# Patient Record
Sex: FEMALE | Race: WHITE | NOT HISPANIC OR LATINO | Employment: STUDENT | URBAN - METROPOLITAN AREA
[De-identification: names, ages, dates, MRNs, and addresses within clinical notes are randomized per-mention and may not be internally consistent; named-entity substitution may affect disease eponyms.]

---

## 2021-03-09 ENCOUNTER — OFFICE VISIT (OUTPATIENT)
Dept: OBSTETRICS AND GYNECOLOGY | Facility: CLINIC | Age: 21
End: 2021-03-09
Payer: MEDICAID

## 2021-03-09 VITALS
SYSTOLIC BLOOD PRESSURE: 130 MMHG | HEIGHT: 65 IN | WEIGHT: 256.38 LBS | BODY MASS INDEX: 42.71 KG/M2 | DIASTOLIC BLOOD PRESSURE: 76 MMHG

## 2021-03-09 DIAGNOSIS — N92.6 MENSES, IRREGULAR: ICD-10-CM

## 2021-03-09 DIAGNOSIS — Z01.419 WELL WOMAN EXAM WITH ROUTINE GYNECOLOGICAL EXAM: Primary | ICD-10-CM

## 2021-03-09 DIAGNOSIS — Z30.44 ENCOUNTER FOR SURVEILLANCE OF VAGINAL RING HORMONAL CONTRACEPTIVE DEVICE: ICD-10-CM

## 2021-03-09 PROCEDURE — 88175 CYTOPATH C/V AUTO FLUID REDO: CPT | Performed by: OBSTETRICS & GYNECOLOGY

## 2021-03-09 PROCEDURE — 99999 PR PBB SHADOW E&M-NEW PATIENT-LVL III: CPT | Mod: PBBFAC,,, | Performed by: OBSTETRICS & GYNECOLOGY

## 2021-03-09 PROCEDURE — 99385 PREV VISIT NEW AGE 18-39: CPT | Mod: S$PBB,,, | Performed by: OBSTETRICS & GYNECOLOGY

## 2021-03-09 PROCEDURE — 99999 PR PBB SHADOW E&M-NEW PATIENT-LVL III: ICD-10-PCS | Mod: PBBFAC,,, | Performed by: OBSTETRICS & GYNECOLOGY

## 2021-03-09 PROCEDURE — 99385 PR PREVENTIVE VISIT,NEW,18-39: ICD-10-PCS | Mod: S$PBB,,, | Performed by: OBSTETRICS & GYNECOLOGY

## 2021-03-09 PROCEDURE — 99203 OFFICE O/P NEW LOW 30 MIN: CPT | Mod: PBBFAC,PO | Performed by: OBSTETRICS & GYNECOLOGY

## 2021-03-09 RX ORDER — NORELGESTROMIN AND ETHINYL ESTRADIOL 150; 35 UG/D; UG/D
1 PATCH TRANSDERMAL
COMMUNITY
Start: 2021-02-22 | End: 2021-03-09

## 2021-03-09 RX ORDER — PROPRANOLOL HYDROCHLORIDE 80 MG/1
80 CAPSULE, EXTENDED RELEASE ORAL DAILY
COMMUNITY
Start: 2021-02-18 | End: 2024-01-10 | Stop reason: CLARIF

## 2021-03-09 RX ORDER — ETONOGESTREL AND ETHINYL ESTRADIOL VAGINAL RING .015; .12 MG/D; MG/D
1 RING VAGINAL
Qty: 3 EACH | Refills: 4 | Status: SHIPPED | OUTPATIENT
Start: 2021-03-09 | End: 2023-01-18

## 2021-03-09 RX ORDER — SUMATRIPTAN SUCCINATE 100 MG/1
100 TABLET ORAL DAILY PRN
COMMUNITY
Start: 2021-02-18 | End: 2023-01-18

## 2021-03-11 LAB
C TRACH RRNA SPEC QL NAA+PROBE: NEGATIVE
N GONORRHOEA RRNA SPEC QL NAA+PROBE: NEGATIVE

## 2021-03-17 LAB
CLINICAL INFO: NORMAL
CYTO CVX: NORMAL
CYTOLOGIST CVX/VAG CYTO: NORMAL
CYTOLOGY CMNT CVX/VAG CYTO-IMP: NORMAL
CYTOLOGY PAP THIN PREP EXPLANATION: NORMAL
DATE OF PREVIOUS PAP: NORMAL
DATE PREVIOUS BX: NO
LMP START DATE: NORMAL
SPECIMEN SOURCE CVX/VAG CYTO: NORMAL
STAT OF ADQ CVX/VAG CYTO-IMP: NORMAL

## 2021-04-16 ENCOUNTER — PATIENT MESSAGE (OUTPATIENT)
Dept: RESEARCH | Facility: HOSPITAL | Age: 21
End: 2021-04-16

## 2021-06-02 ENCOUNTER — TELEPHONE (OUTPATIENT)
Dept: OBSTETRICS AND GYNECOLOGY | Facility: CLINIC | Age: 21
End: 2021-06-02

## 2021-06-10 ENCOUNTER — OFFICE VISIT (OUTPATIENT)
Dept: OBSTETRICS AND GYNECOLOGY | Facility: CLINIC | Age: 21
End: 2021-06-10
Payer: MEDICAID

## 2021-06-10 VITALS
WEIGHT: 263.38 LBS | HEIGHT: 65 IN | DIASTOLIC BLOOD PRESSURE: 82 MMHG | SYSTOLIC BLOOD PRESSURE: 128 MMHG | BODY MASS INDEX: 43.88 KG/M2

## 2021-06-10 DIAGNOSIS — Z30.44 ENCOUNTER FOR SURVEILLANCE OF VAGINAL RING HORMONAL CONTRACEPTIVE DEVICE: ICD-10-CM

## 2021-06-10 DIAGNOSIS — N92.6 MENSES, IRREGULAR: Primary | ICD-10-CM

## 2021-06-10 DIAGNOSIS — R63.5 WEIGHT GAIN: ICD-10-CM

## 2021-06-10 PROCEDURE — 99999 PR PBB SHADOW E&M-EST. PATIENT-LVL III: ICD-10-PCS | Mod: PBBFAC,,, | Performed by: OBSTETRICS & GYNECOLOGY

## 2021-06-10 PROCEDURE — 99213 PR OFFICE/OUTPT VISIT, EST, LEVL III, 20-29 MIN: ICD-10-PCS | Mod: S$PBB,,, | Performed by: OBSTETRICS & GYNECOLOGY

## 2021-06-10 PROCEDURE — 99213 OFFICE O/P EST LOW 20 MIN: CPT | Mod: PBBFAC,PO | Performed by: OBSTETRICS & GYNECOLOGY

## 2021-06-10 PROCEDURE — 99999 PR PBB SHADOW E&M-EST. PATIENT-LVL III: CPT | Mod: PBBFAC,,, | Performed by: OBSTETRICS & GYNECOLOGY

## 2021-06-10 PROCEDURE — 99213 OFFICE O/P EST LOW 20 MIN: CPT | Mod: S$PBB,,, | Performed by: OBSTETRICS & GYNECOLOGY

## 2021-10-22 ENCOUNTER — OFFICE VISIT (OUTPATIENT)
Dept: URGENT CARE | Facility: CLINIC | Age: 21
End: 2021-10-22
Payer: MEDICAID

## 2021-10-22 VITALS
BODY MASS INDEX: 43.32 KG/M2 | HEART RATE: 85 BPM | RESPIRATION RATE: 18 BRPM | SYSTOLIC BLOOD PRESSURE: 122 MMHG | HEIGHT: 65 IN | TEMPERATURE: 97 F | OXYGEN SATURATION: 98 % | WEIGHT: 260 LBS | DIASTOLIC BLOOD PRESSURE: 79 MMHG

## 2021-10-22 DIAGNOSIS — M76.30 ILIOTIBIAL BAND SYNDROME, UNSPECIFIED LATERALITY: Primary | ICD-10-CM

## 2021-10-22 PROCEDURE — 99204 PR OFFICE/OUTPT VISIT, NEW, LEVL IV, 45-59 MIN: ICD-10-PCS | Mod: S$GLB,,, | Performed by: PHYSICIAN ASSISTANT

## 2021-10-22 PROCEDURE — 99204 OFFICE O/P NEW MOD 45 MIN: CPT | Mod: S$GLB,,, | Performed by: PHYSICIAN ASSISTANT

## 2021-10-22 RX ORDER — AMITRIPTYLINE HYDROCHLORIDE 10 MG/1
TABLET, FILM COATED ORAL
COMMUNITY
Start: 2021-05-21 | End: 2021-12-06

## 2021-10-22 RX ORDER — NAPROXEN 500 MG/1
500 TABLET ORAL 2 TIMES DAILY
Qty: 60 TABLET | Refills: 0 | Status: SHIPPED | OUTPATIENT
Start: 2021-10-22 | End: 2021-11-21

## 2021-10-22 RX ORDER — BUTALBITAL, ACETAMINOPHEN AND CAFFEINE 300; 40; 50 MG/1; MG/1; MG/1
1-2 CAPSULE ORAL EVERY 4 HOURS PRN
COMMUNITY
Start: 2021-04-27 | End: 2021-12-06

## 2021-11-26 ENCOUNTER — HOSPITAL ENCOUNTER (EMERGENCY)
Facility: HOSPITAL | Age: 21
Discharge: HOME | End: 2021-11-26
Attending: EMERGENCY MEDICINE
Payer: MEDICAID

## 2021-11-26 VITALS
OXYGEN SATURATION: 99 % | RESPIRATION RATE: 18 BRPM | BODY MASS INDEX: 43.27 KG/M2 | SYSTOLIC BLOOD PRESSURE: 122 MMHG | WEIGHT: 260 LBS | TEMPERATURE: 99 F | DIASTOLIC BLOOD PRESSURE: 72 MMHG | HEART RATE: 88 BPM

## 2021-11-26 DIAGNOSIS — M25.562 LEFT KNEE PAIN: ICD-10-CM

## 2021-11-26 LAB
B-HCG UR QL: NEGATIVE
CTP QC/QA: YES

## 2021-11-26 PROCEDURE — 96372 THER/PROPH/DIAG INJ SC/IM: CPT

## 2021-11-26 PROCEDURE — 81025 URINE PREGNANCY TEST: CPT | Performed by: PHYSICIAN ASSISTANT

## 2021-11-26 PROCEDURE — 99284 EMERGENCY DEPT VISIT MOD MDM: CPT | Mod: 25

## 2021-11-26 PROCEDURE — 63600175 PHARM REV CODE 636 W HCPCS: Performed by: PHYSICIAN ASSISTANT

## 2021-11-26 RX ORDER — KETOROLAC TROMETHAMINE 10 MG/1
10 TABLET, FILM COATED ORAL EVERY 6 HOURS
Qty: 20 TABLET | Refills: 0 | Status: SHIPPED | OUTPATIENT
Start: 2021-11-26 | End: 2021-12-01

## 2021-11-26 RX ORDER — KETOROLAC TROMETHAMINE 30 MG/ML
15 INJECTION, SOLUTION INTRAMUSCULAR; INTRAVENOUS
Status: COMPLETED | OUTPATIENT
Start: 2021-11-26 | End: 2021-11-26

## 2021-11-26 RX ADMIN — KETOROLAC TROMETHAMINE 15 MG: 30 INJECTION, SOLUTION INTRAMUSCULAR; INTRAVENOUS at 10:11

## 2021-12-06 ENCOUNTER — OFFICE VISIT (OUTPATIENT)
Dept: URGENT CARE | Facility: CLINIC | Age: 21
End: 2021-12-06
Payer: MEDICAID

## 2021-12-06 VITALS
WEIGHT: 260 LBS | HEART RATE: 80 BPM | SYSTOLIC BLOOD PRESSURE: 126 MMHG | RESPIRATION RATE: 16 BRPM | OXYGEN SATURATION: 99 % | DIASTOLIC BLOOD PRESSURE: 88 MMHG | HEIGHT: 65 IN | BODY MASS INDEX: 43.32 KG/M2 | TEMPERATURE: 99 F

## 2021-12-06 DIAGNOSIS — H60.331 ACUTE SWIMMER'S EAR OF RIGHT SIDE: Primary | ICD-10-CM

## 2021-12-06 PROBLEM — G43.909 MIGRAINE HEADACHE: Status: ACTIVE | Noted: 2021-12-06

## 2021-12-06 PROBLEM — F90.9 ATTENTION DEFICIT HYPERACTIVITY DISORDER (ADHD): Status: ACTIVE | Noted: 2021-12-06

## 2021-12-06 PROBLEM — F41.1 GENERALIZED ANXIETY DISORDER: Status: ACTIVE | Noted: 2021-12-06

## 2021-12-06 PROBLEM — R00.0 TACHYCARDIA: Status: ACTIVE | Noted: 2021-12-06

## 2021-12-06 PROCEDURE — 99213 OFFICE O/P EST LOW 20 MIN: CPT | Mod: S$GLB,,, | Performed by: PHYSICIAN ASSISTANT

## 2021-12-06 PROCEDURE — 99213 PR OFFICE/OUTPT VISIT, EST, LEVL III, 20-29 MIN: ICD-10-PCS | Mod: S$GLB,,, | Performed by: PHYSICIAN ASSISTANT

## 2021-12-06 RX ORDER — PREDNISONE 20 MG/1
20 TABLET ORAL DAILY
COMMUNITY
Start: 2021-09-24 | End: 2023-01-18

## 2021-12-06 RX ORDER — CETIRIZINE HYDROCHLORIDE 10 MG/1
10 TABLET ORAL DAILY
COMMUNITY
Start: 2021-09-15 | End: 2023-01-18

## 2021-12-06 RX ORDER — CEPHALEXIN 500 MG/1
CAPSULE ORAL
COMMUNITY
Start: 2021-12-02 | End: 2023-01-18

## 2021-12-06 RX ORDER — ACETAMINOPHEN AND CODEINE PHOSPHATE 120; 12 MG/5ML; MG/5ML
1 SOLUTION ORAL DAILY
COMMUNITY
Start: 2021-11-04 | End: 2023-01-18

## 2021-12-06 RX ORDER — FLUCONAZOLE 150 MG/1
TABLET ORAL
COMMUNITY
Start: 2021-09-24 | End: 2022-11-21

## 2021-12-06 RX ORDER — LIDOCAINE HYDROCHLORIDE 20 MG/ML
SOLUTION ORAL; TOPICAL
COMMUNITY
Start: 2021-09-02 | End: 2023-01-18

## 2021-12-06 RX ORDER — FLUTICASONE PROPIONATE 50 MCG
SPRAY, SUSPENSION (ML) NASAL
COMMUNITY
Start: 2021-09-15 | End: 2023-01-18

## 2021-12-06 RX ORDER — CIPROFLOXACIN AND DEXAMETHASONE 3; 1 MG/ML; MG/ML
4 SUSPENSION/ DROPS AURICULAR (OTIC) 2 TIMES DAILY
Qty: 7.5 ML | Refills: 0 | Status: SHIPPED | OUTPATIENT
Start: 2021-12-06 | End: 2021-12-13

## 2022-04-29 ENCOUNTER — OFFICE VISIT (OUTPATIENT)
Dept: OBSTETRICS AND GYNECOLOGY | Facility: CLINIC | Age: 22
End: 2022-04-29
Payer: MEDICAID

## 2022-04-29 ENCOUNTER — LAB VISIT (OUTPATIENT)
Dept: LAB | Facility: HOSPITAL | Age: 22
End: 2022-04-29
Attending: OBSTETRICS & GYNECOLOGY
Payer: MEDICAID

## 2022-04-29 VITALS
BODY MASS INDEX: 44.11 KG/M2 | SYSTOLIC BLOOD PRESSURE: 129 MMHG | WEIGHT: 264.75 LBS | HEIGHT: 65 IN | DIASTOLIC BLOOD PRESSURE: 85 MMHG

## 2022-04-29 DIAGNOSIS — N91.1 SECONDARY AMENORRHEA: ICD-10-CM

## 2022-04-29 DIAGNOSIS — Z12.4 CERVICAL CANCER SCREENING: ICD-10-CM

## 2022-04-29 DIAGNOSIS — N91.1 SECONDARY AMENORRHEA: Primary | ICD-10-CM

## 2022-04-29 LAB
HCG INTACT+B SERPL-ACNC: <1.2 MIU/ML
TSH SERPL DL<=0.005 MIU/L-ACNC: 1.01 UIU/ML (ref 0.4–4)

## 2022-04-29 PROCEDURE — 99213 PR OFFICE/OUTPT VISIT, EST, LEVL III, 20-29 MIN: ICD-10-PCS | Mod: S$PBB,,, | Performed by: OBSTETRICS & GYNECOLOGY

## 2022-04-29 PROCEDURE — 84443 ASSAY THYROID STIM HORMONE: CPT | Performed by: OBSTETRICS & GYNECOLOGY

## 2022-04-29 PROCEDURE — 88175 CYTOPATH C/V AUTO FLUID REDO: CPT | Performed by: OBSTETRICS & GYNECOLOGY

## 2022-04-29 PROCEDURE — 3074F SYST BP LT 130 MM HG: CPT | Mod: CPTII,,, | Performed by: OBSTETRICS & GYNECOLOGY

## 2022-04-29 PROCEDURE — 3008F BODY MASS INDEX DOCD: CPT | Mod: CPTII,,, | Performed by: OBSTETRICS & GYNECOLOGY

## 2022-04-29 PROCEDURE — 1159F MED LIST DOCD IN RCRD: CPT | Mod: CPTII,,, | Performed by: OBSTETRICS & GYNECOLOGY

## 2022-04-29 PROCEDURE — 3074F PR MOST RECENT SYSTOLIC BLOOD PRESSURE < 130 MM HG: ICD-10-PCS | Mod: CPTII,,, | Performed by: OBSTETRICS & GYNECOLOGY

## 2022-04-29 PROCEDURE — 99999 PR PBB SHADOW E&M-EST. PATIENT-LVL III: CPT | Mod: PBBFAC,,, | Performed by: OBSTETRICS & GYNECOLOGY

## 2022-04-29 PROCEDURE — 84702 CHORIONIC GONADOTROPIN TEST: CPT | Performed by: OBSTETRICS & GYNECOLOGY

## 2022-04-29 PROCEDURE — 3079F DIAST BP 80-89 MM HG: CPT | Mod: CPTII,,, | Performed by: OBSTETRICS & GYNECOLOGY

## 2022-04-29 PROCEDURE — 3008F PR BODY MASS INDEX (BMI) DOCUMENTED: ICD-10-PCS | Mod: CPTII,,, | Performed by: OBSTETRICS & GYNECOLOGY

## 2022-04-29 PROCEDURE — 3079F PR MOST RECENT DIASTOLIC BLOOD PRESSURE 80-89 MM HG: ICD-10-PCS | Mod: CPTII,,, | Performed by: OBSTETRICS & GYNECOLOGY

## 2022-04-29 PROCEDURE — 99213 OFFICE O/P EST LOW 20 MIN: CPT | Mod: PBBFAC,PO | Performed by: OBSTETRICS & GYNECOLOGY

## 2022-04-29 PROCEDURE — 99999 PR PBB SHADOW E&M-EST. PATIENT-LVL III: ICD-10-PCS | Mod: PBBFAC,,, | Performed by: OBSTETRICS & GYNECOLOGY

## 2022-04-29 PROCEDURE — 36415 COLL VENOUS BLD VENIPUNCTURE: CPT | Performed by: OBSTETRICS & GYNECOLOGY

## 2022-04-29 PROCEDURE — 83001 ASSAY OF GONADOTROPIN (FSH): CPT | Performed by: OBSTETRICS & GYNECOLOGY

## 2022-04-29 PROCEDURE — 84146 ASSAY OF PROLACTIN: CPT | Performed by: OBSTETRICS & GYNECOLOGY

## 2022-04-29 PROCEDURE — 1159F PR MEDICATION LIST DOCUMENTED IN MEDICAL RECORD: ICD-10-PCS | Mod: CPTII,,, | Performed by: OBSTETRICS & GYNECOLOGY

## 2022-04-29 PROCEDURE — 99213 OFFICE O/P EST LOW 20 MIN: CPT | Mod: S$PBB,,, | Performed by: OBSTETRICS & GYNECOLOGY

## 2022-04-29 NOTE — PROGRESS NOTES
"20 yo female who presents to discuss her irregular menstrual cycles.  She reports menarche at 10 yrs old.  Cycles have always been irregular.  Was placed on OCPs at 13 yrs old.  Could not remember to take OCPs.    Was put on ortho evra patch. This cause skin irritation.  Used nuvaring - and had irregular bleeding.    Reports cycle in oct 2021. Last cycle then followed around 2/27/2022.  No cycle since that time.  Is sexually active.    Wants to discuss other ways to regulate her cycle.  Reports h/o lots of fascial hair.    ROS:  GENERAL: Denies weight gain or weight loss. Feeling well overall.   SKIN: Denies rash or lesions.   HEAD: Denies head injury or headache.   CHEST: Denies chest pain or shortness of breath.   CARDIOVASCULAR: Denies palpitations or left sided chest pain.   ABDOMEN: No abdominal pain, constipation, diarrhea, nausea, vomiting or rectal bleeding.   URINARY: No frequency, dysuria, hematuria, or burning on urination.  REPRODUCTIVE: See HPI.   BREASTS: denies pain, lumps, or nipple discharge.   HEMATOLOGIC: No easy bruisability or excessive bleeding.   MUSCULOSKELETAL: Denies joint pain or swelling.   NEUROLOGIC: Denies syncope or weakness.   PSYCHIATRIC: Denies depression, anxiety or mood swings.       PE:   Vitals: /85   Ht 5' 5" (1.651 m)   Wt 120.1 kg (264 lb 12.4 oz)   LMP 02/27/2022   BMI 44.06 kg/m²   APPEARANCE: Well nourished, well developed, in no acute distress.  ABDOMEN: Soft. No tenderness or masses. No hepatosplenomegaly. No hernias. Obese.  PELVIC: Normal external female genitalia without lesions. Normal hair distribution. Adequate perineal body, normal urethral meatus. Vagina moist and well rugated without lesions or discharge. Cervix pink and without lesions. No significant cystocele or rectocele. Bimanual exam showed uterus normal size, shape, position, mobile and nontender. Adnexa without masses or tenderness. Urethra and bladder normal.      AP:   Secondary " amenorrhea  Prolactin, tsh, serum bhcg, fsh ordered  Pelvic US to check for PCOS  Wants to discuss options for (possible cyclic provera) over the phone AFTER U/S is available AND if labs are normal    Pap collected    fernanda jacobsen MD

## 2022-04-30 LAB
FSH SERPL-ACNC: 4.95 MIU/ML
PROLACTIN SERPL IA-MCNC: 13.3 NG/ML (ref 5.2–26.5)

## 2022-05-04 ENCOUNTER — HOSPITAL ENCOUNTER (OUTPATIENT)
Dept: RADIOLOGY | Facility: HOSPITAL | Age: 22
Discharge: HOME | End: 2022-05-04
Attending: OBSTETRICS & GYNECOLOGY
Payer: MEDICAID

## 2022-05-04 DIAGNOSIS — N91.1 SECONDARY AMENORRHEA: ICD-10-CM

## 2022-05-04 PROCEDURE — 76856 US PELVIS COMP WITH TRANSVAG NON-OB (XPD): ICD-10-PCS | Mod: 26,,, | Performed by: RADIOLOGY

## 2022-05-04 PROCEDURE — 76830 TRANSVAGINAL US NON-OB: CPT | Mod: TC

## 2022-05-04 PROCEDURE — 76856 US EXAM PELVIC COMPLETE: CPT | Mod: 26,,, | Performed by: RADIOLOGY

## 2022-05-04 PROCEDURE — 76830 TRANSVAGINAL US NON-OB: CPT | Mod: 26,,, | Performed by: RADIOLOGY

## 2022-05-04 PROCEDURE — 76830 US PELVIS COMP WITH TRANSVAG NON-OB (XPD): ICD-10-PCS | Mod: 26,,, | Performed by: RADIOLOGY

## 2022-05-09 ENCOUNTER — TELEPHONE (OUTPATIENT)
Dept: OBSTETRICS AND GYNECOLOGY | Facility: CLINIC | Age: 22
End: 2022-05-09
Payer: MEDICAID

## 2022-05-09 ENCOUNTER — PATIENT MESSAGE (OUTPATIENT)
Dept: OBSTETRICS AND GYNECOLOGY | Facility: CLINIC | Age: 22
End: 2022-05-09
Payer: MEDICAID

## 2022-05-09 NOTE — TELEPHONE ENCOUNTER
I spoke to the patient. Discussed pelvic US results which shows right ovarian cyst with possible dermoid.    The patient has h/o irregular cycles. Was concerned about PCOS. However, her lining is thin at 4.4mm (no cycle since 2/2022). Informed her that provera will not help give her a cycle.    All blood work is also normal: TSH, prolactin, FSH.    Patient will contemplate this information and contact me when she is ready to discuss info again.    Dr jacobsen

## 2022-05-10 NOTE — PROGRESS NOTES
pap is normal    Your pelvic exam will still need to occur once a year!    See you then!    Dr jacobsen

## 2022-08-16 ENCOUNTER — HOSPITAL ENCOUNTER (EMERGENCY)
Facility: HOSPITAL | Age: 22
Discharge: HOME | End: 2022-08-16
Attending: EMERGENCY MEDICINE
Payer: MEDICAID

## 2022-08-16 VITALS
SYSTOLIC BLOOD PRESSURE: 135 MMHG | HEART RATE: 92 BPM | BODY MASS INDEX: 43.99 KG/M2 | RESPIRATION RATE: 18 BRPM | TEMPERATURE: 98 F | DIASTOLIC BLOOD PRESSURE: 92 MMHG | OXYGEN SATURATION: 97 % | HEIGHT: 65 IN | WEIGHT: 264 LBS

## 2022-08-16 DIAGNOSIS — M54.32 LEFT SIDED SCIATICA: Primary | ICD-10-CM

## 2022-08-16 PROCEDURE — 96372 THER/PROPH/DIAG INJ SC/IM: CPT | Performed by: EMERGENCY MEDICINE

## 2022-08-16 PROCEDURE — 99284 EMERGENCY DEPT VISIT MOD MDM: CPT

## 2022-08-16 PROCEDURE — 63600175 PHARM REV CODE 636 W HCPCS: Performed by: EMERGENCY MEDICINE

## 2022-08-16 RX ORDER — KETOROLAC TROMETHAMINE 30 MG/ML
15 INJECTION, SOLUTION INTRAMUSCULAR; INTRAVENOUS
Status: COMPLETED | OUTPATIENT
Start: 2022-08-16 | End: 2022-08-16

## 2022-08-16 RX ORDER — CYCLOBENZAPRINE HCL 10 MG
10 TABLET ORAL 3 TIMES DAILY PRN
Qty: 15 TABLET | Refills: 0 | Status: SHIPPED | OUTPATIENT
Start: 2022-08-16 | End: 2022-08-21

## 2022-08-16 RX ADMIN — KETOROLAC TROMETHAMINE 15 MG: 30 INJECTION, SOLUTION INTRAMUSCULAR at 09:08

## 2022-08-16 NOTE — Clinical Note
"Dexter Castillois" Alonso was seen and treated in our emergency department on 8/16/2022.  She may return to work on 08/17/2022.       If you have any questions or concerns, please don't hesitate to call.      KAROLINE Newman    "

## 2022-08-16 NOTE — ED NOTES
Patient reports she is beginning to feel some slight relief to her left sided pain after receiving IM Toradol.

## 2022-08-16 NOTE — ED PROVIDER NOTES
Encounter Date: 8/16/2022       History     Chief Complaint   Patient presents with    Sciatica     Pt presents to ED today reports hx of sciatica to left leg unrelieved by ibuprofen. Pt reports she fell out of bed today when getting up due to pain. Denies sustaining other injuries. Pt ambulatory to triage      21-year-old female presenting with worsening left-sided sciatic pain today.  Patient says she has been having her current episode of sciatica on her left side for the past month but today had pain when getting out of bed and had a fall landing on her left side.  Patient denies injuring any new areas but has continued pain in her left low back that radiates down her left leg.  She has no incontinence.  No saddle paresthesias.  No leg weakness.        Review of patient's allergies indicates:  No Known Allergies  No past medical history on file.  No past surgical history on file.  Family History   Problem Relation Age of Onset    No Known Problems Mother     No Known Problems Father      Social History     Tobacco Use    Smoking status: Never Smoker    Smokeless tobacco: Never Used   Substance Use Topics    Alcohol use: Yes     Comment: rare    Drug use: Never     Review of Systems   Constitutional: Negative for fever.   Respiratory: Negative for shortness of breath.    Cardiovascular: Negative for chest pain.   Gastrointestinal: Negative for vomiting.   Musculoskeletal: Positive for back pain. Negative for neck pain.   Skin: Negative for rash.   Neurological: Negative for headaches.       Physical Exam     Initial Vitals [08/16/22 0754]   BP Pulse Resp Temp SpO2   (!) 140/98 99 18 98 °F (36.7 °C) 98 %      MAP       --         Physical Exam    Nursing note and vitals reviewed.  HENT:   Head: Atraumatic.   Eyes: EOM are normal.   Neck:   Normal range of motion.  Cardiovascular: Exam reveals no gallop and no friction rub.    No murmur heard.  Pulmonary/Chest: Breath sounds normal. No respiratory distress.  She has no wheezes. She has no rhonchi. She has no rales.   Abdominal: Abdomen is soft. Bowel sounds are normal. She exhibits no distension.   Musculoskeletal:         General: Normal range of motion.      Cervical back: Normal range of motion.      Comments: Mild tenderness to palpation to the left low back     Neurological: She is alert and oriented to person, place, and time. She has normal strength. No cranial nerve deficit or sensory deficit.   Psychiatric: She has a normal mood and affect.         ED Course   Procedures  Labs Reviewed - No data to display       Imaging Results          X-Ray Hip 2 or 3 views Left (with Pelvis when performed) (Final result)  Result time 08/16/22 10:30:40    Final result by Dirk Daniels MD (08/16/22 10:30:40)                 Impression:      1. No acute displaced fracture or dislocation of the left hip.      Electronically signed by: Dirk Daniels MD  Date:    08/16/2022  Time:    10:30             Narrative:    EXAMINATION:  XR HIP WITH PELVIS WHEN PERFORMED, 2 OR 3 VIEWS LEFT    CLINICAL HISTORY:  fall;    TECHNIQUE:  AP view of the pelvis and frog leg lateral view of the left hip were performed.    COMPARISON:  None    FINDINGS:  Three views left hip.    The bilateral sacroiliac joints are intact.  The pubic symphysis is intact.  The bilateral femoroacetabular joints are intact.                                 Medications   ketorolac injection 15 mg (15 mg Intramuscular Given 8/16/22 0923)     Medical Decision Making:   Initial Assessment:   21-year-old female with history of chronic back pain presents with acutely worsening pain today.  Had a fall out of bed today.  Is ambulatory.  Vital signs unremarkable.  Mild tenderness to the left low back.  No midline lumbar tenderness.  Peripheral pulses intact.  No concerning signs or symptoms of cauda equina syndrome.  X-ray shows no acute fractures.  Plan for symptomatic treatment and ortho follow-up.                       Clinical Impression:   Final diagnoses:  [M54.32] Left sided sciatica (Primary)          ED Disposition Condition    Discharge Stable        ED Prescriptions     Medication Sig Dispense Start Date End Date Auth. Provider    cyclobenzaprine (FLEXERIL) 10 MG tablet Take 1 tablet (10 mg total) by mouth 3 (three) times daily as needed for Muscle spasms. 15 tablet 8/16/2022 8/21/2022 Gee Llamas MD        Follow-up Information     Follow up With Specialties Details Why Contact Info    Primary care  Schedule an appointment as soon as possible for a visit              Gee Llamas MD  08/16/22 6623

## 2022-08-16 NOTE — ED NOTES
Discharge instructions and prescriptions reviewed with patient. Patient verbalizes understanding. VSS.

## 2022-08-16 NOTE — ED NOTES
Patient presents to ED from home with c/o left sided sciatica and knee pain. Patient states she has a hx of a torn left meniscus. Patient states she fell out of bed this morning but denies

## 2022-11-02 ENCOUNTER — TELEPHONE (OUTPATIENT)
Dept: NEUROSURGERY | Facility: CLINIC | Age: 22
End: 2022-11-02
Payer: MEDICAID

## 2022-11-02 NOTE — TELEPHONE ENCOUNTER
Spoke w pt regarding NP appt she had scheduled for 11/15. Pt informed me she has recent outside imaging of her herniated disc and would like to be seen by the neurosurgeon. Informed pt can get her scheduled but will have to reschedule her appt to an adult clinic day since she is 21. Rescheduled appt for 12/12 at 3;30p. Pt confirmed will bring outside imaging to appt

## 2022-11-03 ENCOUNTER — TELEPHONE (OUTPATIENT)
Dept: NEUROSURGERY | Facility: CLINIC | Age: 22
End: 2022-11-03
Payer: MEDICAID

## 2022-11-03 NOTE — TELEPHONE ENCOUNTER
Spoke w pt mother regarding pt being rescheduled due to incorrect scheduling. Pt mother requested a sooner appt to which I responded 12/12 is the soonest appt available. Informed mom that they can try to call [facility] and see if they have any sooner appts. Mother responded that have seen a [facility] doctor in the past and did not have a good experience. Mom informed me will bring outside records from outside pain clinic as well as CD with imaging to appt on 12/12.

## 2022-11-20 ENCOUNTER — PATIENT MESSAGE (OUTPATIENT)
Dept: OBSTETRICS AND GYNECOLOGY | Facility: CLINIC | Age: 22
End: 2022-11-20
Payer: MEDICAID

## 2022-11-21 ENCOUNTER — PATIENT MESSAGE (OUTPATIENT)
Dept: OBSTETRICS AND GYNECOLOGY | Facility: CLINIC | Age: 22
End: 2022-11-21
Payer: MEDICAID

## 2022-11-21 RX ORDER — FLUCONAZOLE 150 MG/1
TABLET ORAL
Qty: 2 TABLET | Refills: 0 | Status: SHIPPED | OUTPATIENT
Start: 2022-11-21 | End: 2023-01-23

## 2022-11-25 ENCOUNTER — OFFICE VISIT (OUTPATIENT)
Dept: OBSTETRICS AND GYNECOLOGY | Facility: CLINIC | Age: 22
End: 2022-11-25
Payer: MEDICAID

## 2022-11-25 VITALS
BODY MASS INDEX: 41.32 KG/M2 | SYSTOLIC BLOOD PRESSURE: 132 MMHG | HEIGHT: 65 IN | WEIGHT: 248 LBS | DIASTOLIC BLOOD PRESSURE: 82 MMHG

## 2022-11-25 DIAGNOSIS — N92.6 IRREGULAR MENSTRUAL CYCLE: Primary | ICD-10-CM

## 2022-11-25 PROCEDURE — 3008F BODY MASS INDEX DOCD: CPT | Mod: CPTII,,, | Performed by: OBSTETRICS & GYNECOLOGY

## 2022-11-25 PROCEDURE — 3079F PR MOST RECENT DIASTOLIC BLOOD PRESSURE 80-89 MM HG: ICD-10-PCS | Mod: CPTII,,, | Performed by: OBSTETRICS & GYNECOLOGY

## 2022-11-25 PROCEDURE — 3075F PR MOST RECENT SYSTOLIC BLOOD PRESS GE 130-139MM HG: ICD-10-PCS | Mod: CPTII,,, | Performed by: OBSTETRICS & GYNECOLOGY

## 2022-11-25 PROCEDURE — 3075F SYST BP GE 130 - 139MM HG: CPT | Mod: CPTII,,, | Performed by: OBSTETRICS & GYNECOLOGY

## 2022-11-25 PROCEDURE — 99999 PR PBB SHADOW E&M-EST. PATIENT-LVL III: CPT | Mod: PBBFAC,,, | Performed by: OBSTETRICS & GYNECOLOGY

## 2022-11-25 PROCEDURE — 1159F MED LIST DOCD IN RCRD: CPT | Mod: CPTII,,, | Performed by: OBSTETRICS & GYNECOLOGY

## 2022-11-25 PROCEDURE — 99212 PR OFFICE/OUTPT VISIT, EST, LEVL II, 10-19 MIN: ICD-10-PCS | Mod: S$PBB,,, | Performed by: OBSTETRICS & GYNECOLOGY

## 2022-11-25 PROCEDURE — 99213 OFFICE O/P EST LOW 20 MIN: CPT | Mod: PBBFAC,PO | Performed by: OBSTETRICS & GYNECOLOGY

## 2022-11-25 PROCEDURE — 3008F PR BODY MASS INDEX (BMI) DOCUMENTED: ICD-10-PCS | Mod: CPTII,,, | Performed by: OBSTETRICS & GYNECOLOGY

## 2022-11-25 PROCEDURE — 99999 PR PBB SHADOW E&M-EST. PATIENT-LVL III: ICD-10-PCS | Mod: PBBFAC,,, | Performed by: OBSTETRICS & GYNECOLOGY

## 2022-11-25 PROCEDURE — 99212 OFFICE O/P EST SF 10 MIN: CPT | Mod: S$PBB,,, | Performed by: OBSTETRICS & GYNECOLOGY

## 2022-11-25 PROCEDURE — 1159F PR MEDICATION LIST DOCUMENTED IN MEDICAL RECORD: ICD-10-PCS | Mod: CPTII,,, | Performed by: OBSTETRICS & GYNECOLOGY

## 2022-11-25 PROCEDURE — 3079F DIAST BP 80-89 MM HG: CPT | Mod: CPTII,,, | Performed by: OBSTETRICS & GYNECOLOGY

## 2022-11-25 RX ORDER — GABAPENTIN 300 MG/1
300 CAPSULE ORAL 3 TIMES DAILY
COMMUNITY
Start: 2022-10-10 | End: 2023-01-23 | Stop reason: DRUGHIGH

## 2022-11-25 RX ORDER — MEDROXYPROGESTERONE ACETATE 10 MG/1
10 TABLET ORAL DAILY
Qty: 30 TABLET | Refills: 12 | Status: SHIPPED | OUTPATIENT
Start: 2022-11-25 | End: 2023-05-29 | Stop reason: SDUPTHER

## 2022-11-26 NOTE — PROGRESS NOTES
23 yo female who presents to discuss her irregular menstrual cycles.  She has tried various types of contraception without improvement of cycle.  Ok with trying cyclic provera.  Rx sent.    S MD delmar

## 2022-12-12 ENCOUNTER — HOSPITAL ENCOUNTER (OUTPATIENT)
Dept: RADIOLOGY | Facility: HOSPITAL | Age: 22
Discharge: HOME | End: 2022-12-12
Attending: STUDENT IN AN ORGANIZED HEALTH CARE EDUCATION/TRAINING PROGRAM
Payer: MEDICAID

## 2022-12-12 ENCOUNTER — OFFICE VISIT (OUTPATIENT)
Dept: NEUROSURGERY | Facility: CLINIC | Age: 22
End: 2022-12-12
Payer: MEDICAID

## 2022-12-12 VITALS — DIASTOLIC BLOOD PRESSURE: 86 MMHG | HEART RATE: 112 BPM | TEMPERATURE: 99 F | SYSTOLIC BLOOD PRESSURE: 132 MMHG

## 2022-12-12 DIAGNOSIS — M54.16 LUMBAR RADICULOPATHY, CHRONIC: ICD-10-CM

## 2022-12-12 DIAGNOSIS — M54.16 LUMBAR RADICULOPATHY, CHRONIC: Primary | ICD-10-CM

## 2022-12-12 PROCEDURE — 72114 X-RAY EXAM L-S SPINE BENDING: CPT | Mod: 26,,, | Performed by: RADIOLOGY

## 2022-12-12 PROCEDURE — 99213 OFFICE O/P EST LOW 20 MIN: CPT | Mod: PBBFAC | Performed by: STUDENT IN AN ORGANIZED HEALTH CARE EDUCATION/TRAINING PROGRAM

## 2022-12-12 PROCEDURE — 1159F PR MEDICATION LIST DOCUMENTED IN MEDICAL RECORD: ICD-10-PCS | Mod: CPTII,,, | Performed by: STUDENT IN AN ORGANIZED HEALTH CARE EDUCATION/TRAINING PROGRAM

## 2022-12-12 PROCEDURE — 1159F MED LIST DOCD IN RCRD: CPT | Mod: CPTII,,, | Performed by: STUDENT IN AN ORGANIZED HEALTH CARE EDUCATION/TRAINING PROGRAM

## 2022-12-12 PROCEDURE — 99203 OFFICE O/P NEW LOW 30 MIN: CPT | Mod: S$PBB,,, | Performed by: STUDENT IN AN ORGANIZED HEALTH CARE EDUCATION/TRAINING PROGRAM

## 2022-12-12 PROCEDURE — 99999 PR PBB SHADOW E&M-EST. PATIENT-LVL III: ICD-10-PCS | Mod: PBBFAC,,, | Performed by: STUDENT IN AN ORGANIZED HEALTH CARE EDUCATION/TRAINING PROGRAM

## 2022-12-12 PROCEDURE — 3079F PR MOST RECENT DIASTOLIC BLOOD PRESSURE 80-89 MM HG: ICD-10-PCS | Mod: CPTII,,, | Performed by: STUDENT IN AN ORGANIZED HEALTH CARE EDUCATION/TRAINING PROGRAM

## 2022-12-12 PROCEDURE — 72114 X-RAY EXAM L-S SPINE BENDING: CPT | Mod: TC

## 2022-12-12 PROCEDURE — 72114 XR LUMBAR SPINE 5 VIEW WITH FLEX AND EXT: ICD-10-PCS | Mod: 26,,, | Performed by: RADIOLOGY

## 2022-12-12 PROCEDURE — 3075F PR MOST RECENT SYSTOLIC BLOOD PRESS GE 130-139MM HG: ICD-10-PCS | Mod: CPTII,,, | Performed by: STUDENT IN AN ORGANIZED HEALTH CARE EDUCATION/TRAINING PROGRAM

## 2022-12-12 PROCEDURE — 1160F RVW MEDS BY RX/DR IN RCRD: CPT | Mod: CPTII,,, | Performed by: STUDENT IN AN ORGANIZED HEALTH CARE EDUCATION/TRAINING PROGRAM

## 2022-12-12 PROCEDURE — 1160F PR REVIEW ALL MEDS BY PRESCRIBER/CLIN PHARMACIST DOCUMENTED: ICD-10-PCS | Mod: CPTII,,, | Performed by: STUDENT IN AN ORGANIZED HEALTH CARE EDUCATION/TRAINING PROGRAM

## 2022-12-12 PROCEDURE — 3075F SYST BP GE 130 - 139MM HG: CPT | Mod: CPTII,,, | Performed by: STUDENT IN AN ORGANIZED HEALTH CARE EDUCATION/TRAINING PROGRAM

## 2022-12-12 PROCEDURE — 3079F DIAST BP 80-89 MM HG: CPT | Mod: CPTII,,, | Performed by: STUDENT IN AN ORGANIZED HEALTH CARE EDUCATION/TRAINING PROGRAM

## 2022-12-12 PROCEDURE — 99203 PR OFFICE/OUTPT VISIT, NEW, LEVL III, 30-44 MIN: ICD-10-PCS | Mod: S$PBB,,, | Performed by: STUDENT IN AN ORGANIZED HEALTH CARE EDUCATION/TRAINING PROGRAM

## 2022-12-12 PROCEDURE — 99999 PR PBB SHADOW E&M-EST. PATIENT-LVL III: CPT | Mod: PBBFAC,,, | Performed by: STUDENT IN AN ORGANIZED HEALTH CARE EDUCATION/TRAINING PROGRAM

## 2022-12-12 RX ORDER — NAPROXEN 250 MG/1
250 TABLET ORAL 2 TIMES DAILY
COMMUNITY
Start: 2022-11-28 | End: 2023-04-04

## 2022-12-12 NOTE — PROGRESS NOTES
Neurosurgery  History & Physical    SUBJECTIVE:     Chief Complaint: leg and back pain    History of Present Illness:  Dexter Gupta is a 23 yo year old female who presents for evaluation of lower back and left leg pain.  Symptoms started August 25, 2021 and recurred this past Fall. The pain is described as aching in her left hip and shooting and burning in the left leg, feels like leg is on fire.  Pain is in the lower back and radiates to the left leg to top of the left foot.  Pain is rated as 10/10 at worst and currently is 9/10.  Left lateral calf and foot numbness/tingling, exacerbated by walking.  Reports gait instability and fell in September.  Difficulty urinating with incomplete bladder emptying and new constipation for the last 1-2 months. Pain is exacerbated by walking and sitting for prolonged sitting without relieving factors. She walks leaning to the left due to her pain and is unable to tolerate standing straight    The patient has tried: SADI- 10/24/22 with severe pain episode 2 days later and was unable to walk for 2 weeks, PT-tried but unable to tolerate, NSAIDs-naproxen.  Currently taking gabapentin- tried Lyrica but switched back to gabapentin due to side effects (depression).  Has also lost approximately 20 lbs without significant benefit or change.    Notable comorbidities include: obesity  Any blood thinners? no  Tobacco use? no    History obtained from patient and spine services intake form which will be uploaded in Epic.    Review of patient's allergies indicates:  No Known Allergies    Current Outpatient Medications   Medication Sig Dispense Refill    gabapentin (NEURONTIN) 300 MG capsule Take 300 mg by mouth 3 (three) times daily.      medroxyPROGESTERone (PROVERA) 10 MG tablet Take 1 tablet (10 mg total) by mouth once daily. 30 tablet 12    naproxen (NAPROSYN) 250 MG tablet Take 250 mg by mouth 2 (two) times daily.      propranoloL (INDERAL LA) 80 MG 24 hr capsule Take 80 mg by mouth once  daily.      cephALEXin (KEFLEX) 500 MG capsule       cetirizine (ZYRTEC) 10 MG tablet Take 10 mg by mouth once daily.      etonogestreL-ethinyl estradioL (NUVARING) 0.12-0.015 mg/24 hr vaginal ring Place 1 each vaginally every 21 days. for 21 days 3 each 4    fluconazole (DIFLUCAN) 150 MG Tab Take one pill and if symptoms persist after 72 hours, take the second pill. (Patient not taking: Reported on 2022) 2 tablet 0    fluticasone propionate (FLONASE) 50 mcg/actuation nasal spray SMARTSI-2 Spray(s) Both Nares Daily PRN      LIDOCAINE VISCOUS 2 % solution SMARTSI Milliliter(s) By Mouth 5 Times Daily      norethindrone (MICRONOR) 0.35 mg tablet Take 1 tablet by mouth once daily.      predniSONE (DELTASONE) 20 MG tablet Take 20 mg by mouth once daily.      sumatriptan (IMITREX) 100 MG tablet Take 100 mg by mouth daily as needed.       No current facility-administered medications for this visit.       History reviewed. No pertinent past medical history.  History reviewed. No pertinent surgical history.  Family History       Problem Relation (Age of Onset)    No Known Problems Mother, Father          Social History     Socioeconomic History    Marital status: Single   Tobacco Use    Smoking status: Never    Smokeless tobacco: Never   Substance and Sexual Activity    Alcohol use: Yes     Comment: rare    Drug use: Never    Sexual activity: Yes     Partners: Male     Birth control/protection: Patch, Condom       Review of Systems    OBJECTIVE:     Vital Signs  Temp: 98.6 °F (37 °C)  Pulse: (!) 112  BP: 132/86  Pain Score:   9  There is no height or weight on file to calculate BMI.      Neurosurgery Physical Exam  General: well developed, well nourished, no distress.   Pulmonary: no signs of respiratory distress, comfortable appearing on room air  Skin: Skin is warm, dry and intact.  Extremities: no edema, intact distal pulses    Neuro:  Mental Status: Alert and oriented. Oriented x 4  Language/language: No  aphasia. No dysarthria.   Cranial nerves: PERRL, EOMI, face symmetric   Sensory: intact to light touch throughout  Motor Strength:  Strength  Deltoids Triceps Biceps Wrist Extension Wrist Flexion Hand    Upper: R 5/5 5/5 5/5 5/5 5/5 5/5    L 5/5 5/5 5/5 5/5 5/5 5/5     Iliopsoas Quadriceps Knee  Flexion Tibialis  anterior Gastro- cnemius EHL   Lower: R 5/5 5/5 5/5 5/5 5/5 5/5    L 5/5 5/5 5/5 4/5 5/5 4/5   Gait stable    Diagnostic Results:  Outside MRI lumbar spine report was reviewed      Imaging Results - MRI Lumbar Spine without Contrast (10/03/2022 5:33 PM CDT)  Procedure Note   Gee Duarte MD - 10/03/2022    Formatting of this note might be different from the original.  MRI OF THE LUMBAR SPINE WITHOUT CONTRAST; performed on 10/3/2022 5:03 PM CDT    CLINICAL HISTORY: Straining injury 1 year ago, lbp down left side, radiculopathy x 1 year, recent injury july 2022    COMPARISON: None    TECHNIQUE:  Routine MRI of the lumbar spine was obtained without the aid of IV contrast. Total image count is 256.     FINDINGS:    Normal anatomic alignment is noted.  The bone marrow signal is normal.  The vertebral body heights are maintained.  The intervertebral discs maintain their height and signal at all levels.    The conus has a normal appearance and terminates at the level of L1.    T12/L1 - No significant canal or neuroforaminal compromise.  L1/2 - No significant canal or neuroforaminal compromise.  L2/3 - No significant canal or neuroforaminal compromise.  L3/4 - No significant canal or neuroforaminal compromise.  L4/5 - There is a large posterior disc protrusion compressing the thecal sac measuring 17.9 x 6.2 mm. A inferior extrusion component is also noted measuring approximately 8.9 mm below the level of the L5 superior endplate. The neural foramen are patent bilaterally.  L5/S1 - No significant canal or neuroforaminal compromise.    The paraspinous soft tissues appear normal.    IMPRESSION:      Large disc protrusion/extrusion at the level of L4-5 with severe central canal narrowing    Electronically Signed By: Gee Duarte MD 10/3/2022 11:26 PM CDT       ASSESSMENT/PLAN:     23 yo with lower back pain and radicular left leg pain since October 2022 with L4-5 disc herniation noted on outside imaging.  Symptoms have not improved with conservative management.  Will need to have imaging uploaded and will obtain XR flex/ext and CT lumbar spine.  - f.u 6 weeks        Note dictated with voice recognition software, please excuse any grammatical errors.

## 2022-12-23 ENCOUNTER — PATIENT MESSAGE (OUTPATIENT)
Dept: NEUROSURGERY | Facility: CLINIC | Age: 22
End: 2022-12-23
Payer: MEDICAID

## 2022-12-29 ENCOUNTER — TELEPHONE (OUTPATIENT)
Dept: NEUROSURGERY | Facility: CLINIC | Age: 22
End: 2022-12-29
Payer: MEDICAID

## 2022-12-29 NOTE — TELEPHONE ENCOUNTER
Spoke w pt mom and rescheduled appt to be sooner for 1/23. Mom confirmed time and date work with their schedule

## 2023-01-23 ENCOUNTER — OFFICE VISIT (OUTPATIENT)
Dept: NEUROSURGERY | Facility: CLINIC | Age: 23
End: 2023-01-23
Payer: MEDICAID

## 2023-01-23 ENCOUNTER — HOSPITAL ENCOUNTER (OUTPATIENT)
Dept: RADIOLOGY | Facility: HOSPITAL | Age: 23
Discharge: HOME | End: 2023-01-23
Attending: STUDENT IN AN ORGANIZED HEALTH CARE EDUCATION/TRAINING PROGRAM
Payer: MEDICAID

## 2023-01-23 DIAGNOSIS — M48.061 SPINAL STENOSIS OF LUMBAR REGION WITHOUT NEUROGENIC CLAUDICATION: ICD-10-CM

## 2023-01-23 DIAGNOSIS — M54.16 LUMBAR RADICULOPATHY, CHRONIC: ICD-10-CM

## 2023-01-23 DIAGNOSIS — M51.26 LUMBAR DISC HERNIATION: Primary | ICD-10-CM

## 2023-01-23 PROCEDURE — 72131 CT LUMBAR SPINE W/O DYE: CPT | Mod: 26,,, | Performed by: RADIOLOGY

## 2023-01-23 PROCEDURE — 1159F MED LIST DOCD IN RCRD: CPT | Mod: CPTII,,, | Performed by: STUDENT IN AN ORGANIZED HEALTH CARE EDUCATION/TRAINING PROGRAM

## 2023-01-23 PROCEDURE — 99999 PR PBB SHADOW E&M-EST. PATIENT-LVL II: CPT | Mod: PBBFAC,,, | Performed by: STUDENT IN AN ORGANIZED HEALTH CARE EDUCATION/TRAINING PROGRAM

## 2023-01-23 PROCEDURE — 99213 OFFICE O/P EST LOW 20 MIN: CPT | Mod: S$PBB,,, | Performed by: STUDENT IN AN ORGANIZED HEALTH CARE EDUCATION/TRAINING PROGRAM

## 2023-01-23 PROCEDURE — 72131 CT LUMBAR SPINE W/O DYE: CPT | Mod: TC

## 2023-01-23 PROCEDURE — 1159F PR MEDICATION LIST DOCUMENTED IN MEDICAL RECORD: ICD-10-PCS | Mod: CPTII,,, | Performed by: STUDENT IN AN ORGANIZED HEALTH CARE EDUCATION/TRAINING PROGRAM

## 2023-01-23 PROCEDURE — 99212 OFFICE O/P EST SF 10 MIN: CPT | Mod: PBBFAC,25 | Performed by: STUDENT IN AN ORGANIZED HEALTH CARE EDUCATION/TRAINING PROGRAM

## 2023-01-23 PROCEDURE — 99213 PR OFFICE/OUTPT VISIT, EST, LEVL III, 20-29 MIN: ICD-10-PCS | Mod: S$PBB,,, | Performed by: STUDENT IN AN ORGANIZED HEALTH CARE EDUCATION/TRAINING PROGRAM

## 2023-01-23 PROCEDURE — 72131 CT LUMBAR SPINE WITHOUT CONTRAST: ICD-10-PCS | Mod: 26,,, | Performed by: RADIOLOGY

## 2023-01-23 PROCEDURE — 1160F RVW MEDS BY RX/DR IN RCRD: CPT | Mod: CPTII,,, | Performed by: STUDENT IN AN ORGANIZED HEALTH CARE EDUCATION/TRAINING PROGRAM

## 2023-01-23 PROCEDURE — 99999 PR PBB SHADOW E&M-EST. PATIENT-LVL II: ICD-10-PCS | Mod: PBBFAC,,, | Performed by: STUDENT IN AN ORGANIZED HEALTH CARE EDUCATION/TRAINING PROGRAM

## 2023-01-23 PROCEDURE — 1160F PR REVIEW ALL MEDS BY PRESCRIBER/CLIN PHARMACIST DOCUMENTED: ICD-10-PCS | Mod: CPTII,,, | Performed by: STUDENT IN AN ORGANIZED HEALTH CARE EDUCATION/TRAINING PROGRAM

## 2023-01-23 RX ORDER — GABAPENTIN 300 MG/1
600 CAPSULE ORAL 3 TIMES DAILY
Qty: 180 CAPSULE | Refills: 11 | Status: ON HOLD | OUTPATIENT
Start: 2023-01-23 | End: 2023-02-10 | Stop reason: SDUPTHER

## 2023-01-23 RX ORDER — TIZANIDINE HYDROCHLORIDE 2 MG/1
CAPSULE, GELATIN COATED ORAL
Status: ON HOLD | COMMUNITY
Start: 2023-01-06 | End: 2023-02-10 | Stop reason: HOSPADM

## 2023-01-23 NOTE — PROGRESS NOTES
Neurosurgery  Established Patient    SUBJECTIVE:     History of Present Illness:  Dexter Gupta is a 22 year old female with severe lower back & distal left leg pain since August and large central disc herniation at L4-5 with associated canal stenosis who returns today for follow up evaluation.  Since their last visit, she tried SADI again on 12/19 without improvement.  Symptoms are worse.  Pain is currently 9/10 and gets to 10/10 at the end of every day.  Denies new weakness but unable to tolerate walking more than a few steps.  New left leg sensory changes over the distal anterior leg and dorsal foot described as cold water being poured over it with occasional paresthesias.  Her distal left leg pain does not radiate from the back and she denies any pain or sensory changes in the proximal LLE.  Continues to take gabapentin 300mg tid & tizanidine. No urinary incontinence.    Review of patient's allergies indicates:  No Known Allergies    Current Outpatient Medications   Medication Sig Dispense Refill    gabapentin (NEURONTIN) 300 MG capsule Take 300 mg by mouth 3 (three) times daily.      medroxyPROGESTERone (PROVERA) 10 MG tablet Take 1 tablet (10 mg total) by mouth once daily. 30 tablet 12    naproxen (NAPROSYN) 250 MG tablet Take 250 mg by mouth 2 (two) times daily.      propranoloL (INDERAL LA) 80 MG 24 hr capsule Take 80 mg by mouth once daily.      tiZANidine 2 mg Cap       fluconazole (DIFLUCAN) 150 MG Tab Take one pill and if symptoms persist after 72 hours, take the second pill. (Patient not taking: Reported on 11/25/2022) 2 tablet 0     No current facility-administered medications for this visit.       History reviewed. No pertinent past medical history.  History reviewed. No pertinent surgical history.  Family History       Problem Relation (Age of Onset)    No Known Problems Mother, Father          Social History     Socioeconomic History    Marital status: Single   Tobacco Use    Smoking status: Never     Smokeless tobacco: Never   Substance and Sexual Activity    Alcohol use: Yes     Comment: rare    Drug use: Never    Sexual activity: Yes     Partners: Male     Birth control/protection: Patch, Condom       Review of Systems    OBJECTIVE:     Vital Signs  Pain Score:   9  There is no height or weight on file to calculate BMI.    Neurosurgery Physical Exam  General: well developed, well nourished, no distress.     Neuro:  Mental Status: Alert and oriented. Oriented x 4  Language/language: No aphasia. No dysarthria.   Cranial nerves: PERRL, EOMI, face symmetric   Motor Strength:  Strength  Deltoids Triceps Biceps Wrist Extension Wrist Flexion Hand    Upper: R 5/5 5/5 5/5 5/5 5/5 5/5    L 5/5 5/5 5/5 5/5 5/5 5/5     Iliopsoas Quadriceps Knee  Flexion Tibialis  anterior Gastro- cnemius EHL   Lower: R 5/5 5/5 5/5 5/5 5/5 5/5    L 5/5 5/5 5/5 4/5 5/5 4/5   Reflexes: clonus negative bilateral.  Gait stable. Able to walk on heels & toes  Spine: leans to the right when upright with decreased lumbar ROM 2/2 pain    Diagnostic Results:  CT L spine and lumbar xrays were reviewed    ASSESSMENT/PLAN:     23 yo female with lower back pain and distal left leg pain and central disc herniation at L4-5. Symptoms are persistent after trial of conservative management to include weight loss, PT, medication (NSAID, muscle relaxant & gabapentin), and SADI.  Since her initial MRI, she has had progressive symptoms now with worsening pain and sensory changes involving the left leg.  Her left leg pain & paresthesias are focal and non-radiating and she does not have significant foraminal stenosis on her prior MRI.  She will need an updated MRI and EMG/NCV prior to considering surgical intervention. We also discussed increasing her gabapentin dose to 600mg and will send an updated Rx.    I also reviewed the radiology report with Dexter and recommended f/u with her OB/Gyn.          Note dictated with voice recognition software, please  excuse any grammatical errors.

## 2023-01-25 ENCOUNTER — PROCEDURE VISIT (OUTPATIENT)
Dept: NEUROLOGY | Facility: CLINIC | Age: 23
End: 2023-01-25
Payer: MEDICAID

## 2023-01-25 ENCOUNTER — TELEPHONE (OUTPATIENT)
Dept: OBSTETRICS AND GYNECOLOGY | Facility: CLINIC | Age: 23
End: 2023-01-25
Payer: MEDICAID

## 2023-01-25 DIAGNOSIS — M54.16 LUMBAR RADICULOPATHY, CHRONIC: ICD-10-CM

## 2023-01-25 PROCEDURE — 95886 MUSC TEST DONE W/N TEST COMP: CPT | Mod: PBBFAC | Performed by: PHYSICAL MEDICINE & REHABILITATION

## 2023-01-25 PROCEDURE — 95910 PR NERVE CONDUCTION STUDY; 7-8 STUDIES: ICD-10-PCS | Mod: 26,S$PBB,, | Performed by: PHYSICAL MEDICINE & REHABILITATION

## 2023-01-25 PROCEDURE — 95886 MUSC TEST DONE W/N TEST COMP: CPT | Mod: 26,S$PBB,, | Performed by: PHYSICAL MEDICINE & REHABILITATION

## 2023-01-25 PROCEDURE — 95910 NRV CNDJ TEST 7-8 STUDIES: CPT | Mod: PBBFAC | Performed by: PHYSICAL MEDICINE & REHABILITATION

## 2023-01-25 PROCEDURE — 95886 PR EMG COMPLETE, W/ NERVE CONDUCTION STUDIES, 5+ MUSCLES: ICD-10-PCS | Mod: 26,S$PBB,, | Performed by: PHYSICAL MEDICINE & REHABILITATION

## 2023-01-25 PROCEDURE — 95910 NRV CNDJ TEST 7-8 STUDIES: CPT | Mod: 26,S$PBB,, | Performed by: PHYSICAL MEDICINE & REHABILITATION

## 2023-01-25 NOTE — PROCEDURES
Test Date:  2023    Patient: Dexter Gupta : 2000 Physician: Yeison Lindo D.O.   ID#: [id number] SEX: Female Ref. Phys: Lorie Juarez MD     HPI: Dexter Gupta is a 22 y.o.female who presents for NCS/EMG to evaluate left leg n/t along with low back pain.  MRI a few months ago showed L4/5 disc herniation, repeat pending.      NCV & EMG Findings:  Evaluation of the left Fibular (EDB) motor nerve showed reduced amplitude (75% smaller than the opposite side).  There is no focal slowing of this nerve, just diffuse amplitude reduction.    All remaining nerves (as indicated in the following tables) were within normal limits.  Needle evaluation of the left Tibialis Anterior and the left Lumbo Paraspinals (Lower) muscles showed increased insertional activity and slightly increased spontaneous activity.  The left Extensor Hallucis Longus muscle showed increased insertional activity, moderately increased spontaneous activity, and diminished recruitment.  All remaining muscles (as indicated in the following table) showed no evidence of electrical instability.    Impression:  There is evidence to suggest an acute left L5 radiculopathy with active denervation in the tibialis anterior, EHL, and lower lumbosacral paraspinals.       ___________________________  Yeison Lindo D.O.        NCS+  Motor Nerve Results      Latency Amplitude F-Lat Segment Distance CV Comment   Site (ms) Norm (mV) Norm (ms)  (cm) (m/s) Norm    Left Fibular (EDB)   Ankle 3.9  < 6.5 *1.63  > 2.6         Bel Fib Head 9.7 - 1.07 -  Bel Fib Head-Ankle 34 59  > 43    Pop Fossa 11.6 - 2.6 -  Pop Fossa-Bel Fib Head 10 53  > 42    Right Fibular (EDB)   Ankle 3.6  < 6.5 6.2  > 2.6         Bel Fib Head 9.3 - 5.4 -  Bel Fib Head-Ankle 33 58  > 43    Left Tibial (AHB)   Ankle 4.6  < 6.1 16.3  > 5.8         Right Tibial (AHB)   Ankle 4.7  < 6.1 23.8  > 5.8           Sensory Nerve Results      Latency (Peak) Amplitude (P-P) Segment Distance  CV Comment   Site (ms) Norm (µV) Norm  (cm) (m/s) Norm    Left Sural   Calf-Lat Mall 2.4  < 4.5 35  > 4 Calf-Lat Mall 14 58  > 35    Right Sural   Calf-Lat Mall 1.93  < 4.5 34  > 4 Calf-Lat Mall 14 73  > 35    Left Superficial Fibular   14 cm-Ankle 2.8  < 4.2 25  > 5 14 cm-Ankle 14 50  > 32    Right Superficial Fibular   14 cm-Ankle 2.6  < 4.2 36  > 5 14 cm-Ankle 14 54  > 32      EMG+     Side Muscle Nerve Root Ins Act Fibs Psw Amp Dur Poly Recrt Int Pat Comment   Left Vastus Med Femoral L2-L4 Nml Nml Nml Nml Nml 0 Nml Nml    Left Tib Anterior Fibular,  Deep Fibula... L4-L5 *Incr *1+ *1+ Nml Nml 0 Nml Nml    Left Fib Longus Fibular,  Superficial... L5-S1 Nml Nml Nml Nml Nml 0 Nml Nml    Left Biceps Fem SH Sciatic L5-S1 Nml Nml Nml Nml Nml 0 Nml Nml    Left Lumbo Parasp (Lower) Rami L5-S1 *Incr *1+ *1+ Nml        Left Ext Bergeron Long Fibular,  Deep Fibula... L5-S1 *Incr *2+ *2+ Nml Nml 0 *Reduced Nml    Left Gluteus Med Sup Gluteal L5-S1 Nml Nml Nml Nml Nml 0 Nml Nml    Left Gastroc Tibial S1-S2 Nml Nml Nml Nml Nml 0 Nml Nml            Waveforms:    Motor              Sensory

## 2023-01-25 NOTE — TELEPHONE ENCOUNTER
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  Recommend further evaluation with pelvic ultrasound."  Dr Juarez would like them to follow up with Dr Castellanos soonest available appointment. Thanks!    Sloane"

## 2023-01-27 ENCOUNTER — PATIENT MESSAGE (OUTPATIENT)
Dept: ADMINISTRATIVE | Facility: OTHER | Age: 23
End: 2023-01-27
Payer: MEDICAID

## 2023-01-27 ENCOUNTER — TELEPHONE (OUTPATIENT)
Dept: NEUROSURGERY | Facility: CLINIC | Age: 23
End: 2023-01-27
Payer: MEDICAID

## 2023-01-27 DIAGNOSIS — M54.16 LUMBAR RADICULOPATHY, CHRONIC: ICD-10-CM

## 2023-01-27 DIAGNOSIS — M51.26 LUMBAR DISC HERNIATION: Primary | ICD-10-CM

## 2023-01-27 NOTE — TELEPHONE ENCOUNTER
Spoke w pt mom and informed Dr Juarez scheduled surgery for 2/10/2023. Mom verbalized understanding

## 2023-01-30 ENCOUNTER — TELEPHONE (OUTPATIENT)
Dept: NEUROSURGERY | Facility: CLINIC | Age: 23
End: 2023-01-30
Payer: MEDICAID

## 2023-01-30 ENCOUNTER — PATIENT MESSAGE (OUTPATIENT)
Dept: SURGERY | Facility: HOSPITAL | Age: 23
End: 2023-01-30
Payer: MEDICAID

## 2023-01-30 ENCOUNTER — TELEPHONE (OUTPATIENT)
Dept: PREADMISSION TESTING | Facility: HOSPITAL | Age: 23
End: 2023-01-30

## 2023-01-30 NOTE — TELEPHONE ENCOUNTER
----- Message from Sloane Cruz MA sent at 1/27/2023  2:23 PM CST -----  Good Afternoon!    Dr Juarez's requesting anesthesia and medical eval for this pt prior to 2/10/2023 surgery. Any assistance scheduling will be appreciated!    Sloane

## 2023-02-02 ENCOUNTER — PATIENT MESSAGE (OUTPATIENT)
Dept: SURGERY | Facility: HOSPITAL | Age: 23
End: 2023-02-02
Payer: MEDICAID

## 2023-02-02 ENCOUNTER — OFFICE VISIT (OUTPATIENT)
Dept: INTERNAL MEDICINE | Facility: CLINIC | Age: 23
End: 2023-02-02
Payer: MEDICAID

## 2023-02-02 ENCOUNTER — HOSPITAL ENCOUNTER (OUTPATIENT)
Dept: CARDIOLOGY | Facility: CLINIC | Age: 23
Discharge: HOME | End: 2023-02-02
Payer: MEDICAID

## 2023-02-02 VITALS
DIASTOLIC BLOOD PRESSURE: 67 MMHG | HEIGHT: 63 IN | TEMPERATURE: 98 F | OXYGEN SATURATION: 96 % | WEIGHT: 252 LBS | SYSTOLIC BLOOD PRESSURE: 119 MMHG | BODY MASS INDEX: 44.65 KG/M2 | HEART RATE: 86 BPM

## 2023-02-02 DIAGNOSIS — R00.0 TACHYCARDIA: ICD-10-CM

## 2023-02-02 DIAGNOSIS — F41.1 GENERALIZED ANXIETY DISORDER: ICD-10-CM

## 2023-02-02 DIAGNOSIS — M47.27 OTHER SPONDYLOSIS WITH RADICULOPATHY, LUMBOSACRAL REGION: ICD-10-CM

## 2023-02-02 DIAGNOSIS — M54.42 CHRONIC LEFT-SIDED LOW BACK PAIN WITH LEFT-SIDED SCIATICA: ICD-10-CM

## 2023-02-02 DIAGNOSIS — G89.29 CHRONIC LEFT-SIDED LOW BACK PAIN WITH LEFT-SIDED SCIATICA: ICD-10-CM

## 2023-02-02 DIAGNOSIS — Z01.818 PRE-OP EVALUATION: ICD-10-CM

## 2023-02-02 DIAGNOSIS — Z91.89 HISTORY OF GENERAL ANESTHESIA COMPLICATION: ICD-10-CM

## 2023-02-02 DIAGNOSIS — M79.609 PAIN IN EXTREMITY, UNSPECIFIED EXTREMITY: Primary | ICD-10-CM

## 2023-02-02 PROCEDURE — 3078F PR MOST RECENT DIASTOLIC BLOOD PRESSURE < 80 MM HG: ICD-10-PCS | Mod: CPTII,,, | Performed by: NURSE PRACTITIONER

## 2023-02-02 PROCEDURE — 93010 ELECTROCARDIOGRAM REPORT: CPT | Mod: S$PBB,,, | Performed by: INTERNAL MEDICINE

## 2023-02-02 PROCEDURE — 3074F SYST BP LT 130 MM HG: CPT | Mod: CPTII,,, | Performed by: NURSE PRACTITIONER

## 2023-02-02 PROCEDURE — 99214 OFFICE O/P EST MOD 30 MIN: CPT | Mod: PBBFAC | Performed by: NURSE PRACTITIONER

## 2023-02-02 PROCEDURE — 93005 ELECTROCARDIOGRAM TRACING: CPT | Mod: PBBFAC | Performed by: INTERNAL MEDICINE

## 2023-02-02 PROCEDURE — 93010 EKG 12-LEAD: ICD-10-PCS | Mod: S$PBB,,, | Performed by: INTERNAL MEDICINE

## 2023-02-02 PROCEDURE — 99999 PR PBB SHADOW E&M-EST. PATIENT-LVL IV: CPT | Mod: PBBFAC,,, | Performed by: NURSE PRACTITIONER

## 2023-02-02 PROCEDURE — 3074F PR MOST RECENT SYSTOLIC BLOOD PRESSURE < 130 MM HG: ICD-10-PCS | Mod: CPTII,,, | Performed by: NURSE PRACTITIONER

## 2023-02-02 PROCEDURE — 3008F BODY MASS INDEX DOCD: CPT | Mod: CPTII,,, | Performed by: NURSE PRACTITIONER

## 2023-02-02 PROCEDURE — 1159F PR MEDICATION LIST DOCUMENTED IN MEDICAL RECORD: ICD-10-PCS | Mod: CPTII,,, | Performed by: NURSE PRACTITIONER

## 2023-02-02 PROCEDURE — 3078F DIAST BP <80 MM HG: CPT | Mod: CPTII,,, | Performed by: NURSE PRACTITIONER

## 2023-02-02 PROCEDURE — 99999 PR PBB SHADOW E&M-EST. PATIENT-LVL IV: ICD-10-PCS | Mod: PBBFAC,,, | Performed by: NURSE PRACTITIONER

## 2023-02-02 PROCEDURE — 3008F PR BODY MASS INDEX (BMI) DOCUMENTED: ICD-10-PCS | Mod: CPTII,,, | Performed by: NURSE PRACTITIONER

## 2023-02-02 PROCEDURE — 1159F MED LIST DOCD IN RCRD: CPT | Mod: CPTII,,, | Performed by: NURSE PRACTITIONER

## 2023-02-02 PROCEDURE — 99204 OFFICE O/P NEW MOD 45 MIN: CPT | Mod: S$PBB,,, | Performed by: NURSE PRACTITIONER

## 2023-02-02 PROCEDURE — 99204 PR OFFICE/OUTPT VISIT, NEW, LEVL IV, 45-59 MIN: ICD-10-PCS | Mod: S$PBB,,, | Performed by: NURSE PRACTITIONER

## 2023-02-02 RX ORDER — ACETAMINOPHEN 500 MG
1000 TABLET ORAL EVERY 8 HOURS PRN
COMMUNITY
End: 2023-04-04

## 2023-02-02 RX ORDER — IBUPROFEN 200 MG
200 TABLET ORAL EVERY 6 HOURS PRN
COMMUNITY
End: 2023-04-04
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
## 2023-02-02 NOTE — OUTPATIENT SUBJECTIVE & OBJECTIVE
Outpatient Subjective & Objective      Chief Complaint: Preoperative evaulation, perioperative medical management, and complication reduction plan.     Functional Capacity:  Able to climb a flight of stairs without CP SOB or Syncope.  Able to meet 4 METs  was previously walking for exercise had to stop due to pain      Anesthesia issues: TMJ ; Has had combative and disoriented behavior when emerging from anesthesia- she says she needs a quiet approach and may need mother     Difficulty mouth opening: none    Steroid use in the last 12 months: 2 epidural injections and steroid dose pack for pain     Dental Issues: TMJ    Family anesthesia difficulty: Mother had cardiac arrest during hysterectomy     Last monthly period  January 2023    Family Hx of Thrombosis none known    Past Medical History:   Diagnosis Date    Anxiety     Asthma     Other spondylosis with radiculopathy, lumbosacral region 10/17/2022     Past Surgical History:   Procedure Laterality Date    TONSILLECTOMY      TYMPANOSTOMY TUBE PLACEMENT Bilateral     3 separates operations; 1998, 1999, 2001       Review of Systems   Constitutional:  Negative for chills, fatigue and fever.   HENT:  Negative for trouble swallowing and voice change.    Eyes:  Negative for photophobia and visual disturbance.        No acute visual changes   Respiratory:  Negative for apnea, cough, chest tightness, shortness of breath and wheezing.         STOP bang  Score 1  Low risk LOLLY     Cardiovascular:  Negative for chest pain, palpitations and leg swelling.   Gastrointestinal:  Negative for abdominal distention, abdominal pain, blood in stool, constipation, diarrhea, nausea and vomiting.        NO FLD, hepatitis, cirrhosis  No BRB or black tarry stool     Endocrine: Negative for cold intolerance, heat intolerance, polydipsia, polyphagia and polyuria.   Genitourinary:  Negative for difficulty urinating, dysuria, flank pain, frequency, hematuria and urgency.        Incomplete  "emptying of bladder   Musculoskeletal:  Positive for back pain and gait problem. Negative for arthralgias, joint swelling, myalgias, neck pain and neck stiffness.   Neurological:  Positive for numbness and headaches. Negative for dizziness, tremors, seizures, syncope, weakness and light-headedness.        Left leg numbness   Psychiatric/Behavioral:  Negative for suicidal ideas. The patient is not nervous/anxious.         VITALS  Visit Vitals  /67 (BP Location: Left arm, Patient Position: Sitting)   Pulse 86   Temp 98.1 °F (36.7 °C) (Oral)   Ht 5' 3" (1.6 m)   Wt 114.3 kg (252 lb)   SpO2 96%   BMI 44.64 kg/m²          Physical Exam  Constitutional:       General: She is not in acute distress.     Appearance: Normal appearance. She is well-developed. She is not diaphoretic.   HENT:      Head: Normocephalic.      Right Ear: Hearing normal.      Left Ear: Hearing normal.      Nose: Nose normal.      Mouth/Throat:      Lips: Pink.      Mouth: Mucous membranes are moist.      Pharynx: No oropharyngeal exudate.   Eyes:      General: Lids are normal.         Right eye: No discharge.         Left eye: No discharge.      Conjunctiva/sclera: Conjunctivae normal.      Pupils: Pupils are equal, round, and reactive to light.   Neck:      Thyroid: No thyromegaly.      Vascular: No carotid bruit or JVD.      Trachea: Trachea and phonation normal. No tracheal deviation.   Cardiovascular:      Rate and Rhythm: Normal rate and regular rhythm.      Pulses: Normal pulses.           Carotid pulses are 2+ on the right side and 2+ on the left side.       Radial pulses are 2+ on the right side and 2+ on the left side.        Posterior tibial pulses are 2+ on the right side and 2+ on the left side.      Heart sounds: Normal heart sounds. No murmur heard.    No friction rub. No gallop.   Pulmonary:      Effort: Pulmonary effort is normal. No respiratory distress.      Breath sounds: Normal breath sounds. No stridor. No wheezing or " rales.   Chest:      Chest wall: No tenderness.   Abdominal:      General: Abdomen is flat. Bowel sounds are normal. There is no distension.      Palpations: Abdomen is soft.      Tenderness: There is no abdominal tenderness. There is no guarding.   Musculoskeletal:         General: No tenderness or deformity. Normal range of motion.      Cervical back: Normal range of motion and neck supple. No rigidity.      Right lower leg: No edema.      Left lower leg: No edema.   Lymphadenopathy:      Head:      Right side of head: No submental, submandibular, tonsillar, preauricular, posterior auricular or occipital adenopathy.      Left side of head: No submental, submandibular, tonsillar, preauricular, posterior auricular or occipital adenopathy.      Cervical: No cervical adenopathy.   Skin:     General: Skin is warm and dry.      Capillary Refill: Capillary refill takes 2 to 3 seconds.      Coloration: Skin is not pale.      Findings: No erythema or rash.   Neurological:      Mental Status: She is alert and oriented to person, place, and time. Mental status is at baseline.      GCS: GCS eye subscore is 4. GCS verbal subscore is 5. GCS motor subscore is 6.      Motor: No abnormal muscle tone.      Coordination: Coordination normal.   Psychiatric:         Attention and Perception: Attention and perception normal.         Mood and Affect: Mood and affect normal.         Speech: Speech normal.         Behavior: Behavior normal. Behavior is cooperative.         Thought Content: Thought content normal.         Cognition and Memory: Cognition and memory normal.         Judgment: Judgment normal.        Significant Labs:  Lab Results   Component Value Date    WBC 5.85 02/02/2023    HGB 12.5 02/02/2023    HCT 39.4 02/02/2023     02/02/2023    ALT 27 02/02/2023    AST 21 02/02/2023     02/02/2023    K 4.0 02/02/2023     02/02/2023    CREATININE 0.7 02/02/2023    BUN 8 02/02/2023    CO2 26 02/02/2023    TSH 1.012  04/29/2022    INR 1.0 02/02/2023       Diagnostic Studies: No relevant studies.    EKG:   Results for orders placed or performed during the hospital encounter of 02/02/23   EKG 12-lead    Collection Time: 02/02/23  9:51 AM    Narrative    Test Reason : R00.0,F41.1,M47.27,    Vent. Rate : 076 BPM     Atrial Rate : 076 BPM     P-R Int : 132 ms          QRS Dur : 088 ms      QT Int : 384 ms       P-R-T Axes : 065 084 059 degrees     QTc Int : 432 ms    Normal sinus rhythm  Normal ECG  No previous ECGs available  Confirmed by Nikunj Lane MD ([id number]) on 2/2/2023 12:20:10 PM    Referred By: SALOMON VOGEL           Confirmed By:Nikunj Lane MD       2D ECHO:  TTE:  No results found for this or any previous visit.    MYA:  No results found for this or any previous visit.     Imaging     Active Cardiac Conditions: None      Revised Cardiac Risk Index   High -Risk Surgery  Intraperitoneal; Intrathoracic; suprainguinal vascular Yes- + 1 No- 0   History of Ischemic Heart Disease   (Hx of MI/positive exercise test/current chest pain due to ischemia/use of nitrate therapy/EKG with pathological Q waves) Yes- + 1 No- 0   History of CHF  (Pulmonary edema/bilateral rales or S3 gallop/PND/CXR showing pulmonary vascular redistribution) Yes- + 1 No- 0   History of CVA   (Prior stroke or TIA) Yes- + 1 No- 0   Pre-operative treatment with insulin Yes- + 1 No- 0   Pre-operative creatinine > 2mg/dl Yes- + 1 No- 0   Total:      Risk Status:  Estimated risk of cardiac complications after non-cardiac surgery using the Revised Cardiac Risk Index for Preoperative risk is 3.9 %      ARISCAT (Canet) risk index: Low: 1.6% risk of post-op pulmonary complications.    American Society of Anesthesiologists Physical Status classification (ASA): 3           No further cardiac workup needed prior to surgery.    Outpatient Subjective & Objective

## 2023-02-02 NOTE — PATIENT INSTRUCTIONS
Preventive perioperative care    Thromboembolic prophylaxis:  Her risk factors for thrombosis include morbid obesity, surgical procedure, age, and reduced mobility.I suggest  thromboembolic prophylaxis ( mechanical/pharmacological, weighing the risk benefits of pharmacological agent use considering rudolph procedural bleeding )  during the perioperative period.I suggested being active in the post operative period.      Postoperative pulmonary complication prophylaxis-Risk factors for post operative pulmonary complications include ASA class >2- I suggest incentive spirometry use, early ambulation, and pain control so as to avoid diaphragmatic splinting  Brush teeth twice per day, oral rinses, sleep with the head of the bed up 30 degrees     Renal complication prophylaxis I suggest keeping her well hydrated and avoidance/ minimizing the use of  NSAID's,PALOMARES 2 Inhibitors ,IV contrast if possible in the perioperative period.I suggested drinking 2 litre's of water a day      Surgical site Infection Prophylaxis-I  suggest appropriate antibiotic for Prophylaxis against Surgical site infections Shower with Hibiclens in the night before surgery and the morning of surgery    This visit was focused on Preoperative evaluation, Perioperative Medical management, complication reduction plans. I suggest that the patient follows up with primary care or relevant sub specialists for ongoing health care.    I appreciate the opportunity to be involved in this patients care. Please feel free to contact me if there were any questions about this consultation.    Patient is pending optimization

## 2023-02-02 NOTE — DISCHARGE INSTRUCTIONS
Your surgery has been scheduled for:_____2/10/23_____________________________________    You should report to:  ____[facility], located on the [place] side of the first floor of the           [facility] ([phone])  _X___[facility], located on the [facility] side of the            Second floor of the [facility] ([phone])  ____3rd Floor [facility] located on the [facility] side of the [facility] [phone]  ____[facility]: located at [address] [facility] [place] [id number]. [address]     Please Note   Tell your doctor if you take Aspirin, products containing Aspirin, herbal medications  or blood thinners, such as Coumadin, Ticlid, or Plavix.  (Consult your provider regarding holding or stopping before surgery).  Arrange for someone to drive you home following surgery.  You will not be allowed to leave the surgical facility alone or drive yourself home following sedation and anesthesia.    Before Surgery  Stop taking all herbal medications, vitamins, and supplements 7 days prior to surgery  No Motrin/Advil (Ibuprofen) 7 days before surgery  No Aleve (Naproxen) 7 days before surgery  Stop Taking Asprin, products containing Aspirin __7___days before surgery   No Goody's/BC Powder 7 days before surgery  Refrain from drinking alcoholic beverages for 24 hours before and after surgery  Stop or limit smoking at least 24 hours prior to surgery  You may take Tylenol for pain    Night before Surgery  Do not eat or drink after midnight  Take a shower or bath (shower is recommended).  Bathe with Hibiclens soap or an antibacterial soap from the neck down.  If not supplied by your surgeon, hibiclens soap will need to be purchased over the counter in pharmacy.  Rinse soap off thoroughly.  Shampoo your hair with your regular shampoo    The Day of Surgery  Take another bath or shower with hibiclens  or any antibacterial soap, to reduce the chance of infection.  Take heart and blood pressure medications with a small sip of water, as advised by the perioperative team.  Do not take fluid pills  You may brush your teeth and rinse your mouth, but do not swallow any additional water.   Do not apply perfumes, powder, body lotions or deodorant on the day of surgery.  Nail polish should be removed.  Do not wear makeup or moisturizer  Wear comfortable clothes, such as a button front shirt and loose fitting pants.  Leave all jewelry, including body piercings, and valuables at home.    Bring any devices you will neeed after surgery such as crutches or canes.  If you have sleep apnea, please bring your CPAP machine  In the event that your physical condition changes including the onset of a cold or respiratory illness, or if you have to delay or cancel your surgery, please notify your surgeon.
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
## 2023-02-02 NOTE — ASSESSMENT & PLAN NOTE
Takes Propranolol 80 mg nightly to help with HR and Migraines    Test Reason : R00.0,F41.1,M47.27,     Vent. Rate : 076 BPM     Atrial Rate : 076 BPM      P-R Int : 132 ms          QRS Dur : 088 ms       QT Int : 384 ms       P-R-T Axes : 065 084 059 degrees      QTc Int : 432 ms     Normal sinus rhythm   Normal ECG   No previous ECGs available   Confirmed by Domingo RAMIRES, Nikunj JACKSON ([id number]) on 2/2/2023 12:20:10 PM     Referred By: SALOMON VOGEL           Confirmed By:Nikunj Lane MD

## 2023-02-02 NOTE — HPI
This is a 22 y.o. female  who presents today for a preoperative evaluation in preparation for a Neurosurgery  procedure.  Scheduled for  2/10/23  Surgery is indicated for Left L4-L5 microdiscectomy of the Spine.   Patient is new to me.  Details of current problem: The duration of problem is   started August 2021; started after getting out of the floor  Reports symptoms of  pain, continuous lower back and left leg pain- pain in calf burning, shooting throbbing, popping  Aggravating factors include: prolonged sitting, standing, walking  No lifting in her life due to back trouble  Relieving factors are  none work, tried positioning, ice, heat, stretching  Treated with PTx , Gabapentin, Aleve or Motrin  Reports pain: 8/10  The history has been obtained by speaking with the patient and reviewing the electronic medical record and/or outside health information. Significant health conditions for the perioperative period are discussed below in assessment and plan.     Patient reports current health status to be Fair.  Denies any new symptoms before surgery.

## 2023-02-02 NOTE — ASSESSMENT & PLAN NOTE
Propranolol 80 mg nightly for migraine prevention  No rescue meds at this time  Takes tylenol and ibuprofen for pain as needed

## 2023-02-02 NOTE — ASSESSMENT & PLAN NOTE
Tried 2 drugs and caused bad side effects and she no longer takes meds  Uses self coping mechanisms for anxiety- states her back troubles a big source of her anxiety

## 2023-02-06 ENCOUNTER — HOSPITAL ENCOUNTER (OUTPATIENT)
Dept: RADIOLOGY | Facility: HOSPITAL | Age: 23
Discharge: HOME | End: 2023-02-06
Attending: STUDENT IN AN ORGANIZED HEALTH CARE EDUCATION/TRAINING PROGRAM
Payer: MEDICAID

## 2023-02-06 ENCOUNTER — OFFICE VISIT (OUTPATIENT)
Dept: NEUROSURGERY | Facility: CLINIC | Age: 23
End: 2023-02-06
Payer: MEDICAID

## 2023-02-06 DIAGNOSIS — M54.16 LUMBAR RADICULOPATHY, CHRONIC: ICD-10-CM

## 2023-02-06 DIAGNOSIS — M54.16 LEFT LUMBAR RADICULOPATHY: Primary | ICD-10-CM

## 2023-02-06 DIAGNOSIS — M47.816 LUMBAR SPONDYLOSIS: ICD-10-CM

## 2023-02-06 DIAGNOSIS — M51.16 LUMBAR DISC HERNIATION WITH RADICULOPATHY: ICD-10-CM

## 2023-02-06 DIAGNOSIS — M54.50 DORSALGIA OF LUMBAR REGION: ICD-10-CM

## 2023-02-06 PROCEDURE — 72148 MRI LUMBAR SPINE W/O DYE: CPT | Mod: 26,,, | Performed by: RADIOLOGY

## 2023-02-06 PROCEDURE — 72148 MRI LUMBAR SPINE W/O DYE: CPT | Mod: TC

## 2023-02-06 PROCEDURE — 1160F PR REVIEW ALL MEDS BY PRESCRIBER/CLIN PHARMACIST DOCUMENTED: ICD-10-PCS | Mod: CPTII,95,, | Performed by: STUDENT IN AN ORGANIZED HEALTH CARE EDUCATION/TRAINING PROGRAM

## 2023-02-06 PROCEDURE — 1159F MED LIST DOCD IN RCRD: CPT | Mod: CPTII,95,, | Performed by: STUDENT IN AN ORGANIZED HEALTH CARE EDUCATION/TRAINING PROGRAM

## 2023-02-06 PROCEDURE — 1160F RVW MEDS BY RX/DR IN RCRD: CPT | Mod: CPTII,95,, | Performed by: STUDENT IN AN ORGANIZED HEALTH CARE EDUCATION/TRAINING PROGRAM

## 2023-02-06 PROCEDURE — 72148 MRI LUMBAR SPINE WITHOUT CONTRAST: ICD-10-PCS | Mod: 26,,, | Performed by: RADIOLOGY

## 2023-02-06 PROCEDURE — 99214 OFFICE O/P EST MOD 30 MIN: CPT | Mod: 95,,, | Performed by: STUDENT IN AN ORGANIZED HEALTH CARE EDUCATION/TRAINING PROGRAM

## 2023-02-06 PROCEDURE — 99214 PR OFFICE/OUTPT VISIT, EST, LEVL IV, 30-39 MIN: ICD-10-PCS | Mod: 95,,, | Performed by: STUDENT IN AN ORGANIZED HEALTH CARE EDUCATION/TRAINING PROGRAM

## 2023-02-06 PROCEDURE — 1159F PR MEDICATION LIST DOCUMENTED IN MEDICAL RECORD: ICD-10-PCS | Mod: CPTII,95,, | Performed by: STUDENT IN AN ORGANIZED HEALTH CARE EDUCATION/TRAINING PROGRAM

## 2023-02-06 NOTE — H&P (VIEW-ONLY)
The patient location is: [place]  The chief complaint leading to consultation is: f/u after updated imaging; surgical discussion    Visit type: audiovisual    Face to Face time with patient: 20 min  40 minutes of total time spent on the encounter, which includes face to face time and non-face to face time preparing to see the patient (eg, review of tests), Obtaining and/or reviewing separately obtained history, Documenting clinical information in the electronic or other health record, Independently interpreting results (not separately reported) and communicating results to the patient/family/caregiver, or Care coordination (not separately reported).         Each patient to whom he or she provides medical services by telemedicine is:  (1) informed of the relationship between the physician and patient and the respective role of any other health care provider with respect to management of the patient; and (2) notified that he or she may decline to receive medical services by telemedicine and may withdraw from such care at any time.    Notes:     Digital Medicine: Video Consult    Dexter Gupta is a 23 yo female with lower back pain and distal left leg pain and central disc herniation at L4-5. Symptoms are persistent after trial of conservative management to include weight loss, PT, medication (NSAID, muscle relaxant & gabapentin), and SADI.  She has progressive symptoms with worsening pain and sensory changes involving the left leg now with left foot drop.  She returns today after updated MRI and completion of EMG/NCV.  She denies any changes since her last clinic visit.    Her MRI lumbar spine was personally reviewed. Dexter has a large left paracentral disc herniation at L4-5 associated with left foraminal narrowing and severe central canal stenosis with clinical symptoms and EMG/NCV findings consistent with left L5 radiculopathy.      At this point she has failed a trial of medical management and has progressive  radicular symptoms, now with weakness in the left AT/EHL and is likely to benefit from surgical decompression.  I discussed lumbar L4-5 MIS discectomy in detail with the patient included the expected post operative course & recovery, related risks, benefits, and alternatives to the procedure. Risks include, but are not limited to, bleeding, pain, infection, scarring, need for further/repeat procedure, spinal instability, adjacent segment disease, recurrent disc herniation, death, paralysis, damage to bowel/bladder/sexual function, permanent neurologic deficit, stroke/damage to major blood vessels, and symptomatic cerebrospinal fluid leak potentially requiring additional procedures. The patient expressed understanding and all questions were answered. She would like to proceed with the procedure as planned.     Patient Active Problem List   Diagnosis    Attention deficit hyperactivity disorder (ADHD)    Generalized anxiety disorder    Migraine headache    Tachycardia    Body mass index 40.0-44.9, adult    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-sided sciatica    Other spondylosis with radiculopathy, lumbosacral region    History of general anesthesia complication    Pre-op evaluation       Past Medical History:   Diagnosis Date    Anxiety     Asthma     Other spondylosis with radiculopathy, lumbosacral region 10/17/2022       Family History   Problem Relation Age of Onset    Celiac disease Mother     Cancer Father        Social History     Socioeconomic History    Marital status: Significant Other     Spouse name: Boaz    Number of children: 0   Tobacco Use    Smoking status: Never    Smokeless tobacco: Never   Substance and Sexual Activity    Alcohol use: Not Currently     Comment: rare    Drug use: Never    Sexual activity: Yes     Partners: Male     Birth control/protection: Patch, Condom   Social History Narrative    Stairs- 15       Review of patient's allergies indicates:  No Known Allergies      Current  Outpatient Medications:     acetaminophen (TYLENOL) 500 MG tablet, Take 1,000 mg by mouth every 8 (eight) hours as needed for Pain., Disp: , Rfl:     gabapentin (NEURONTIN) 300 MG capsule, Take 2 capsules (600 mg total) by mouth 3 (three) times daily., Disp: 180 capsule, Rfl: 11    ibuprofen (ADVIL,MOTRIN) 200 MG tablet, Take 200 mg by mouth every 6 (six) hours as needed for Pain., Disp: , Rfl:     medroxyPROGESTERone (PROVERA) 10 MG tablet, Take 1 tablet (10 mg total) by mouth once daily., Disp: 30 tablet, Rfl: 12    naproxen (NAPROSYN) 250 MG tablet, Take 250 mg by mouth 2 (two) times daily., Disp: , Rfl:     propranoloL (INDERAL LA) 80 MG 24 hr capsule, Take 80 mg by mouth once daily., Disp: , Rfl:     tiZANidine 2 mg Cap, , Disp: , Rfl:

## 2023-02-08 ENCOUNTER — PATIENT MESSAGE (OUTPATIENT)
Dept: SURGERY | Facility: HOSPITAL | Age: 23
End: 2023-02-08
Payer: MEDICAID

## 2023-02-09 ENCOUNTER — ANESTHESIA EVENT (OUTPATIENT)
Dept: SURGERY | Facility: HOSPITAL | Age: 23
End: 2023-02-09
Payer: MEDICAID

## 2023-02-09 ENCOUNTER — TELEPHONE (OUTPATIENT)
Dept: NEUROSURGERY | Facility: CLINIC | Age: 23
End: 2023-02-09
Payer: MEDICAID
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
## 2023-02-09 NOTE — TELEPHONE ENCOUNTER
Informed arrive 2nd floor DOSC at 6:30AM for start time of 8:30AM. NPO after midnight. Bathe night before and morning of using antibacterial soap from neck down. No lotions, deodorants or jewelry in morning of surgery. Mom verbalized understanding

## 2023-02-10 ENCOUNTER — HOSPITAL ENCOUNTER (OUTPATIENT)
Facility: HOSPITAL | Age: 23
Discharge: HOME | End: 2023-02-11
Attending: STUDENT IN AN ORGANIZED HEALTH CARE EDUCATION/TRAINING PROGRAM | Admitting: STUDENT IN AN ORGANIZED HEALTH CARE EDUCATION/TRAINING PROGRAM
Payer: MEDICAID

## 2023-02-10 ENCOUNTER — ANESTHESIA (OUTPATIENT)
Dept: SURGERY | Facility: HOSPITAL | Age: 23
End: 2023-02-10
Payer: MEDICAID

## 2023-02-10 DIAGNOSIS — M54.16 LUMBAR RADICULOPATHY, CHRONIC: ICD-10-CM

## 2023-02-10 DIAGNOSIS — M51.16 LUMBAR DISC HERNIATION WITH RADICULOPATHY: ICD-10-CM

## 2023-02-10 PROBLEM — M51.26 LUMBAR DISC HERNIATION: Status: ACTIVE | Noted: 2023-02-10

## 2023-02-10 LAB
ABO + RH BLD: NORMAL
B-HCG UR QL: NEGATIVE
BLD GP AB SCN CELLS X3 SERPL QL: NORMAL
CTP QC/QA: YES

## 2023-02-10 PROCEDURE — D9220A PRA ANESTHESIA: ICD-10-PCS | Mod: ANES,,, | Performed by: ANESTHESIOLOGY

## 2023-02-10 PROCEDURE — 00630 ANES PX LUMBAR REGION NOS: CPT | Performed by: STUDENT IN AN ORGANIZED HEALTH CARE EDUCATION/TRAINING PROGRAM

## 2023-02-10 PROCEDURE — 63600175 PHARM REV CODE 636 W HCPCS: Performed by: STUDENT IN AN ORGANIZED HEALTH CARE EDUCATION/TRAINING PROGRAM

## 2023-02-10 PROCEDURE — 25000003 PHARM REV CODE 250: Performed by: STUDENT IN AN ORGANIZED HEALTH CARE EDUCATION/TRAINING PROGRAM

## 2023-02-10 PROCEDURE — 27201423 OPTIME MED/SURG SUP & DEVICES STERILE SUPPLY: Performed by: STUDENT IN AN ORGANIZED HEALTH CARE EDUCATION/TRAINING PROGRAM

## 2023-02-10 PROCEDURE — D9220A PRA ANESTHESIA: ICD-10-PCS | Mod: CRNA,,, | Performed by: NURSE ANESTHETIST, CERTIFIED REGISTERED

## 2023-02-10 PROCEDURE — 37000008 HC ANESTHESIA 1ST 15 MINUTES: Performed by: STUDENT IN AN ORGANIZED HEALTH CARE EDUCATION/TRAINING PROGRAM

## 2023-02-10 PROCEDURE — 71000015 HC POSTOP RECOV 1ST HR: Performed by: STUDENT IN AN ORGANIZED HEALTH CARE EDUCATION/TRAINING PROGRAM

## 2023-02-10 PROCEDURE — 63030 PR LAMINOTOMY,LUMBAR DISK,1 INTRSP: ICD-10-PCS | Mod: LT,,, | Performed by: STUDENT IN AN ORGANIZED HEALTH CARE EDUCATION/TRAINING PROGRAM

## 2023-02-10 PROCEDURE — 71000033 HC RECOVERY, INTIAL HOUR: Performed by: STUDENT IN AN ORGANIZED HEALTH CARE EDUCATION/TRAINING PROGRAM

## 2023-02-10 PROCEDURE — 81025 URINE PREGNANCY TEST: CPT | Performed by: STUDENT IN AN ORGANIZED HEALTH CARE EDUCATION/TRAINING PROGRAM

## 2023-02-10 PROCEDURE — 63600175 PHARM REV CODE 636 W HCPCS: Performed by: NURSE ANESTHETIST, CERTIFIED REGISTERED

## 2023-02-10 PROCEDURE — 86900 BLOOD TYPING SEROLOGIC ABO: CPT | Performed by: STUDENT IN AN ORGANIZED HEALTH CARE EDUCATION/TRAINING PROGRAM

## 2023-02-10 PROCEDURE — 37000009 HC ANESTHESIA EA ADD 15 MINS: Performed by: STUDENT IN AN ORGANIZED HEALTH CARE EDUCATION/TRAINING PROGRAM

## 2023-02-10 PROCEDURE — 71000016 HC POSTOP RECOV ADDL HR: Performed by: STUDENT IN AN ORGANIZED HEALTH CARE EDUCATION/TRAINING PROGRAM

## 2023-02-10 PROCEDURE — 94761 N-INVAS EAR/PLS OXIMETRY MLT: CPT

## 2023-02-10 PROCEDURE — D9220A PRA ANESTHESIA: Mod: CRNA,,, | Performed by: NURSE ANESTHETIST, CERTIFIED REGISTERED

## 2023-02-10 PROCEDURE — 36415 COLL VENOUS BLD VENIPUNCTURE: CPT | Performed by: STUDENT IN AN ORGANIZED HEALTH CARE EDUCATION/TRAINING PROGRAM

## 2023-02-10 PROCEDURE — 36000710: Performed by: STUDENT IN AN ORGANIZED HEALTH CARE EDUCATION/TRAINING PROGRAM

## 2023-02-10 PROCEDURE — D9220A PRA ANESTHESIA: Mod: ANES,,, | Performed by: ANESTHESIOLOGY

## 2023-02-10 PROCEDURE — 27000221 HC OXYGEN, UP TO 24 HOURS

## 2023-02-10 PROCEDURE — 25000003 PHARM REV CODE 250: Performed by: ANESTHESIOLOGY

## 2023-02-10 PROCEDURE — 99900035 HC TECH TIME PER 15 MIN (STAT)

## 2023-02-10 PROCEDURE — 25000003 PHARM REV CODE 250: Performed by: NURSE ANESTHETIST, CERTIFIED REGISTERED

## 2023-02-10 PROCEDURE — 63600175 PHARM REV CODE 636 W HCPCS

## 2023-02-10 PROCEDURE — 36000711: Performed by: STUDENT IN AN ORGANIZED HEALTH CARE EDUCATION/TRAINING PROGRAM

## 2023-02-10 PROCEDURE — 63030 LAMOT DCMPRN NRV RT 1 LMBR: CPT | Mod: LT,,, | Performed by: STUDENT IN AN ORGANIZED HEALTH CARE EDUCATION/TRAINING PROGRAM

## 2023-02-10 PROCEDURE — 94799 UNLISTED PULMONARY SVC/PX: CPT

## 2023-02-10 RX ORDER — HALOPERIDOL 5 MG/ML
INJECTION INTRAMUSCULAR
Status: DISCONTINUED | OUTPATIENT
Start: 2023-02-10 | End: 2023-02-10

## 2023-02-10 RX ORDER — ROCURONIUM BROMIDE 10 MG/ML
INJECTION, SOLUTION INTRAVENOUS
Status: DISCONTINUED | OUTPATIENT
Start: 2023-02-10 | End: 2023-02-10

## 2023-02-10 RX ORDER — ACETAMINOPHEN 500 MG
1000 TABLET ORAL
Status: COMPLETED | OUTPATIENT
Start: 2023-02-10 | End: 2023-02-10

## 2023-02-10 RX ORDER — FENTANYL CITRATE 50 UG/ML
INJECTION, SOLUTION INTRAMUSCULAR; INTRAVENOUS
Status: DISCONTINUED | OUTPATIENT
Start: 2023-02-10 | End: 2023-02-10

## 2023-02-10 RX ORDER — DEXMEDETOMIDINE HYDROCHLORIDE 100 UG/ML
INJECTION, SOLUTION INTRAVENOUS
Status: DISCONTINUED | OUTPATIENT
Start: 2023-02-10 | End: 2023-02-10

## 2023-02-10 RX ORDER — LIDOCAINE HYDROCHLORIDE 10 MG/ML
INJECTION, SOLUTION INTRAVENOUS
Status: DISCONTINUED | OUTPATIENT
Start: 2023-02-10 | End: 2023-02-10

## 2023-02-10 RX ORDER — KETAMINE HCL IN 0.9 % NACL 50 MG/5 ML
SYRINGE (ML) INTRAVENOUS
Status: DISCONTINUED | OUTPATIENT
Start: 2023-02-10 | End: 2023-02-10

## 2023-02-10 RX ORDER — FENTANYL CITRATE 50 UG/ML
INJECTION, SOLUTION INTRAMUSCULAR; INTRAVENOUS
Status: COMPLETED
Start: 2023-02-10 | End: 2023-02-10

## 2023-02-10 RX ORDER — LIDOCAINE HYDROCHLORIDE 10 MG/ML
1 INJECTION, SOLUTION EPIDURAL; INFILTRATION; INTRACAUDAL; PERINEURAL ONCE
Status: COMPLETED | OUTPATIENT
Start: 2023-02-10 | End: 2023-02-10

## 2023-02-10 RX ORDER — AMOXICILLIN 250 MG
2 CAPSULE ORAL NIGHTLY PRN
Status: DISCONTINUED | OUTPATIENT
Start: 2023-02-10 | End: 2023-02-11 | Stop reason: HOSPADM

## 2023-02-10 RX ORDER — CEFAZOLIN SODIUM 1 G/3ML
INJECTION, POWDER, FOR SOLUTION INTRAMUSCULAR; INTRAVENOUS
Status: DISCONTINUED | OUTPATIENT
Start: 2023-02-10 | End: 2023-02-10

## 2023-02-10 RX ORDER — OXYCODONE HYDROCHLORIDE 5 MG/1
5 TABLET ORAL EVERY 4 HOURS PRN
Qty: 30 TABLET | Refills: 0 | Status: SHIPPED | OUTPATIENT
Start: 2023-02-10 | End: 2023-02-15 | Stop reason: SDUPTHER

## 2023-02-10 RX ORDER — HALOPERIDOL 5 MG/ML
0.5 INJECTION INTRAMUSCULAR EVERY 10 MIN PRN
Status: DISCONTINUED | OUTPATIENT
Start: 2023-02-10 | End: 2023-02-10 | Stop reason: HOSPADM

## 2023-02-10 RX ORDER — HYDROMORPHONE HYDROCHLORIDE 2 MG/ML
INJECTION, SOLUTION INTRAMUSCULAR; INTRAVENOUS; SUBCUTANEOUS
Status: DISCONTINUED | OUTPATIENT
Start: 2023-02-10 | End: 2023-02-10

## 2023-02-10 RX ORDER — DEXAMETHASONE SODIUM PHOSPHATE 4 MG/ML
INJECTION, SOLUTION INTRA-ARTICULAR; INTRALESIONAL; INTRAMUSCULAR; INTRAVENOUS; SOFT TISSUE
Status: DISCONTINUED | OUTPATIENT
Start: 2023-02-10 | End: 2023-02-10

## 2023-02-10 RX ORDER — BISACODYL 10 MG
10 SUPPOSITORY, RECTAL RECTAL DAILY
Status: DISCONTINUED | OUTPATIENT
Start: 2023-02-10 | End: 2023-02-11 | Stop reason: HOSPADM

## 2023-02-10 RX ORDER — MORPHINE SULFATE 2 MG/ML
2 INJECTION, SOLUTION INTRAMUSCULAR; INTRAVENOUS
Status: DISCONTINUED | OUTPATIENT
Start: 2023-02-10 | End: 2023-02-11 | Stop reason: HOSPADM

## 2023-02-10 RX ORDER — CEFTRIAXONE 1 G/1
INJECTION, POWDER, FOR SOLUTION INTRAMUSCULAR; INTRAVENOUS
Status: DISCONTINUED | OUTPATIENT
Start: 2023-02-10 | End: 2023-02-10 | Stop reason: HOSPADM

## 2023-02-10 RX ORDER — MORPHINE SULFATE 2 MG/ML
1 INJECTION, SOLUTION INTRAMUSCULAR; INTRAVENOUS
Status: DISCONTINUED | OUTPATIENT
Start: 2023-02-10 | End: 2023-02-11 | Stop reason: HOSPADM

## 2023-02-10 RX ORDER — PROCHLORPERAZINE EDISYLATE 5 MG/ML
5 INJECTION INTRAMUSCULAR; INTRAVENOUS EVERY 6 HOURS PRN
Status: DISCONTINUED | OUTPATIENT
Start: 2023-02-10 | End: 2023-02-11 | Stop reason: HOSPADM

## 2023-02-10 RX ORDER — METHOCARBAMOL 500 MG/1
500 TABLET, FILM COATED ORAL 4 TIMES DAILY
Qty: 40 TABLET | Refills: 0 | Status: SHIPPED | OUTPATIENT
Start: 2023-02-10 | End: 2023-02-20

## 2023-02-10 RX ORDER — ONDANSETRON 8 MG/1
8 TABLET, ORALLY DISINTEGRATING ORAL EVERY 6 HOURS PRN
Status: DISCONTINUED | OUTPATIENT
Start: 2023-02-10 | End: 2023-02-11 | Stop reason: HOSPADM

## 2023-02-10 RX ORDER — BUPIVACAINE HYDROCHLORIDE 5 MG/ML
INJECTION, SOLUTION EPIDURAL; INTRACAUDAL
Status: DISCONTINUED | OUTPATIENT
Start: 2023-02-10 | End: 2023-02-10 | Stop reason: HOSPADM

## 2023-02-10 RX ORDER — OXYCODONE HYDROCHLORIDE 5 MG/1
5 TABLET ORAL EVERY 4 HOURS PRN
Status: DISCONTINUED | OUTPATIENT
Start: 2023-02-10 | End: 2023-02-11 | Stop reason: HOSPADM

## 2023-02-10 RX ORDER — METHYLPREDNISOLONE ACETATE 40 MG/ML
INJECTION, SUSPENSION INTRA-ARTICULAR; INTRALESIONAL; INTRAMUSCULAR; SOFT TISSUE
Status: DISCONTINUED | OUTPATIENT
Start: 2023-02-10 | End: 2023-02-10 | Stop reason: HOSPADM

## 2023-02-10 RX ORDER — MUPIROCIN 20 MG/G
OINTMENT TOPICAL 2 TIMES DAILY
Status: DISCONTINUED | OUTPATIENT
Start: 2023-02-10 | End: 2023-02-11 | Stop reason: HOSPADM

## 2023-02-10 RX ORDER — METHOCARBAMOL 500 MG/1
500 TABLET, FILM COATED ORAL 4 TIMES DAILY
Status: DISCONTINUED | OUTPATIENT
Start: 2023-02-10 | End: 2023-02-11 | Stop reason: HOSPADM

## 2023-02-10 RX ORDER — LIDOCAINE HYDROCHLORIDE AND EPINEPHRINE 10; 10 MG/ML; UG/ML
INJECTION, SOLUTION INFILTRATION; PERINEURAL
Status: DISCONTINUED | OUTPATIENT
Start: 2023-02-10 | End: 2023-02-10 | Stop reason: HOSPADM

## 2023-02-10 RX ORDER — MIDAZOLAM HYDROCHLORIDE 1 MG/ML
INJECTION, SOLUTION INTRAMUSCULAR; INTRAVENOUS
Status: DISCONTINUED | OUTPATIENT
Start: 2023-02-10 | End: 2023-02-10

## 2023-02-10 RX ORDER — GABAPENTIN 300 MG/1
300 CAPSULE ORAL 3 TIMES DAILY
Status: DISCONTINUED | OUTPATIENT
Start: 2023-02-10 | End: 2023-02-11 | Stop reason: HOSPADM

## 2023-02-10 RX ORDER — GABAPENTIN 300 MG/1
600 CAPSULE ORAL 3 TIMES DAILY
Qty: 180 CAPSULE | Refills: 11 | Status: SHIPPED | OUTPATIENT
Start: 2023-02-10 | End: 2024-02-26

## 2023-02-10 RX ORDER — PROPOFOL 10 MG/ML
VIAL (ML) INTRAVENOUS
Status: DISCONTINUED | OUTPATIENT
Start: 2023-02-10 | End: 2023-02-10

## 2023-02-10 RX ORDER — ACETAMINOPHEN 325 MG/1
650 TABLET ORAL EVERY 4 HOURS PRN
Status: DISCONTINUED | OUTPATIENT
Start: 2023-02-10 | End: 2023-02-11 | Stop reason: HOSPADM

## 2023-02-10 RX ORDER — ONDANSETRON 2 MG/ML
INJECTION INTRAMUSCULAR; INTRAVENOUS
Status: DISCONTINUED | OUTPATIENT
Start: 2023-02-10 | End: 2023-02-10

## 2023-02-10 RX ORDER — MAG HYDROX/ALUMINUM HYD/SIMETH 200-200-20
30 SUSPENSION, ORAL (FINAL DOSE FORM) ORAL EVERY 4 HOURS PRN
Status: DISCONTINUED | OUTPATIENT
Start: 2023-02-10 | End: 2023-02-11 | Stop reason: HOSPADM

## 2023-02-10 RX ORDER — FENTANYL CITRATE 50 UG/ML
25 INJECTION, SOLUTION INTRAMUSCULAR; INTRAVENOUS EVERY 5 MIN PRN
Status: DISCONTINUED | OUTPATIENT
Start: 2023-02-10 | End: 2023-02-10 | Stop reason: HOSPADM

## 2023-02-10 RX ADMIN — DEXMEDETOMIDINE HYDROCHLORIDE 8 MCG: 100 INJECTION, SOLUTION INTRAVENOUS at 11:02

## 2023-02-10 RX ADMIN — FENTANYL CITRATE 25 MCG: 50 INJECTION, SOLUTION INTRAMUSCULAR; INTRAVENOUS at 12:02

## 2023-02-10 RX ADMIN — MIDAZOLAM HYDROCHLORIDE 2 MG: 1 INJECTION, SOLUTION INTRAMUSCULAR; INTRAVENOUS at 08:02

## 2023-02-10 RX ADMIN — FENTANYL CITRATE 25 MCG: 50 INJECTION, SOLUTION INTRAMUSCULAR; INTRAVENOUS at 11:02

## 2023-02-10 RX ADMIN — GABAPENTIN 300 MG: 300 CAPSULE ORAL at 09:02

## 2023-02-10 RX ADMIN — HYDROMORPHONE HYDROCHLORIDE 0.5 MG: 2 INJECTION INTRAMUSCULAR; INTRAVENOUS; SUBCUTANEOUS at 11:02

## 2023-02-10 RX ADMIN — MUPIROCIN: 20 OINTMENT TOPICAL at 09:02

## 2023-02-10 RX ADMIN — ONDANSETRON 4 MG: 2 INJECTION INTRAMUSCULAR; INTRAVENOUS at 11:02

## 2023-02-10 RX ADMIN — Medication 25 MG: at 09:02

## 2023-02-10 RX ADMIN — OXYCODONE 5 MG: 5 TABLET ORAL at 12:02

## 2023-02-10 RX ADMIN — SUGAMMADEX 232 MG: 100 INJECTION, SOLUTION INTRAVENOUS at 11:02

## 2023-02-10 RX ADMIN — DEXMEDETOMIDINE HYDROCHLORIDE 4 MCG: 100 INJECTION, SOLUTION INTRAVENOUS at 11:02

## 2023-02-10 RX ADMIN — ACETAMINOPHEN 650 MG: 325 TABLET ORAL at 09:02

## 2023-02-10 RX ADMIN — CEFAZOLIN 2 G: 330 INJECTION, POWDER, FOR SOLUTION INTRAMUSCULAR; INTRAVENOUS at 09:02

## 2023-02-10 RX ADMIN — GABAPENTIN 300 MG: 300 CAPSULE ORAL at 04:02

## 2023-02-10 RX ADMIN — LIDOCAINE HYDROCHLORIDE 10 MG: 10 INJECTION, SOLUTION EPIDURAL; INFILTRATION; INTRACAUDAL; PERINEURAL at 07:02

## 2023-02-10 RX ADMIN — Medication 25 MG: at 10:02

## 2023-02-10 RX ADMIN — METHOCARBAMOL 500 MG: 500 TABLET ORAL at 12:02

## 2023-02-10 RX ADMIN — ROCURONIUM BROMIDE 50 MG: 10 INJECTION INTRAVENOUS at 08:02

## 2023-02-10 RX ADMIN — OXYCODONE 5 MG: 5 TABLET ORAL at 10:02

## 2023-02-10 RX ADMIN — FENTANYL CITRATE 25 MCG: 50 INJECTION, SOLUTION INTRAMUSCULAR; INTRAVENOUS at 10:02

## 2023-02-10 RX ADMIN — PROPOFOL 150 MG: 10 INJECTION, EMULSION INTRAVENOUS at 08:02

## 2023-02-10 RX ADMIN — HALOPERIDOL LACTATE 1 MG: 5 INJECTION, SOLUTION INTRAMUSCULAR at 11:02

## 2023-02-10 RX ADMIN — OXYCODONE 5 MG: 5 TABLET ORAL at 04:02

## 2023-02-10 RX ADMIN — METHOCARBAMOL 500 MG: 500 TABLET ORAL at 09:02

## 2023-02-10 RX ADMIN — FENTANYL CITRATE 25 MCG: 50 INJECTION, SOLUTION INTRAMUSCULAR; INTRAVENOUS at 04:02

## 2023-02-10 RX ADMIN — METHOCARBAMOL 500 MG: 500 TABLET ORAL at 04:02

## 2023-02-10 RX ADMIN — SODIUM CHLORIDE: 0.9 INJECTION, SOLUTION INTRAVENOUS at 08:02

## 2023-02-10 RX ADMIN — GABAPENTIN 300 MG: 300 CAPSULE ORAL at 12:02

## 2023-02-10 RX ADMIN — DEXAMETHASONE SODIUM PHOSPHATE 8 MG: 4 INJECTION, SOLUTION INTRAMUSCULAR; INTRAVENOUS at 09:02

## 2023-02-10 RX ADMIN — LIDOCAINE HYDROCHLORIDE 50 MG: 10 INJECTION, SOLUTION INTRAVENOUS at 08:02

## 2023-02-10 RX ADMIN — ACETAMINOPHEN 1000 MG: 500 TABLET ORAL at 07:02

## 2023-02-10 RX ADMIN — FENTANYL CITRATE 100 MCG: 50 INJECTION, SOLUTION INTRAMUSCULAR; INTRAVENOUS at 08:02

## 2023-02-10 NOTE — BRIEF OP NOTE
[facility] - Surgery ([facility])  Brief Operative Note    SUMMARY     Surgery Date: 2/10/2023     Surgeon(s) and Role:     * Lorie Juarez MD - Primary     * Marcin Das MD - Resident - Assisting        Pre-op Diagnosis:  Lumbar radiculopathy, chronic [M54.16]  Lumbar disc herniation [M51.26]    Post-op Diagnosis:  Post-Op Diagnosis Codes:     * Lumbar radiculopathy, chronic [M54.16]     * Lumbar disc herniation [M51.26]    Procedure(s) (LRB):  LEFT L4-L5 MICRODISCECTOMY, SPINE (N/A)    Anesthesia: General    Operative Findings: left L4-5 microdiscectomy    Estimated Blood Loss: * No values recorded between 2/10/2023  9:28 AM and 2/10/2023 11:51 AM *    Estimated Blood Loss has been documented.         Specimens:   Specimen (24h ago, onward)      None            [id number]

## 2023-02-10 NOTE — TRANSFER OF CARE
Anesthesia Transfer of Care Note    Patient: Dexter Gupta    Procedure(s) Performed: Procedure(s) (LRB):  LEFT L4-L5 MICRODISCECTOMY, SPINE (N/A)    Patient location: PACU    Anesthesia Type: general    Transport from OR: Transported from OR on 6-10 L/min O2 by face mask with adequate spontaneous ventilation    Post pain: adequate analgesia    Post assessment: no apparent anesthetic complications and tolerated procedure well    Post vital signs: stable    Level of consciousness: awake    Nausea/Vomiting: no nausea/vomiting    Complications: none    Transfer of care protocol was followed      Last vitals:   Visit Vitals  /61 (BP Location: Left arm, Patient Position: Lying)   Pulse 109   Temp 36.9 °C (98.4 °F) (Temporal)   Resp 19   Wt 116.1 kg (256 lb)   LMP 01/15/2023 (Approximate)   SpO2 98%   Breastfeeding No   BMI 45.35 kg/m²

## 2023-02-10 NOTE — NURSING TRANSFER
Nursing Transfer Note      2/10/2023     Reason patient is being transferred: meets criteria     Transfer To: NPU    Transfer via stretcher    Transfer with IV    Transported by PCT    Medicines sent: none    Any special needs or follow-up needed: none    Chart send with patient: Yes    Notified: mother    Patient reassessed at: [id number]

## 2023-02-10 NOTE — ANESTHESIA PROCEDURE NOTES
Intubation    Date/Time: 2/10/2023 8:44 AM  Performed by: Vince Munson CRNA  Authorized by: Madeline Barragan MD     Intubation:     Induction:  Intravenous    Intubated:  Postinduction    Mask Ventilation:  Easy mask    Attempts:  1    Attempted By:  Student    Method of Intubation:  Video laryngoscopy    Blade:  Fierro 3    Laryngeal View Grade: Grade I - full view of cords      Difficult Airway Encountered?: No      Complications:  None    Airway Device:  Oral endotracheal tube    Airway Device Size:  7.0    Style/Cuff Inflation:  Cuffed (inflated to minimal occlusive pressure)    Inflation Amount (mL):  8    Tube secured:  23    Secured at:  The teeth    Placement Verified By:  Capnometry    Complicating Factors:  None    Findings Post-Intubation:  BS equal bilateral and atraumatic/condition of teeth unchanged

## 2023-02-10 NOTE — OP NOTE
DATE OF SURGERY: 02/10/2023     PREOPERATIVE DIAGNOSIS:  1. Herniated intervertebral disc, L4-5  2. Left L5 radiculopathy    POSTOPERATIVE DIAGNOSIS:      PROCEDURE PERFORMED:  1. Left L4-5 hemilaminectomy and microdiscectomy from MIS approach  2. Use of intraoperative microscope  3. Use of intraoperative flouroscopy  4. Use of neuromonitoring with free run EMG    [facility]    ASSISTANT: Marcin Das MD - resident    ANESTHESIA: GETA    ESTIMATED BLOOD LOSS: Minimal    COMPLICATIONS: None    DRAINS: None    SPECIMENS SENT: None    INDICATIONS:    Dexter Gupta is a 23 yo female with lower back pain and distal left leg pain and large left paracentral disc herniation at L4-5. Symptoms are persistent after trial of conservative management to include weight loss, PT, medication (NSAID, muscle relaxant & gabapentin), and SADI.  She has progressive symptoms with worsening pain and sensory changes involving the left leg now with left foot drop.      I discussed lumbar L4-5 MIS discectomy in detail with the patient included the expected post operative course & recovery, risks, benefits, alternatives, indications and methods were reviewed in detail and the patient wished to proceed. All questions were answered. No guarantees about the results of the procedure were made.  Informed consent was completed in clinic.  This morning, she denies any interval improvement or new symptoms and would like to proceed as planned.    PROCEDURE:    The patient was brought to the operating room where she was intubated and placed under general anesthesia without difficulty.  All lines were placed. She was placed prone onto a Lenny frame with appropriate padding of all pressure points.  AP and lateral x-ray were used to localize to the lumbar 4-5 disc space and to plan a left paramedian incision. The skin was marked and the area was prepped and draped in the usual sterile fashion. A timeout was performed prior to  the procedure. Ten mL of Lidocaine with Epinephrine was injected into the skin.     A 2 cm linear left paramedian incision was made with a 10 blade.  Bovie electric cautery was used to open the fascia. Sequential dilators were docked onto the left L4 hemilamina.  A tubular retractor was placed and docked onto the operative table.  The microscope was brought into the field for microdissection.  Soft tissues were cleared with Bovie electrocautery to expose the L4 hemilamina and the medial L4-5 facet.  A hemilaminectomy was then performed with a combination of the high-speed drill and Kerrison punches.  The ligamentum flavum was removed with curettes and Kerrison punches to expose the thecal sac and traversing L5 nerve root.  The thecal sac was retracted medially with a nerve root retractor and epidural veins were cauterized and divided.  The lumbar 4-5 disc space then came into view and an annulotomy was performed with a 15 blade.  Disc material was removed with pituitary rongeurs.  The plane below the posterior longitudinal ligament was then explored with a down-going curette and a Brea probe to find the sequestered disc fragment.  The disc fragment was identified and mobilized into the lumbar 4-5 disc space and removed with a pituitary rongeur.  The shoulder of the L5 nerve root was then explored with a Brea probe and found to be adequately decompressed.      The wound was copiously irrigated with sterile normal saline and Depo-Medrol was placed over the thecal sac.  The microscope was taken out of the field.  Hemostasis was achieved with bipolar electrocautery.  The fascia was closed in a watertight fashion with a running UR6 needle.  The soft tissues were closed in layers.  A 4-0 Monocryl subcuticular stitch was placed.  Dermabond was placed at the skin.    The patient appeared to tolerate the procedure well from a hemodynamic and neuro monitoring standpoint.  I was present for all critical portions of the  case.  At the end of the case all counts are correct.  The patient was repositioned supine onto the hospital bed where she was extubated and allowed to emerge from anesthesia without difficulty.

## 2023-02-10 NOTE — NURSING
Nurses Note -- 4 Eyes      2/10/2023   5:52 PM      Skin assessed during: transfer      [x] No Pressure Injuries Present    []Prevention Measures Documented      [x] Yes- Altered Skin Integrity Present or Discovered   [x] LDA Added if Not in Epic (Describe Wound)   [] New Altered Skin Integrity was Present on Admit and Documented in LDA   [] Wound Image Taken    Incision on lower spine.     Wound Care Consulted? No    Attending Nurse:  Yajaira Galvez RN     Second RN/Staff Member:  Carmela Palomo RN

## 2023-02-10 NOTE — ANESTHESIA POSTPROCEDURE EVALUATION
Anesthesia Post Evaluation    Patient: Dexter Gupta    Procedure(s) Performed: Procedure(s) (LRB):  LEFT L4-L5 MICRODISCECTOMY, SPINE (N/A)    Final Anesthesia Type: general      Patient location during evaluation: PACU  Patient participation: Yes- Able to Participate  Level of consciousness: awake and alert  Post-procedure vital signs: reviewed and stable  Pain management: adequate  Airway patency: patent    PONV status at discharge: No PONV  Anesthetic complications: no      Cardiovascular status: hemodynamically stable  Respiratory status: spontaneous ventilation  Follow-up not needed.          Vitals Value Taken Time   /59 02/10/23 1217   Temp 36.9 °C (98.4 °F) 02/10/23 1206   Pulse 94 02/10/23 1223   Resp 17 02/10/23 1223   SpO2 94 % 02/10/23 1223   Vitals shown include unvalidated device data.      No case tracking events are documented in the log.      Pain/Vivi Score: Pain Rating Prior to Med Admin: 6 (2/10/2023  7:20 AM)  Vivi Score: 8 (2/10/2023 12:06 PM)

## 2023-02-11 VITALS
OXYGEN SATURATION: 98 % | WEIGHT: 256 LBS | SYSTOLIC BLOOD PRESSURE: 119 MMHG | TEMPERATURE: 98 F | BODY MASS INDEX: 45.35 KG/M2 | DIASTOLIC BLOOD PRESSURE: 70 MMHG | HEART RATE: 106 BPM | RESPIRATION RATE: 18 BRPM

## 2023-02-11 PROCEDURE — 25000003 PHARM REV CODE 250: Performed by: STUDENT IN AN ORGANIZED HEALTH CARE EDUCATION/TRAINING PROGRAM

## 2023-02-11 PROCEDURE — 97530 THERAPEUTIC ACTIVITIES: CPT

## 2023-02-11 PROCEDURE — 97165 OT EVAL LOW COMPLEX 30 MIN: CPT

## 2023-02-11 PROCEDURE — 97161 PT EVAL LOW COMPLEX 20 MIN: CPT

## 2023-02-11 RX ADMIN — MUPIROCIN: 20 OINTMENT TOPICAL at 08:02

## 2023-02-11 RX ADMIN — OXYCODONE 5 MG: 5 TABLET ORAL at 03:02

## 2023-02-11 RX ADMIN — METHOCARBAMOL 500 MG: 500 TABLET ORAL at 08:02

## 2023-02-11 RX ADMIN — OXYCODONE 5 MG: 5 TABLET ORAL at 06:02

## 2023-02-11 RX ADMIN — GABAPENTIN 300 MG: 300 CAPSULE ORAL at 08:02
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
## 2023-02-11 NOTE — PT/OT/SLP EVAL
"Physical Therapy Evaluation and Discharge Note    Patient Name:  Dexter Gupta   MRN:  [id number]    Recommendations:     Discharge Recommendations: Home with outpatient PT  Discharge Equipment Recommendations: none   Barriers to discharge: None    Assessment:     Dexter Gupta is a 22 y.o. female admitted with a medical diagnosis of Lumbar disc herniation. .  At this time, patient is functioning at their prior level of function and does not require further acute PT services.     Recent Surgery: Procedure(s) (LRB):  LEFT L4-L5 MICRODISCECTOMY, SPINE (N/A) 1 Day Post-Op    Plan:     During this hospitalization, patient does not require further acute PT services.  Please re-consult if situation changes.      Subjective     Chief Complaint: "When I first stand up my back hurts, but the pain is much better than before"  Patient/Family Comments/goals: return to PLOF  Pain/Comfort:  Pain Rating 1: 4/10  Location - Orientation 1: lower  Location 1: back  Pain Addressed 1: Reposition, Distraction  Pain Rating Post-Intervention 1: 4/10    Patients cultural, spiritual, Taoism conflicts given the current situation: no    Living Environment:  The patient plans to stay with her mom and vish [facility], [address]. Works as a teacher, drives. R handed.   Prior to admission, patients level of function was independent.  Equipment used at home: none.  DME owned (not currently used): none.  Upon discharge, patient will have assistance from family.    Objective:     Communicated with RN prior to session.  Patient found HOB elevated with bed alarm, peripheral IV upon PT entry to room. Mom at bedside.    General Precautions: Standard, fall    Orthopedic Precautions:spinal precautions   Braces: N/A  Respiratory Status: Room air    Exams:4    Cognitive Exam  Patient is A&O x4 and follows 100% of one -step commands    Fine Motor Coordination   -       WNL     Postural Exam Patient presented with the following abnormalities:    -       Rounded " shoulders  -       Forward head  -     Sensation    -       Light touch intact YEISON LE, occasional tingling L great toe   Skin Integrity/Edema     -       Skin integrity: visibly intact  -       Edema: NA   R LE ROM WNL   R LE Strength 4+/5 hip flexion, knee ext/flex, and ankle DF/PF   L LE ROM WNL   L LE Strength  4-/5 hip flexion, knee ext/flex, and ankle DF/PF       Functional Mobility:    Bed Mobility  Supine to Sit on the R side:  modified independent, log roll independently, HOB slightly raised   Transfers Sit to Stand:  modified independent    Gait  Gait Distance: 400 ft with no AD  Assistance Level: modified independent   Description: slight decreased gait speed, reciprocal stride, mild trendelenburg gait R hip drop in L stance         AM-PAC 6 CLICK MOBILITY  Total Score:24       Treatment and Education:  Patient educated on role of therapy, goals of session, benefits of out of bed mobility. Patient agreeable to mobilize with therapy.  Discussed PT plan of care during hospitalization. Patient educated that they need to call for assistance to mobilize out of bed. Whiteboard updated as appropriate. Patient educated on how their diagnosis impacts their mobility within PT scope of practice.     Educated on spinal precautions, log roll technique- patient verbalized and demo'd good understanding of precautions.     Patient educated on PT schedule.  Encouraged patient to ambulate, sit up in chair 3x/day to prevent deconditioning during hospitalization. Patient verbalized understanding and agreement not to mobilize without RN assist. Patient in agreement with PT POC, Home with OP PT.      Patient safe to ambulate with RN supervision.     AM-PAC 6 CLICK MOBILITY  Total Score:24     Patient left up in chair with all lines intact, call button in reach, and mom present.    GOALS:   Multidisciplinary Problems       Physical Therapy Goals       Not on file              Multidisciplinary Problems (Resolved)           Problem: Physical Therapy    Goal Priority Disciplines Outcome Goal Variances Interventions   Physical Therapy Goal   (Resolved)     PT, PT/OT Met     Description: The patient is at their functional baseline, they are safe to return home with therapy needs. Discharge acute PT, patient in agreement and aware to request therapy re-consult should they experience a decline in functional mobility. Encouraged patient to continue ambulating daily with RN assist to prevent functional decline.     Patient is safe to ambulate with supervision assist from RN.                          History:     Past Medical History:   Diagnosis Date    Anxiety     Asthma     Other spondylosis with radiculopathy, lumbosacral region 10/17/2022       Past Surgical History:   Procedure Laterality Date    TONSILLECTOMY      TYMPANOSTOMY TUBE PLACEMENT Bilateral     3 separates operations; 1998, 1999, 2001       Time Tracking:     PT Received On: 02/11/23  PT Start Time: 0920     PT Stop Time: 0935  PT Total Time (min): 15 min     Billable Minutes: Evaluation 7 and Therapeutic Activity 8      02/11/2023

## 2023-02-11 NOTE — PLAN OF CARE
Problem: Adult Inpatient Plan of Care  Goal: Plan of Care Review  Outcome: Ongoing, Progressing  Goal: Patient-Specific Goal (Individualized)  Outcome: Ongoing, Progressing  Goal: Absence of Hospital-Acquired Illness or Injury  Outcome: Ongoing, Progressing  Goal: Optimal Comfort and Wellbeing  Outcome: Ongoing, Progressing  Goal: Readiness for Transition of Care  Outcome: Ongoing, Progressing     Problem: Bariatric Environmental Safety  Goal: Safety Maintained with Care  Outcome: Ongoing, Progressing    POC reviewed and updated with the patient and mother at the bedside. Questions regarding POC were encouraged and addressed. VSS, see flowsheets. Patient is AOx 4 at this time. Fall and safety precautions maintained, no signs of injury noted during shift. Pain management utilizing PRN pain medications effective, see MAR for administration details. Patient repositioned self for comfort with bed locked in low position, side rails up x 3, bed alarm set, with call light within reach. Instructed patient to call staff for mobility, verbalized understanding. No acute signs of distress noted at this time.

## 2023-02-11 NOTE — PATIENT INSTRUCTIONS
--Patient stable for discharge to home    --Please take prescriptions as detailed in medication list    --All questions/concerns addressed and answered    --Please followup with neurosurgery clinic in 2 weeks for wound check; to be arranged by Neurosurgery Clinic     --Please call immediately for any new onset nausea/vomiting/fever/chills, wound breakdown, numbness/tingling/weakness    Wound Care Instructions:  -If you have any dressings at discharge, please remove 48 hours after the surgery.  -If you have steri strips, do not remove, as they will fall off.  -If you have staples, do not remove, as they will be removed at clinic follow up.  -You may shower daily but do not soak or submerge wound in water. Pat incision dry, do not rub.  -Keep all incisions clean, dry, and open to air.  -Do not apply creams or ointments to the wound.  -No driving while on narcotic pain medications  -If you were given a brace for spine surgery, please remove to shower, may remove while in bed, no driving, and must be worn for 6 weeks when out of bed.  -Call Neurosurgery if the wound opens, drains, or becomes red.

## 2023-02-11 NOTE — PLAN OF CARE
Problem: Physical Therapy  Goal: Physical Therapy Goal  Description: The patient is at their functional baseline, they are safe to return home with therapy needs. Discharge acute PT, patient in agreement and aware to request therapy re-consult should they experience a decline in functional mobility. Encouraged patient to continue ambulating daily with RN assist to prevent functional decline.     Patient is safe to ambulate with supervision assist from RN.     Outcome: Met   Mary Walker, PT  2/11/2023

## 2023-02-11 NOTE — DISCHARGE SUMMARY
[facility] - Neurosurgery ([facility])  Neurosurgery  Discharge Summary      Patient Name: Dexter Gupta  MRN: [id number]  Admission Date: 2/10/2023  Hospital Length of Stay: 0 days  Discharge Date and Time:  02/11/2023 8:58 AM  Attending Physician: Lorie Juarez MD   Discharging Provider: Garfield Freeman MD  Primary Care Provider: Primary Doctor No    HPI:   Pt presents with lumbar disc herniation    Procedure(s) (LRB):  LEFT L4-L5 MICRODISCECTOMY, SPINE (N/A)     Hospital Course: Pt admitted 2/10 for mis lumbar laminectomy decompression. Tolerated procedure well, monitored oevenight and progressed towards all goals. PT cleared for home, stable for dischrge      Goals of Care Treatment Preferences:         Consults:     Significant Diagnostic Studies: Labs: All labs within the past 24 hours have been reviewed    Pending Diagnostic Studies:     None        Final Active Diagnoses:    Diagnosis Date Noted POA    PRINCIPAL PROBLEM:  Lumbar disc herniation [M51.26] 02/10/2023 Unknown      Problems Resolved During this Admission:      Discharged Condition: good     Disposition: Home or Self Care    Follow Up:    Patient Instructions:   No discharge procedures on file.  Medications:  Reconciled Home Medications:      Medication List      START taking these medications    methocarbamoL 500 MG Tab  Commonly known as: ROBAXIN  Take 1 tablet (500 mg total) by mouth 4 (four) times daily. for 10 days     oxyCODONE 5 MG immediate release tablet  Commonly known as: ROXICODONE  Take 1 tablet (5 mg total) by mouth every 4 (four) hours as needed (pain 4-5/10).        CONTINUE taking these medications    acetaminophen 500 MG tablet  Commonly known as: TYLENOL  Take 1,000 mg by mouth every 8 (eight) hours as needed for Pain.     gabapentin 300 MG capsule  Commonly known as: NEURONTIN  Take 2 capsules (600 mg total) by mouth 3 (three) times daily.     ibuprofen 200 MG tablet  Commonly known as: ADVIL,MOTRIN  Take 200 mg by mouth every 6  (six) hours as needed for Pain.     medroxyPROGESTERone 10 MG tablet  Commonly known as: PROVERA  Take 1 tablet (10 mg total) by mouth once daily.     naproxen 250 MG tablet  Commonly known as: NAPROSYN  Take 250 mg by mouth 2 (two) times daily.     propranoloL 80 MG 24 hr capsule  Commonly known as: INDERAL LA  Take 80 mg by mouth once daily.        STOP taking these medications    tiZANidine 2 mg Cap            Garfield Freeman MD  Neurosurgery  [facility] - Neurosurgery ([facility])

## 2023-02-11 NOTE — ASSESSMENT & PLAN NOTE
POD1 s/p L4-L5 mis ciscectomy    Stable for discharge  Instructions in chart  Prescriptions in chart  Follow up in 2 weeks scheduled  All questions answered

## 2023-02-11 NOTE — HOSPITAL COURSE
Pt admitted 2/10 for mis lumbar laminectomy decompression. Tolerated procedure well, monitored oevenight and progressed towards all goals. PT cleared for home, stable for dischrge

## 2023-02-11 NOTE — PLAN OF CARE
Discharge from OT services on acute  Problem: Occupational Therapy  Goal: Occupational Therapy Goal  Description: Goals not set 2* no further OT needed.  Outcome: Met

## 2023-02-12 ENCOUNTER — PATIENT MESSAGE (OUTPATIENT)
Dept: NEUROSURGERY | Facility: CLINIC | Age: 23
End: 2023-02-12
Payer: MEDICAID

## 2023-02-15 ENCOUNTER — PATIENT MESSAGE (OUTPATIENT)
Dept: NEUROSURGERY | Facility: CLINIC | Age: 23
End: 2023-02-15
Payer: MEDICAID

## 2023-02-15 RX ORDER — OXYCODONE HYDROCHLORIDE 5 MG/1
10 TABLET ORAL EVERY 6 HOURS PRN
Qty: 30 TABLET | Refills: 0 | Status: SHIPPED | OUTPATIENT
Start: 2023-02-15 | End: 2023-02-15

## 2023-02-15 RX ORDER — OXYCODONE HYDROCHLORIDE 5 MG/1
10 TABLET ORAL EVERY 6 HOURS PRN
Qty: 30 TABLET | Refills: 0 | Status: SHIPPED | OUTPATIENT
Start: 2023-02-15 | End: 2023-04-04

## 2023-02-23 ENCOUNTER — CLINICAL SUPPORT (OUTPATIENT)
Dept: NEUROSURGERY | Facility: CLINIC | Age: 23
End: 2023-02-23
Payer: MEDICAID

## 2023-02-23 DIAGNOSIS — M51.16 LUMBAR DISC HERNIATION WITH RADICULOPATHY: ICD-10-CM

## 2023-02-23 DIAGNOSIS — M54.16 LUMBAR RADICULOPATHY, CHRONIC: Primary | ICD-10-CM

## 2023-02-23 NOTE — PROGRESS NOTES
Patient seen in clinic for 2 week post op s/p Left L4-5 microdiscectomy with Dr Juarez 02/10/2023              Incision on lumbar spine assessed, dermabond used for closure and still intact, no swelling or drainage, edges well approximated.     Patient was instructed as follows:   Discontinue Bacitracin after tonight.  May shower normally but pat dry after shower.  Do not submerge wound in bath tub or go swimming until released by the physician  Keep incision clean, dry and open to air as much as possible.  Patient encouraged to walk as much as possible but advised to walk with family member or friend and rest as necessary.  No lifting >10lbs.  Avoid bending and twisting the area of your surgery more than 45 degrees from neutral position in any direction.  Advised let the glue fall off on its own.     A copy of post-operative instructions provided to the patient.    All questions were answered. Patient will follow up with Dr Juarez 04/04/2023 Patient was encouraged to call clinic with any future concerns prior to follow up appt. If any worsening symptoms, patient should report to ED.       Lakesha Waite RN, BSN  Neurosurgery

## 2023-03-10 ENCOUNTER — PATIENT MESSAGE (OUTPATIENT)
Dept: ADMINISTRATIVE | Facility: OTHER | Age: 23
End: 2023-03-10
Payer: MEDICAID

## 2023-03-24 ENCOUNTER — CLINICAL SUPPORT (OUTPATIENT)
Dept: REHABILITATION | Facility: HOSPITAL | Age: 23
End: 2023-03-24
Payer: MEDICAID

## 2023-03-24 DIAGNOSIS — Z74.09 IMPAIRED MOBILITY: ICD-10-CM

## 2023-03-24 DIAGNOSIS — M54.42 CHRONIC LEFT-SIDED LOW BACK PAIN WITH LEFT-SIDED SCIATICA: Primary | ICD-10-CM

## 2023-03-24 DIAGNOSIS — G89.29 CHRONIC LEFT-SIDED LOW BACK PAIN WITH LEFT-SIDED SCIATICA: Primary | ICD-10-CM

## 2023-03-24 DIAGNOSIS — R53.1 DECREASED STRENGTH: ICD-10-CM

## 2023-03-24 PROCEDURE — 97162 PT EVAL MOD COMPLEX 30 MIN: CPT | Mod: PN

## 2023-03-24 PROCEDURE — 97110 THERAPEUTIC EXERCISES: CPT | Mod: PN

## 2023-03-24 PROCEDURE — 97112 NEUROMUSCULAR REEDUCATION: CPT | Mod: PN

## 2023-03-24 NOTE — PLAN OF CARE
[facility]   Physical Therapy Initial Evaluation       Name: Dexter Gupta  Clinic Number: [id number]    Therapy Diagnosis:   Encounter Diagnoses   Name Primary?    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-sided sciatica Yes    Decreased strength     Impaired mobility         Physician: Christina Conklin, [place]    Physician Orders: PT Eval and Treat   Medical Diagnosis from Referral: Lumbar radiculopathy, chronic [M54.16], Lumbar disc herniation with radiculopathy [M51.16]  Evaluation Date: 3/24/2023  Authorization Period Expiration: 2/23/2024  Plan of Care Expiration: 5/5/2023  Progress Note Due: 4/24/2023  Visit # / Visits authorized: 1/1   FOTO: 1/5    Precautions: Standard     Time In: 0813  Time Out: 0904  Total Appointment Time (timed & untimed codes): 51 minutes    SUBJECTIVE     Date of onset: 2/10/2023    History of current condition - Dexter reports: surgery (Left L4-5 hemilaminectomy and microdiscectomy from MIS approach) performed to back 6 weeks ago. Prior to surgery, patient experienced pain in left low back and lower extremity most noteable at mid-length of lateral calf. Pain occurs in the same areas but is noticeably less since surgery. Patient also experienced numbness and tingling in left lower leg and foot and issues with voiding bladder. Patient reports bladder issues have resolved and the tingling only occurs on the dorsum of her left foot     Falls: None    Imaging: MRI Lumbar Spine 1/2023: 1. Degenerative changes of the lower lumbar spine detailed above. Large disc extrusion at L4-L5 results in severe spinal canal stenosis.     Prior Therapy: In the hospital  Social History:  Patient reports her hobbies are painting, reading and playing video games. Patient stopped playing video games when her pain was high  Occupation: Patient is a  at [employer]. Patient reports her work situation is lenient vs a strict return date  Prior Level of Function:  Modified independent with cane, limited walking distances.  Current Level of Function: Independent. Pain in left low back and lateral calf with lots of activity. Mild tingling and numbness in dorsum of left foot    Pain:  Current 1/10, worst 5/10, best <1/10   Location: Left low back  Description: Ache  Aggravating Factors: Prolonged sitting  Easing Factors: Walking breaks, supine/sidelying    Patients goals: Eliminate pain from returning, lift and interact with kids at work without issues     Medical History:   Past Medical History:   Diagnosis Date    Anxiety     Asthma     Other spondylosis with radiculopathy, lumbosacral region 10/17/2022     Surgical History:   Dexter Gupta  has a past surgical history that includes Tonsillectomy; Tympanostomy tube placement (Bilateral); and Microdiscectomy of spine (N/A, 2/10/2023).    Medications:   Dexter has a current medication list which includes the following prescription(s): acetaminophen, gabapentin, ibuprofen, medroxyprogesterone, naproxen, oxycodone, and propranolol.    Allergies:   Review of patient's allergies indicates:  No Known Allergies     OBJECTIVE     Palpation: No tenderness to palpation of bilateral low back and buttock  Posture: Decreased lumbar lordosis    Hip Right  Left  Pain/dysfunction with movement    Active range of motion  Passive range of motion  Active range of motion  Passive range of motion     Flexion 115 125 105 115    Extension WNL NT WNL NT    Abduction WNL NT WNL NT    Adduction WNL NT WNL NT    Internal rotation WNL NT 30 35    External rotation WNL NT WNL NT    Within normal limits=within normal limits  Not tested=NT    Knee active range of motion: within normal limits bilateral      Lower extremity myotomes Right Left Pain/dysfunction with movement   Hip flexion 5/5 4+/5    Hip extension 4/5! 4-/5! Pain in left low back, greater with left manual muscle test   Knee flexion 5/5 5/5    Knee extension 5/5 4+/5    Ankle dorsiflexion 5/5  "4+/5    Ankle plantarflexon 5/5 5/5    Ankle eversion 5/5 4+/5    Big toe extension 4+/5 4/5      Gross movement:   Supine alternate march: Low grade pain with hip flexion to 90 degrees; reduced with shorter arc of hip flexion    Flexibility: 9090 hamstring: -20 degrees right, -28 degrees left     Special tests: Straight leg raise: (-) bilateral     Other: Hip abduction manual muscle test: 4/5 bilateral     Limitation/Restriction for FOTO Lumbar Spine Survey    Therapist reviewed FOTO scores for Dexter Gupta on 3/24/2023.   FOTO documents entered into [employer] - see Media section.    Limitation Score: 40%     TREATMENT     Total Treatment time (time-based codes) separate from Evaluation: 15 minutes      Dexter received the treatments listed below:      therapeutic exercises to develop strength and endurance for 15 minutes including:    Hooklying:  Double leg bridge with hip abduction: blue theraband 2x10  Posterior pelvic tilt with transverse abdominus emphasis: 5"x10  Transverse abdominus activation with alternate march: x10 bilateral. Verbal cues to perform in short arc (pain with hip flexion past 90 degrees)  Multifidi isometric (shoulder extension onto vertically oriented dumbbells): 5"x10    PATIENT EDUCATION AND HOME EXERCISES     Education provided:   - home exercise program   - prognosis and plan of care     Written Home Exercises Provided: yes. Exercises were reviewed and Dxeter was able to demonstrate them prior to the end of the session.  Dexter demonstrated good  understanding of the education provided. See EMR under Patient Instructions for exercises provided during therapy sessions.    ASSESSMENT     Dexter is a 22 y.o. female referred to outpatient Physical Therapy with a medical diagnosis of chronic lumbar radiculopathy and lumbar disc herniation. Patient presents 6 weeks post op left L4-5 hemilaminectomy and microdiscectomy with decreased pain from low back to left lateral mid-calf and tingling in " dorsum of left foot. Focused on foundational lumbo pelvic strengthening with good awareness of intended muscle activation. 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    Patient prognosis is Good.   Patient will benefit from skilled outpatient Physical Therapy to address the deficits stated above and in the chart below, provide patient /family education, and to maximize patientt's level of independence.     Plan of care discussed with patient: Yes  Patient's spiritual, cultural and educational needs considered and patient is agreeable to the plan of care and goals as stated below:     Anticipated Barriers for therapy: Surgery    Medical Necessity is demonstrated by the following  History  Co-morbidities and personal factors that may impact the plan of care Co-morbidities: Anxiety or Panic Disorders, Back pain, BMI over 30, Depression, Headaches, Prior  Surgery    Personal Factors:   lifestyle-exercise seldom or never  Coping style     high   Examination  Body Structures and Functions, activity limitations and participation restrictions that may impact the plan of care Body Regions:   back  lower extremities    Body Systems:    ROM  strength  gait  transfers  transitions  motor control    Participation Restrictions:   Work    Activity limitations:   Learning and applying knowledge  no deficits    General Tasks and Commands  no deficits    Communication  no deficits    Mobility  lifting and carrying objects  walking    Self care  no deficits    Domestic Life  doing house work (cleaning house, washing dishes, laundry)  assisting others    Interactions/Relationships  family relationships    Life Areas  employment    Community and Social Life  community life  recreation and leisure         high   Clinical Presentation evolving clinical presentation with changing clinical characteristics moderate   Decision Making/ Complexity Score: moderate     Short Term Goals (3 Weeks):  1.  Patient will be compliant with home exercise program to supplement therapy in promoting functional mobility.  2. Patient will perform dead bug with good control to demonstrate improved core strength.  3. Patient will initiate lifting and carrying of </=10 lb weight without pain to promote return to work.   Long Term Goals (6 Weeks):   1. Patient will improve FOTO score to </= 38% limited to decrease perceived limitation with maintaining/changing body position.   2. Patient will perform plank with good control to demonstrate improved core strength.  3. Patient will improve impaired lower extremity myotomes >/=4+/5 to improve strength for functional tasks.  4. Patient will perform 30 lb weighted deadlift without pain or deficits to promote bending and lifting at work.    PLAN     Plan of care Certification: 3/24/2023 to 5/5/2023.    Outpatient Physical Therapy 2 times weekly for 6 weeks to include the following interventions: Electrical Stimulation -, Gait Training, Manual Therapy, Moist Heat/ Ice, Neuromuscular Re-ed, Patient Education, Self Care, Therapeutic Activities, and Therapeutic Exercise.     Gifty Raymundo, PT      I CERTIFY THE NEED FOR THESE SERVICES FURNISHED UNDER THIS PLAN OF TREATMENT AND WHILE UNDER MY CARE   Physician's comments:     Physician's Signature: ___________________________________________________

## 2023-03-30 ENCOUNTER — CLINICAL SUPPORT (OUTPATIENT)
Dept: REHABILITATION | Facility: HOSPITAL | Age: 23
End: 2023-03-30
Payer: MEDICAID

## 2023-03-30 DIAGNOSIS — M54.42 CHRONIC LEFT-SIDED LOW BACK PAIN WITH LEFT-SIDED SCIATICA: Primary | ICD-10-CM

## 2023-03-30 DIAGNOSIS — R53.1 DECREASED STRENGTH: ICD-10-CM

## 2023-03-30 DIAGNOSIS — G89.29 CHRONIC LEFT-SIDED LOW BACK PAIN WITH LEFT-SIDED SCIATICA: Primary | ICD-10-CM

## 2023-03-30 DIAGNOSIS — Z74.09 IMPAIRED MOBILITY: ICD-10-CM

## 2023-03-30 PROCEDURE — 97110 THERAPEUTIC EXERCISES: CPT | Mod: PN

## 2023-03-30 NOTE — PROGRESS NOTES
"[facility]   Physical Therapy Treatment Note     Name: Dexter Gupta  Clinic Number: [id number]    Therapy Diagnosis:   Encounter Diagnoses   Name Primary?    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-sided sciatica Yes    Decreased strength     Impaired mobility      Physician: Christina Conklin PA-C    Visit Date: 3/30/2023    Physician Orders: PT Eval and Treat   Medical Diagnosis from Referral: Lumbar radiculopathy, chronic [M54.16], Lumbar disc herniation with radiculopathy [M51.16]  Evaluation Date: 3/24/2023  Authorization Period Expiration: 2/23/2024  Plan of Care Expiration: 5/5/2023  Progress Note Due: 4/24/2023  Visit # / Visits authorized: 1/20 (+1)  FOTO: 1/5     Precautions: Standard      Time In: 7:05 am  Time Out: 8:00 am  Total Appointment Time (timed & untimed codes): 55 minutes    SUBJECTIVE     Pt reports: she is doing well this morning with very minimal pain. Started having soreness on right lower extremity following the exercises. Rates pain as a 1/10 this morning.   She was compliant with home exercise program.  Response to previous treatment: no change   Functional change: no change     Pain: 1/10  Location: low back     OBJECTIVE     Objective Measures updated at progress report unless specified.     Treatment     Dexter received the treatments listed below:      therapeutic exercises to develop strength, endurance, ROM, flexibility, and core stabilization for 55 minutes including:  Home exercise program overview   Hip adduction with transverse abdominis contraction: 5"x20   Hip abduction bridge: green theraband - 3x10   Brace marching: 2x15   Supine shoulder extension multifidi isometric: 5"x20   Straight leg raise: 3x10   Prone hip extension: 3x10   Standing hip abduction: red theraband - 3x10   Freemotion SAPD with abdominal brace: 7# - 3x10   Nu-step: 7'xL3      Patient Education and Home Exercises     Home Exercises Provided and Patient Education Provided "     Education provided:   - home exercise program   - POC/Prognosis    Written Home Exercises Provided: yes. Exercises were reviewed and Dexter was able to demonstrate them prior to the end of the session.  Dexter demonstrated good  understanding of the education provided. See EMR under Patient Instructions for exercises provided during therapy sessions    ASSESSMENT   Dexter is a 22 y.o. female referred to outpatient Physical Therapy with a medical diagnosis of chronic lumbar radiculopathy and lumbar disc herniation. Patient presents 6 weeks post op left L4-5 hemilaminectomy and microdiscectomy with decreased pain from low back to left lateral mid-calf and tingling in dorsum of left foot. Overview of home exercise program at beginning of treatment session along with progressions focusing on transverse abdominis/multifidi activation. Planning to monitor response prior to updating home exercise program.     Dexter Is progressing well towards her goals.   Pt prognosis is Good.     Pt will continue to benefit from skilled outpatient physical therapy to address the deficits listed in the problem list box on initial evaluation, provide pt/family education and to maximize pt's level of independence in the home and community environment.     Pt's spiritual, cultural and educational needs considered and pt agreeable to plan of care and goals.     Anticipated barriers to physical therapy: surgery    Goals:   Short Term Goals (3 Weeks):  1. Patient will be compliant with home exercise program to supplement therapy in promoting functional mobility.  2. Patient will perform dead bug with good control to demonstrate improved core strength.  3. Patient will initiate lifting and carrying of </=10 lb weight without pain to promote return to work.   Long Term Goals (6 Weeks):   1. Patient will improve FOTO score to </= 38% limited to decrease perceived limitation with maintaining/changing body position.   2. Patient will perform  camilo with good control to demonstrate improved core strength.  3. Patient will improve impaired lower extremity myotomes >/=4+/5 to improve strength for functional tasks.  4. Patient will perform 30 lb weighted deadlift without pain or deficits to promote bending and lifting at work.    PLAN   Core stabilization   Lifting mechanics  Posterior chain strengthening     Jone Diallo, PT

## 2023-04-04 ENCOUNTER — OFFICE VISIT (OUTPATIENT)
Dept: NEUROSURGERY | Facility: CLINIC | Age: 23
End: 2023-04-04
Payer: MEDICAID

## 2023-04-04 ENCOUNTER — PATIENT MESSAGE (OUTPATIENT)
Dept: NEUROSURGERY | Facility: CLINIC | Age: 23
End: 2023-04-04

## 2023-04-04 VITALS — TEMPERATURE: 98 F | DIASTOLIC BLOOD PRESSURE: 72 MMHG | HEART RATE: 91 BPM | SYSTOLIC BLOOD PRESSURE: 105 MMHG

## 2023-04-04 DIAGNOSIS — Z98.890 STATUS POST LUMBAR LAMINECTOMY: Primary | ICD-10-CM

## 2023-04-04 PROCEDURE — 99024 PR POST-OP FOLLOW-UP VISIT: ICD-10-PCS | Mod: ,,, | Performed by: STUDENT IN AN ORGANIZED HEALTH CARE EDUCATION/TRAINING PROGRAM

## 2023-04-04 PROCEDURE — 3078F PR MOST RECENT DIASTOLIC BLOOD PRESSURE < 80 MM HG: ICD-10-PCS | Mod: CPTII,,, | Performed by: STUDENT IN AN ORGANIZED HEALTH CARE EDUCATION/TRAINING PROGRAM

## 2023-04-04 PROCEDURE — 99999 PR PBB SHADOW E&M-EST. PATIENT-LVL III: ICD-10-PCS | Mod: PBBFAC,,, | Performed by: STUDENT IN AN ORGANIZED HEALTH CARE EDUCATION/TRAINING PROGRAM

## 2023-04-04 PROCEDURE — 99213 OFFICE O/P EST LOW 20 MIN: CPT | Mod: PBBFAC | Performed by: STUDENT IN AN ORGANIZED HEALTH CARE EDUCATION/TRAINING PROGRAM

## 2023-04-04 PROCEDURE — 99999 PR PBB SHADOW E&M-EST. PATIENT-LVL III: CPT | Mod: PBBFAC,,, | Performed by: STUDENT IN AN ORGANIZED HEALTH CARE EDUCATION/TRAINING PROGRAM

## 2023-04-04 PROCEDURE — 1160F PR REVIEW ALL MEDS BY PRESCRIBER/CLIN PHARMACIST DOCUMENTED: ICD-10-PCS | Mod: CPTII,,, | Performed by: STUDENT IN AN ORGANIZED HEALTH CARE EDUCATION/TRAINING PROGRAM

## 2023-04-04 PROCEDURE — 1159F PR MEDICATION LIST DOCUMENTED IN MEDICAL RECORD: ICD-10-PCS | Mod: CPTII,,, | Performed by: STUDENT IN AN ORGANIZED HEALTH CARE EDUCATION/TRAINING PROGRAM

## 2023-04-04 PROCEDURE — 3074F PR MOST RECENT SYSTOLIC BLOOD PRESSURE < 130 MM HG: ICD-10-PCS | Mod: CPTII,,, | Performed by: STUDENT IN AN ORGANIZED HEALTH CARE EDUCATION/TRAINING PROGRAM

## 2023-04-04 PROCEDURE — 3078F DIAST BP <80 MM HG: CPT | Mod: CPTII,,, | Performed by: STUDENT IN AN ORGANIZED HEALTH CARE EDUCATION/TRAINING PROGRAM

## 2023-04-04 PROCEDURE — 1160F RVW MEDS BY RX/DR IN RCRD: CPT | Mod: CPTII,,, | Performed by: STUDENT IN AN ORGANIZED HEALTH CARE EDUCATION/TRAINING PROGRAM

## 2023-04-04 PROCEDURE — 99024 POSTOP FOLLOW-UP VISIT: CPT | Mod: ,,, | Performed by: STUDENT IN AN ORGANIZED HEALTH CARE EDUCATION/TRAINING PROGRAM

## 2023-04-04 PROCEDURE — 1159F MED LIST DOCD IN RCRD: CPT | Mod: CPTII,,, | Performed by: STUDENT IN AN ORGANIZED HEALTH CARE EDUCATION/TRAINING PROGRAM

## 2023-04-04 PROCEDURE — 3074F SYST BP LT 130 MM HG: CPT | Mod: CPTII,,, | Performed by: STUDENT IN AN ORGANIZED HEALTH CARE EDUCATION/TRAINING PROGRAM

## 2023-04-04 NOTE — PROGRESS NOTES
CHIEF COMPLAINT:    4-6 week follow-up    HPI:    Dexter Gupta is a 22 y.o. female who presents today for post-operative follow-up s/p left L4-5 microdiscectomy on 2/10/2023. Currently, the patient's symptoms are better since surgery and she denies recurrence of lower back or left leg pain.  She still has mild numbness over the dorsal left foot that does not bother her greatly.  She is working with PT and her left foot strength, gait and posture are improving.  Her incision has healed well and she denies any concerns.    ROS:    As reported in HPI    PE:    AAOX3  NAD    Lumbar incision:  Well healed    ROM:   Full    Strength:       Hip Flex. Knee Flex. Knee Ext. Dorsi Flex Plantar Flex EHL   RLE 5 5 5 5 5 5   LLE 5 5 5 5 5 5     Gait:  normal    IMAGING:  No new imaging    ASSESSMENT:   21 yo female s/p left MIS discectomy who is doing very well with near resolution of her prior left radicular symptoms and no recurrence of her prior leg pain.  There has been significant improvement vs resolution of her prior left foot drop.    PLAN:   Continue PT  Discussed expected graduated decrease in post op activity & weight lifting restrictions  F/u in 6 months

## 2023-04-10 ENCOUNTER — CLINICAL SUPPORT (OUTPATIENT)
Dept: REHABILITATION | Facility: HOSPITAL | Age: 23
End: 2023-04-10
Payer: MEDICAID

## 2023-04-10 DIAGNOSIS — Z74.09 IMPAIRED MOBILITY: ICD-10-CM

## 2023-04-10 DIAGNOSIS — R53.1 DECREASED STRENGTH: ICD-10-CM

## 2023-04-10 DIAGNOSIS — M54.42 CHRONIC LEFT-SIDED LOW BACK PAIN WITH LEFT-SIDED SCIATICA: Primary | ICD-10-CM

## 2023-04-10 DIAGNOSIS — G89.29 CHRONIC LEFT-SIDED LOW BACK PAIN WITH LEFT-SIDED SCIATICA: Primary | ICD-10-CM

## 2023-04-10 PROCEDURE — 97110 THERAPEUTIC EXERCISES: CPT | Mod: PN

## 2023-04-10 NOTE — PROGRESS NOTES
"[facility]   Physical Therapy Treatment Note     Name: Dexter Gupta  Clinic Number: [id number]    Therapy Diagnosis:   Encounter Diagnoses   Name Primary?    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-sided sciatica Yes    Decreased strength     Impaired mobility      Physician: Christina Conklin PA-C    Visit Date: 4/10/2023    Physician Orders: PT Eval and Treat   Medical Diagnosis from Referral: Lumbar radiculopathy, chronic [M54.16], Lumbar disc herniation with radiculopathy [M51.16]  Evaluation Date: 3/24/2023  Authorization Period Expiration: 2/23/2024  Plan of Care Expiration: 5/5/2023  Progress Note Due: 4/24/2023  Visit # / Visits authorized: 2/20 (+1)  FOTO: 3/5     Precautions: Standard      Time In: 0802  Time Out: 0900  Total Appointment Time (timed & untimed codes): 58 minutes    SUBJECTIVE     Pt reports: reports increased pain in right lateral thigh with bridges. Patient reports her right lower extremity also hurts with weightbearing on right side, bed mobility and walking  She was compliant with home exercise program.  Response to previous treatment: increased pain  Functional change: right lower extremity pain with bridges     Pain: 3/10  Location: right lateral thigh     OBJECTIVE     Objective Measures updated at progress report unless specified.     Treatment     Dexter received the treatments listed below:      therapeutic exercises to develop strength, endurance, ROM, flexibility, and core stabilization for 58 minutes including:    Seated sciatic nerve glide: 3x10 bilateral *     Hooklying:  Sciatic nerve glides: 2x20 bilateral with towel behind knee *  Transverse abdominus activation: 8"x10  + alternating march: 10+ reps bilateral. Patient reports ease of performance    + alternate lower extremity extension: 2x10 bilateral   Shoulder extension multifidi isometric: 8"x10; hands pushing down into vertically oriented dummbells.     Quadruped:  Alternate upper " "extremity raise: 10+ reps bilateral  Alternate lower extremity raise: x5 bilateral     Seated:  Hip adduction isometric with transverse abdominus activation: 8"x10  Hip abduction: blue theraband x20  Sit<>stands with hip abduction: blue theraband x10    Standing:  Floor to stand transfer via 1/2 kneel and side sit: x2. First rep with unilateral upper extremity support; second without  Rows: green theraband 2x10  Low rows: green theraband 2x10  Straight arm pull downs: green theraband 2x10  Posterior sling pull: green theraband x10 bilateral !    * no change in right lateral thigh pain  ! stabbing pain in right low back with reps on left     Patient Education and Home Exercises     Home Exercises Provided and Patient Education Provided     Education provided:   - home exercise program. Hold bridges    Written Home Exercises Provided: Continue prior home exercise program. Exercises were reviewed and Dexter was able to demonstrate them prior to the end of the session.  Dexter demonstrated good  understanding of the education provided. See EMR under Patient Instructions for exercises provided during therapy sessions    ASSESSMENT     Dexter is a 22 y.o. female referred to outpatient Physical Therapy with a medical diagnosis of chronic lumbar radiculopathy and lumbar disc herniation. Patient presents 8 weeks post op left L4-5 hemilaminectomy and microdiscectomy. Increased pain into right lateral thigh noted with  home performance bridge exercise. No lateral thigh pain noted during session however right sided back pain reported during posterior sling pulls. Excellent performance of floor to stand tranfers. Initial difficulty keeping trunk still during quadruped lower extremity extension.     Dexter Is progressing well towards her goals.   Pt prognosis is Good.   Pt will continue to benefit from skilled outpatient physical therapy to address the deficits listed in the problem list box on initial evaluation, provide " pt/family education and to maximize pt's level of independence in the home and community environment.     Pt's spiritual, cultural and educational needs considered and pt agreeable to plan of care and goals.  Anticipated barriers to physical therapy: surgery    Short Term Goals (3 Weeks):  1. Patient will be compliant with home exercise program to supplement therapy in promoting functional mobility.  2. Patient will perform dead bug with good control to demonstrate improved core strength.  3. Patient will initiate lifting and carrying of </=10 lb weight without pain to promote return to work.   Long Term Goals (6 Weeks):   1. Patient will improve FOTO score to </= 38% limited to decrease perceived limitation with maintaining/changing body position. Progressing, not met   2. Patient will perform plank with good control to demonstrate improved core strength. Progressing, not met   3. Patient will improve impaired lower extremity myotomes >/=4+/5 to improve strength for functional tasks. Progressing, not met   4. Patient will perform 30 lb weighted deadlift without pain or deficits to promote bending and lifting at work. Progressing, not met     PLAN     Monitor right lateral thigh pain. Pain < 5/10 during sessions.    Core stabilization  Posterior chain strengthening   Lifting mechanics     Gifty Raymundo, PT, DPT, OCS

## 2023-04-12 ENCOUNTER — CLINICAL SUPPORT (OUTPATIENT)
Dept: REHABILITATION | Facility: HOSPITAL | Age: 23
End: 2023-04-12
Payer: MEDICAID

## 2023-04-12 DIAGNOSIS — M54.42 CHRONIC LEFT-SIDED LOW BACK PAIN WITH LEFT-SIDED SCIATICA: Primary | ICD-10-CM

## 2023-04-12 DIAGNOSIS — G89.29 CHRONIC LEFT-SIDED LOW BACK PAIN WITH LEFT-SIDED SCIATICA: Primary | ICD-10-CM

## 2023-04-12 DIAGNOSIS — R53.1 DECREASED STRENGTH: ICD-10-CM

## 2023-04-12 DIAGNOSIS — Z74.09 IMPAIRED MOBILITY: ICD-10-CM

## 2023-04-12 PROCEDURE — 97110 THERAPEUTIC EXERCISES: CPT | Mod: PN

## 2023-04-12 NOTE — PROGRESS NOTES
"[facility]   Physical Therapy Treatment Note     Name: Dexter Gupta  Clinic Number: [id number]    Therapy Diagnosis:   Encounter Diagnoses   Name Primary?    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-sided sciatica Yes    Decreased strength     Impaired mobility      Physician: Christina Conklin PA-C    Visit Date: 4/12/2023    Physician Orders: PT Eval and Treat   Medical Diagnosis from Referral: Lumbar radiculopathy, chronic [M54.16], Lumbar disc herniation with radiculopathy [M51.16]  Evaluation Date: 3/24/2023  Authorization Period Expiration: 2/23/2024  Plan of Care Expiration: 5/5/2023  Progress Note Due: 4/24/2023  Visit # / Visits authorized: 3/20 (+1)  FOTO: 4/5     Precautions: Standard      Time In: 0805  Time Out: 0858  Total Appointment Time (timed & untimed codes): 53 minutes    SUBJECTIVE     Pt reports: notes increased pain while lying if back isn't flat against surface below and while shifting weight to right lower extremity in standing +/- mild lumbar flexion/extension.  She was compliant with home exercise program.  Response to previous treatment: decreased pain  Functional change: no pain with home exercise program now that bridges are excluded    Pain: 1/10; 4/10 at worst since last visit  Location: right lateral thigh     OBJECTIVE     Objective Measures updated at progress report unless specified.     Treatment     Dexter received the treatments listed below:      therapeutic exercises to develop strength, endurance, ROM, flexibility, and core stabilization for 53 minutes including:    Seated sciatic nerve glide: x20 bilateral. 2 rounds during session    Hooklying:  Transverse abdominus activation via posterior pelvic tilt: 5"x10   + alternate lower extremity extension: 3x10 bilateral   Shoulder extension multifidi isometric: 8"x10; hands pushing down into vertically oriented dummbells.     Quadruped alternate lower extremity raise: 2x10 bilateral "     Seated:  Sit<>stands with hip abduction: green theraband x30    Standing:  Rows: green theraband 3x10  Low rows: green theraband 2x10  Straight arm pull downs: green theraband 3x10  Hip hinge with hands behind back: 2x10  Dead lift: green theraband secured under feet x5    ! stabbing pain in right low back with reps on left     Patient Education and Home Exercises     Home Exercises Provided and Patient Education Provided     Education provided:   - continue home exercise program without bridges    Written Home Exercises Provided: Continue prior home exercise program. Exercises were reviewed and Dexter was able to demonstrate them prior to the end of the session.  Dexter demonstrated good  understanding of the education provided. See EMR under Patient Instructions for exercises provided during therapy sessions    ASSESSMENT     Dexter is a 22 y.o. female referred to outpatient Physical Therapy with a medical diagnosis of chronic lumbar radiculopathy and lumbar disc herniation. Patient presents 8 weeks post op left L4-5 hemilaminectomy and microdiscectomy. Improvement in pain today, allowing for progressions of existing exercise reps and addition of hinging with resistance. Right proximal leg pain easily provoked to mild/moderate levels by weight shifts in standing and supine.     Dexter Is progressing well towards her goals.   Pt prognosis is Good.   Pt will continue to benefit from skilled outpatient physical therapy to address the deficits listed in the problem list box on initial evaluation, provide pt/family education and to maximize pt's level of independence in the home and community environment.     Pt's spiritual, cultural and educational needs considered and pt agreeable to plan of care and goals.  Anticipated barriers to physical therapy: surgery    Short Term Goals (3 Weeks):  1. Patient will be compliant with home exercise program to supplement therapy in promoting functional mobility.  2. Patient  will perform dead bug with good control to demonstrate improved core strength.  3. Patient will initiate lifting and carrying of </=10 lb weight without pain to promote return to work.   Long Term Goals (6 Weeks):   1. Patient will improve FOTO score to </= 38% limited to decrease perceived limitation with maintaining/changing body position. Progressing, not met   2. Patient will perform plank with good control to demonstrate improved core strength. Progressing, not met   3. Patient will improve impaired lower extremity myotomes >/=4+/5 to improve strength for functional tasks. Progressing, not met   4. Patient will perform 30 lb weighted deadlift without pain or deficits to promote bending and lifting at work. Progressing, not met     PLAN     Monitor right lateral thigh pain. Pain < 5/10 during sessions.    Core stabilization  Posterior chain strengthening   Lifting mechanics     Gifty Raymundo, PT, DPT, OCS

## 2023-04-19 ENCOUNTER — PATIENT MESSAGE (OUTPATIENT)
Dept: NEUROSURGERY | Facility: CLINIC | Age: 23
End: 2023-04-19
Payer: MEDICAID

## 2023-04-19 ENCOUNTER — CLINICAL SUPPORT (OUTPATIENT)
Dept: REHABILITATION | Facility: HOSPITAL | Age: 23
End: 2023-04-19
Payer: MEDICAID

## 2023-04-19 DIAGNOSIS — R53.1 DECREASED STRENGTH: ICD-10-CM

## 2023-04-19 DIAGNOSIS — M54.42 CHRONIC LEFT-SIDED LOW BACK PAIN WITH LEFT-SIDED SCIATICA: Primary | ICD-10-CM

## 2023-04-19 DIAGNOSIS — Z74.09 IMPAIRED MOBILITY: ICD-10-CM

## 2023-04-19 DIAGNOSIS — G89.29 CHRONIC LEFT-SIDED LOW BACK PAIN WITH LEFT-SIDED SCIATICA: Primary | ICD-10-CM

## 2023-04-19 PROCEDURE — 97110 THERAPEUTIC EXERCISES: CPT | Mod: PN

## 2023-04-19 NOTE — PROGRESS NOTES
"[facility]   Physical Therapy Treatment Note     Name: Dexter Gupta  Clinic Number: [id number]    Therapy Diagnosis:   No diagnosis found.    Physician: Christina Conklin PA-C    Visit Date: 4/19/2023    Physician Orders: PT Eval and Treat   Medical Diagnosis from Referral: Lumbar radiculopathy, chronic [M54.16], Lumbar disc herniation with radiculopathy [M51.16]  Evaluation Date: 3/24/2023  Authorization Period Expiration: 2/23/2024  Plan of Care Expiration: 5/5/2023  Progress Note Due: 4/24/2023  Visit # / Visits authorized: 4/20 (+1)  FOTO: 4/5     Precautions: Standard      Time In: 8:05 am  Time Out: 8:53 am  Total Appointment Time (timed & untimed codes): 48 minutes    SUBJECTIVE     Pt reports: Went back to work on Monday full-time. She is super sore in the back of her legs. Significant right lateral gip pain about 5/10. Consistent sharp pains shooting into mid-thigh.    She was compliant with home exercise program.  Response to previous treatment: decreased pain  Functional change: no pain with home exercise program now that bridges are excluded    Pain: 5/10; 0/10 at rest and 2/10 while walking at end of session  Location: right lateral thigh     OBJECTIVE     Objective Measures updated at progress report unless specified.     Treatment     Dexter received the treatments listed below:      therapeutic exercises to develop strength, endurance, ROM, flexibility, and core stabilization for 53 minutes including:    Seated sciatic nerve glide: x20 bilateral. 2 rounds during session    Hooklying:  via posterior pelvic tilt: 5"x10   + alternate lower extremity extension: 3x10 bilateral !   + followed by supine sciatic nerve glides: 3x10 bilateral   Shoulder extension multifidi isometric: 8"x10; hands pushing down into vertically oriented dummbells.   Transverse abdominus activation with hip abduction into blue belt: 10x10"  Transverse abdominus activation with hip adduction ball: " "10x10"  Quadruped alternate lower extremity raise: 2x10 bilateral - HELD    Seated:  Sit<>stands with hip abduction: green theraband x30  Rows: red theraband 3x10  Low rows: yellow theraband 2x10  Straight arm pull downs: yellow theraband 2x10    HELD:  Hip hinge with hands behind back: 2x10  Dead lift: green theraband secured under feet x5    ! stabbing pain in right low back with reps on left     manual therapy techniques: Manual traction were applied to the: right hip and lumbar spine for 5 minutes, including:  Light manual lumbar distraction in hook-lying   Long axis hip distraction - right       Patient Education and Home Exercises     Home Exercises Provided and Patient Education Provided     Education provided:   - continue home exercise program without bridges    Written Home Exercises Provided: Continue prior home exercise program. Exercises were reviewed and Dexter was able to demonstrate them prior to the end of the session.  Dexter demonstrated good  understanding of the education provided. See EMR under Patient Instructions for exercises provided during therapy sessions    ASSESSMENT     Dexter is a 22 y.o. female referred to outpatient Physical Therapy with a medical diagnosis of chronic lumbar radiculopathy and lumbar disc herniation. Patient presents 9.5 weeks post op left L4-5 hemilaminectomy and microdiscectomy. Increase in right lateral hip and low back pain to 5/10 with consistent episodes of stabbing pain down to mid-thigh. Could be contributed to returning to work full-time this week. Reduced intensity of exercises secondary to flare up. Lighter theraband resistance for posterior chain strengthening and focused on hook-lying core stabilization training. Right leg pain provoked with left lower extremity extension with pelvic tilt. Improved symptoms in right hip with light lumbar distraction with isolations and long axis hip distraction. Improved intensity in lateral hip pain at end of " session.    Dexter Is progressing well towards her goals.   Pt prognosis is Good.   Pt will continue to benefit from skilled outpatient physical therapy to address the deficits listed in the problem list box on initial evaluation, provide pt/family education and to maximize pt's level of independence in the home and community environment.     Pt's spiritual, cultural and educational needs considered and pt agreeable to plan of care and goals.  Anticipated barriers to physical therapy: surgery    Short Term Goals (3 Weeks):  1. Patient will be compliant with home exercise program to supplement therapy in promoting functional mobility.  2. Patient will perform dead bug with good control to demonstrate improved core strength.  3. Patient will initiate lifting and carrying of </=10 lb weight without pain to promote return to work.   Long Term Goals (6 Weeks):   1. Patient will improve FOTO score to </= 38% limited to decrease perceived limitation with maintaining/changing body position. Progressing, not met   2. Patient will perform plank with good control to demonstrate improved core strength. Progressing, not met   3. Patient will improve impaired lower extremity myotomes >/=4+/5 to improve strength for functional tasks. Progressing, not met   4. Patient will perform 30 lb weighted deadlift without pain or deficits to promote bending and lifting at work. Progressing, not met     PLAN     Monitor right lateral thigh pain. Pain < 5/10 during sessions.    Core stabilization  Posterior chain strengthening   Lifting mechanics     Anna Mcfarland, PT, DPT

## 2023-04-20 ENCOUNTER — TELEPHONE (OUTPATIENT)
Dept: NEUROSURGERY | Facility: CLINIC | Age: 23
End: 2023-04-20
Payer: MEDICAID

## 2023-04-20 ENCOUNTER — PATIENT MESSAGE (OUTPATIENT)
Dept: NEUROSURGERY | Facility: CLINIC | Age: 23
End: 2023-04-20
Payer: MEDICAID

## 2023-04-20 NOTE — TELEPHONE ENCOUNTER
Per pt mom - pt is endorsing new R back and leg pain that can sometimes be 10/10. Per Dr Juarez scheduled her to see CHINA Avalos for soonest available. Mom confirmed time and date worked w her schedule

## 2023-04-21 ENCOUNTER — OFFICE VISIT (OUTPATIENT)
Dept: URGENT CARE | Facility: CLINIC | Age: 23
End: 2023-04-21
Payer: MEDICAID

## 2023-04-21 VITALS
TEMPERATURE: 98 F | BODY MASS INDEX: 45.36 KG/M2 | HEART RATE: 90 BPM | SYSTOLIC BLOOD PRESSURE: 116 MMHG | RESPIRATION RATE: 18 BRPM | OXYGEN SATURATION: 97 % | WEIGHT: 256 LBS | HEIGHT: 63 IN | DIASTOLIC BLOOD PRESSURE: 78 MMHG

## 2023-04-21 DIAGNOSIS — R52 BODY ACHES: ICD-10-CM

## 2023-04-21 DIAGNOSIS — H92.09 OTALGIA, UNSPECIFIED LATERALITY: ICD-10-CM

## 2023-04-21 DIAGNOSIS — R51.9 ACUTE NONINTRACTABLE HEADACHE, UNSPECIFIED HEADACHE TYPE: ICD-10-CM

## 2023-04-21 DIAGNOSIS — R09.81 NASAL CONGESTION: ICD-10-CM

## 2023-04-21 DIAGNOSIS — R09.82 POST-NASAL DRIP: ICD-10-CM

## 2023-04-21 DIAGNOSIS — J02.9 SORE THROAT: ICD-10-CM

## 2023-04-21 DIAGNOSIS — R05.9 COUGH, UNSPECIFIED TYPE: Primary | ICD-10-CM

## 2023-04-21 PROBLEM — G43.009 MIGRAINE WITHOUT AURA AND WITHOUT STATUS MIGRAINOSUS, NOT INTRACTABLE: Status: ACTIVE | Noted: 2022-08-25

## 2023-04-21 LAB
CTP QC/QA: YES
SARS-COV-2 AG RESP QL IA.RAPID: NEGATIVE

## 2023-04-21 PROCEDURE — 99213 OFFICE O/P EST LOW 20 MIN: CPT | Mod: S$GLB,,, | Performed by: NURSE PRACTITIONER

## 2023-04-21 PROCEDURE — 87811 SARS-COV-2 COVID19 W/OPTIC: CPT | Mod: QW,S$GLB,, | Performed by: NURSE PRACTITIONER

## 2023-04-21 PROCEDURE — 99213 PR OFFICE/OUTPT VISIT, EST, LEVL III, 20-29 MIN: ICD-10-PCS | Mod: S$GLB,,, | Performed by: NURSE PRACTITIONER

## 2023-04-21 PROCEDURE — 87811 SARS CORONAVIRUS 2 ANTIGEN POCT, MANUAL READ: ICD-10-PCS | Mod: QW,S$GLB,, | Performed by: NURSE PRACTITIONER

## 2023-04-21 RX ORDER — FLUTICASONE PROPIONATE 50 MCG
2 SPRAY, SUSPENSION (ML) NASAL DAILY PRN
Qty: 15.8 ML | Refills: 0 | Status: SHIPPED | OUTPATIENT
Start: 2023-04-21 | End: 2024-01-10 | Stop reason: CLARIF

## 2023-04-21 RX ORDER — ESCITALOPRAM OXALATE 10 MG/1
TABLET ORAL
COMMUNITY
Start: 2023-03-21 | End: 2023-11-28 | Stop reason: DRUGHIGH

## 2023-04-21 RX ORDER — TIZANIDINE 4 MG/1
4 TABLET ORAL 2 TIMES DAILY PRN
Status: ON HOLD | COMMUNITY
Start: 2023-02-03 | End: 2024-01-17 | Stop reason: HOSPADM

## 2023-04-21 NOTE — PROGRESS NOTES
"Subjective:      Patient ID: Dexter Gupta is a 22 y.o. female.    Vitals:  height is 5' 3" (1.6 m) and weight is 116.1 kg (256 lb). Her oral temperature is 97.6 °F (36.4 °C). Her blood pressure is 116/78 and her pulse is 90. Her respiration is 18 and oxygen saturation is 97%.     Chief Complaint: Cough    22 yr old female came in with complaints of a cough, earache, and congestion. Her symptoms started Wednesday.    22 year old female presents to clinic with complaints of productive cough, headache, body ache, postnasal drip, sore throat, earache, nasal congestion x 3 days. History positive for Pfizer covid vaccine x 2 doses 4/6/21, 5/6/21 and influenza vaccine 10/22/22. Reports returned to work after a 5 week absence and started feeling ill after contact with co-workers     Cough  This is a new problem. The current episode started in the past 7 days. The problem has been gradually worsening. The problem occurs constantly. The cough is Productive of sputum. Associated symptoms include ear pain, headaches, myalgias, nasal congestion, postnasal drip and a sore throat. Pertinent negatives include no chest pain, chills, ear congestion, fever, heartburn, hemoptysis, rash, rhinorrhea, shortness of breath, sweats, weight loss or wheezing. Nothing aggravates the symptoms. She has tried nothing for the symptoms.   Constitution: Negative for chills, sweating, fatigue and fever.   HENT:  Positive for ear pain, congestion, postnasal drip and sore throat.    Cardiovascular:  Negative for chest pain.   Respiratory:  Positive for cough. Negative for bloody sputum, shortness of breath and wheezing.    Gastrointestinal:  Negative for heartburn.   Musculoskeletal:  Positive for muscle ache.   Skin:  Negative for rash and erythema.   Neurological:  Positive for headaches.    Objective:     Physical Exam   Constitutional: She is oriented to person, place, and time. She appears well-developed.   HENT:   Head: Normocephalic and " atraumatic. Head is without abrasion, without contusion and without laceration.   Ears:   Right Ear: External ear normal.   Left Ear: External ear normal.   Nose: Mucosal edema and rhinorrhea present.   Mouth/Throat: Oropharynx is clear and moist and mucous membranes are normal.   Eyes: Conjunctivae, EOM and lids are normal. Pupils are equal, round, and reactive to light.   Neck: Trachea normal and phonation normal. Neck supple.   Cardiovascular: Normal rate, regular rhythm and normal heart sounds.   Pulmonary/Chest: Effort normal and breath sounds normal. No stridor. No respiratory distress.   Musculoskeletal: Normal range of motion.         General: Normal range of motion.   Neurological: She is alert and oriented to person, place, and time.   Skin: Skin is warm, dry, intact and no rash. Capillary refill takes less than 2 seconds. No abrasion, No burn, No bruising, No erythema and No ecchymosis   Psychiatric: Her speech is normal and behavior is normal. Judgment and thought content normal.   Nursing note and vitals reviewed.    Assessment:     1. Cough, unspecified type    2. Body aches    3. Nasal congestion    4. Post-nasal drip    5. Sore throat    6. Acute nonintractable headache, unspecified headache type    7. Otalgia, unspecified laterality      Results for orders placed or performed in visit on 04/21/23   SARS Coronavirus 2 Antigen, POCT Manual Read   Result Value Ref Range    SARS Coronavirus 2 Antigen Negative Negative     Acceptable Yes       Plan:       Cough, unspecified type  -     SARS Coronavirus 2 Antigen, POCT Manual Read    Body aches    Nasal congestion  -     fluticasone propionate (FLONASE) 50 mcg/actuation nasal spray; 2 sprays (100 mcg total) by Each Nostril route daily as needed for Rhinitis (nasal congestion).  Dispense: 15.8 mL; Refill: 0    Post-nasal drip  -     fluticasone propionate (FLONASE) 50 mcg/actuation nasal spray; 2 sprays (100 mcg total) by Each Nostril route  daily as needed for Rhinitis (nasal congestion).  Dispense: 15.8 mL; Refill: 0    Sore throat    Acute nonintractable headache, unspecified headache type    Otalgia, unspecified laterality  -     fluticasone propionate (FLONASE) 50 mcg/actuation nasal spray; 2 sprays (100 mcg total) by Each Nostril route daily as needed for Rhinitis (nasal congestion).  Dispense: 15.8 mL; Refill: 0      Patient Instructions   Please drink plenty of fluids.  Please get plenty of rest.  Please return here or go to the Emergency Department for any concerns or worsening of condition.  If you do not have Hypertension or any history of palpitations, it is ok to take over the counter Sudafed or Mucinex D or Allegra-D or Claritin-D or Zyrtec-D or plain Allegra or Claritin or Zyrtec.   If you do have Hypertension or palpitations, it is safe to take Coricidin HBP for relief of sinus symptoms.  We recommend you take Flonase (Fluticasone) or another nasally inhaled steroid unless you are already taking one.  Nasal irrigation with a saline spray or Netti Pot like device per their directions is also recommended.  If not allergic, please take over the counter Tylenol (Acetaminophen) and/or Motrin (Ibuprofen) as directed for control of pain and/or fever.  Please follow up with your primary care doctor or specialist as needed.    If you  smoke, please stop smoking.

## 2023-04-21 NOTE — PATIENT INSTRUCTIONS
Please drink plenty of fluids.  Please get plenty of rest.  Please return here or go to the Emergency Department for any concerns or worsening of condition.  If you do not have Hypertension or any history of palpitations, it is ok to take over the counter Sudafed or Mucinex D or Allegra-D or Claritin-D or Zyrtec-D or plain Allegra or Claritin or Zyrtec.   If you do have Hypertension or palpitations, it is safe to take Coricidin HBP for relief of sinus symptoms.  We recommend you take Flonase (Fluticasone) or another nasally inhaled steroid unless you are already taking one.  Nasal irrigation with a saline spray or Netti Pot like device per their directions is also recommended.  If not allergic, please take over the counter Tylenol (Acetaminophen) and/or Motrin (Ibuprofen) as directed for control of pain and/or fever.  Please follow up with your primary care doctor or specialist as needed.    If you  smoke, please stop smoking.

## 2023-04-25 ENCOUNTER — CLINICAL SUPPORT (OUTPATIENT)
Dept: REHABILITATION | Facility: HOSPITAL | Age: 23
End: 2023-04-25
Payer: MEDICAID

## 2023-04-25 DIAGNOSIS — Z74.09 IMPAIRED MOBILITY: ICD-10-CM

## 2023-04-25 DIAGNOSIS — R53.1 DECREASED STRENGTH: ICD-10-CM

## 2023-04-25 DIAGNOSIS — G89.29 CHRONIC LEFT-SIDED LOW BACK PAIN WITH LEFT-SIDED SCIATICA: Primary | ICD-10-CM

## 2023-04-25 DIAGNOSIS — M54.42 CHRONIC LEFT-SIDED LOW BACK PAIN WITH LEFT-SIDED SCIATICA: Primary | ICD-10-CM

## 2023-04-25 PROCEDURE — 97110 THERAPEUTIC EXERCISES: CPT | Mod: PN

## 2023-04-25 NOTE — PROGRESS NOTES
"[facility]   Physical Therapy Treatment Note     Name: Dexter Gupta  Clinic Number: [id number]    Therapy Diagnosis:   Encounter Diagnoses   Name Primary?    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-sided sciatica Yes    Decreased strength     Impaired mobility        Physician: Christina Conklin, [place]    Visit Date: 4/25/2023    Physician Orders: PT Eval and Treat   Medical Diagnosis from Referral: Lumbar radiculopathy, chronic [M54.16], Lumbar disc herniation with radiculopathy [M51.16]  Evaluation Date: 3/24/2023  Authorization Period Expiration: 2/23/2024  Plan of Care Expiration: 5/5/2023  Progress Note Due: 4/24/2023  Visit # / Visits authorized: 5/20 (+1)  FOTO: 2nd performed on 4/25/2023. Next at discharge.     Precautions: Standard      Time In: 7:05 am  Time Out: 8:00 am  Total Appointment Time (timed & untimed codes): 55 minutes    SUBJECTIVE     Pt reports: she is now part-time at work for one month per surgeon's suggestion. She is now have sharp, shocking pain in right side low back, thigh, and calf.  Has appointment with surgeon on May 8th.     She was compliant with home exercise program.  Response to previous treatment: decreased pain  Functional change: no pain with home exercise program now that bridges are excluded    Pain: 5/10  Location: right sided lumbosacral region, lateral thigh, and luis angel    OBJECTIVE     Objective Measures updated at progress report unless specified.     Treatment     Dexter received the treatments listed below:      therapeutic exercises to develop strength, endurance, ROM, flexibility, and core stabilization for 40 minutes including:    Seated sciatic nerve glide: x20 bilateral. 2 rounds during session.    Hooklying:  via posterior pelvic tilt: 5"x10   + alternate lower extremity extension: 3x10 bilateral - deferred ( ! - in R buttocks with left lower extremity extension)   + table top with alternating march: 3x10 bilateral   + dead bugs (with " "lower extremity marching): 3x10 bilateral  Shoulder extension multifidi isometric: 8"x10; hands pushing down into vertically oriented dummbells    Standing:  Side stepping multifidi isometric hold with arms extended with vertical dumbbell: 3 laps with 10 steps   Rows: green theraband 3x10  Low rows: red theraband 3x10  Straight arm pull downs: red theraband 3x10  Sit<>stands with hip abduction: green theraband x30      HELD:  Hip hinge with hands behind back: 2x10  Dead lift: green theraband secured under feet x5    ! stabbing pain in right low back with reps on left     manual therapy techniques: Manual traction were applied to the: right hip and lumbar spine for 15 minutes, including:  Light manual lumbar distraction in hook-lying with black belts  Long axis hip distraction - right       Patient Education and Home Exercises     Home Exercises Provided and Patient Education Provided     Education provided:   - continue home exercise program without bridges    Written Home Exercises Provided: Continue prior home exercise program. Exercises were reviewed and Dexter was able to demonstrate them prior to the end of the session.  Dexter demonstrated good  understanding of the education provided. See EMR under Patient Instructions for exercises provided during therapy sessions    ASSESSMENT     Dexter is a 22 y.o. female referred to outpatient Physical Therapy with a medical diagnosis of chronic lumbar radiculopathy and lumbar disc herniation. Patient presents 10 weeks post op left L4-5 hemilaminectomy and microdiscectomy. Continued stabbing pain in right sided low back with hook-lying and lower extremity extension. Improved with knee flexion march versus extension. Added core stability training and posterior chain strengthening in bold. Some discomfort with side stepping, but tolerable. Re-initiated higher theraband resistance periscapular strengthening with no complaints. Improved symptoms with gentle, pain-free lumbar " distraction and right long axis distraction. Patient frustrated with lack of progress and continued stabbing pain down right lower extremity.      Dexter Is progressing well towards her goals.   Pt prognosis is Good.   Pt will continue to benefit from skilled outpatient physical therapy to address the deficits listed in the problem list box on initial evaluation, provide pt/family education and to maximize pt's level of independence in the home and community environment.     Pt's spiritual, cultural and educational needs considered and pt agreeable to plan of care and goals.  Anticipated barriers to physical therapy: surgery    Short Term Goals (3 Weeks):  1. Patient will be compliant with home exercise program to supplement therapy in promoting functional mobility.  2. Patient will perform dead bug with good control to demonstrate improved core strength.  3. Patient will initiate lifting and carrying of </=10 lb weight without pain to promote return to work.   Long Term Goals (6 Weeks):   1. Patient will improve FOTO score to </= 38% limited to decrease perceived limitation with maintaining/changing body position. Progressing, not met   2. Patient will perform plank with good control to demonstrate improved core strength. Progressing, not met   3. Patient will improve impaired lower extremity myotomes >/=4+/5 to improve strength for functional tasks. Progressing, not met   4. Patient will perform 30 lb weighted deadlift without pain or deficits to promote bending and lifting at work. Progressing, not met     PLAN     Monitor right lateral thigh pain. Pain < 5/10 during sessions.    Core stabilization  Posterior chain strengthening   Lifting mechanics     Anna Mcfarland, PT, DPT

## 2023-04-26 ENCOUNTER — PATIENT MESSAGE (OUTPATIENT)
Dept: NEUROSURGERY | Facility: CLINIC | Age: 23
End: 2023-04-26
Payer: MEDICAID

## 2023-04-28 ENCOUNTER — CLINICAL SUPPORT (OUTPATIENT)
Dept: REHABILITATION | Facility: HOSPITAL | Age: 23
End: 2023-04-28
Payer: MEDICAID

## 2023-04-28 DIAGNOSIS — R53.1 DECREASED STRENGTH: ICD-10-CM

## 2023-04-28 DIAGNOSIS — Z74.09 IMPAIRED MOBILITY: ICD-10-CM

## 2023-04-28 DIAGNOSIS — G89.29 CHRONIC LEFT-SIDED LOW BACK PAIN WITH LEFT-SIDED SCIATICA: Primary | ICD-10-CM

## 2023-04-28 DIAGNOSIS — M54.42 CHRONIC LEFT-SIDED LOW BACK PAIN WITH LEFT-SIDED SCIATICA: Primary | ICD-10-CM

## 2023-04-28 PROCEDURE — 97110 THERAPEUTIC EXERCISES: CPT | Mod: PN

## 2023-04-28 NOTE — PROGRESS NOTES
[facility]   Physical Therapy Treatment Note     Name: Dexter Gupta  Clinic Number: [id number]    Therapy Diagnosis:   Encounter Diagnoses   Name Primary?    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-sided sciatica Yes    Decreased strength     Impaired mobility      Physician: Christina Conklin PA-C    Visit Date: 4/28/2023    Physician Orders: PT Eval and Treat   Medical Diagnosis from Referral: Lumbar radiculopathy, chronic [M54.16], Lumbar disc herniation with radiculopathy [M51.16]  Evaluation Date: 3/24/2023  Authorization Period Expiration: 2/23/2024  Plan of Care Expiration: 5/5/2023  Progress Note Due: 4/24/2023  Visit # / Visits authorized: 6/20 (+1)  FOTO: 2nd performed on 4/25/2023. Next at discharge.     Precautions: Standard      Time In: 0658  Time Out: 0807  Total Appointment Time (timed & untimed codes): 69 minutes    SUBJECTIVE     Pt reports: she is now part-time at work for one month per surgeon's suggestion. She is now have sharp, shocking pain in right side low back, thigh, and calf.  Has appointment with surgeon on May 8th.     She was compliant with home exercise program.  Response to previous treatment: decreased pain  Functional change: no pain with home exercise program now that bridges are excluded    Pain: 4/10  Location: right low back, hip and lateral thigh  Aggravating factors: Standing and walking    OBJECTIVE     4/28/2023:  NPRS: 3/10 pain in right hip after sciatic nerve glides    Gross movement:  Repeated flexion (instruction for end range as hands to distal thigh): x5. Resolution of pain  Repeated extension (partial range): x3. Pain into right buttock and proximal posterolateral thigh that increases in intensity over reps; resolves with return to neutral    Special tests:  Straight leg raise: (+) right at 30 degrees flexion    Treatment     Dexter received the treatments listed below:      therapeutic exercises to develop strength, endurance, ROM,  "flexibility, and core stabilization for 40 minutes including:    **Exercises listed in the order of performance    Prone lyin'. Reports of right superolateral buttock pain discharge next  Supine sciatic nerve glide: 3x10 with towel secured behind thigh. 30-60" breaks between sets  Supine glute sets (submaximal): 5", 2x5. 5"x10. 30-60" breaks between sets  Supine transverse abdominus activation: 5"x5. 5"x10. 30" break between sets  Supine straight arm pull down: Pink Cook band 2x10. 30" break between sets  Hooklying straight arm pull down + alternate march: Pink cook band x10 bilateral   Seated moderate arc lumbar flexion: Tall blue peanut ball, x20. Mild right low back pain towards end of reps    Standing:  Rows: green theraband x20  Straight arm pull downs: green theraband x20    manual therapy techniques: were applied to the: lumbar spine for 8 minutes, including:  Manual lumbar distraction in hook-lying with seat belts    Patient Education and Home Exercises     Home Exercises Provided and Patient Education Provided     Education provided:   - continue home exercise program   - standing postures to offload low back/right lower extremity     Written Home Exercises Provided: Continue prior home exercise program. Exercises were reviewed and Dexter was able to demonstrate them prior to the end of the session.  Dexter demonstrated good  understanding of the education provided. See EMR under Patient Instructions for exercises provided during therapy sessions    ASSESSMENT     Dexter is a 22 y.o. female that presents to outpatient Physical Therapy 10 weeks post op left L4-5 hemilaminectomy and microdiscectomy. Continued right sided radicular symptoms although not passing the knee today; present on the left side pre-op. Favorable response to mild to moderate range flexion and sciatic nerve glides and mild lordotic positioning in supine. Easily irritated with extension (including bridge during mat mobility) and " posterior pelvic tilts. Able to keep pain </=3/10 and to proximal posterolateral thigh throughout session. Patient would benefit from a lumbosacral orthotic to wear in standing to increase support and calm pain as therapy works do increase muscular spinal stability.     Dexter Is progressing well towards her goals.   Pt prognosis is Good.   Pt will continue to benefit from skilled outpatient physical therapy to address the deficits listed in the problem list box on initial evaluation, provide pt/family education and to maximize pt's level of independence in the home and community environment.     Pt's spiritual, cultural and educational needs considered and pt agreeable to plan of care and goals.  Anticipated barriers to physical therapy: surgery    Short Term Goals (3 Weeks):  1. Patient will be compliant with home exercise program to supplement therapy in promoting functional mobility.  2. Patient will perform dead bug with good control to demonstrate improved core strength.  3. Patient will initiate lifting and carrying of </=10 lb weight without pain to promote return to work.   Long Term Goals (6 Weeks):   1. Patient will improve FOTO score to </= 38% limited to decrease perceived limitation with maintaining/changing body position. Progressing, not met   2. Patient will perform plank with good control to demonstrate improved core strength. Progressing, not met   3. Patient will improve impaired lower extremity myotomes >/=4+/5 to improve strength for functional tasks. Progressing, not met   4. Patient will perform 30 lb weighted deadlift without pain or deficits to promote bending and lifting at work. Progressing, not met     PLAN     Monitor back and right lower extremity pain. </= 4/10 during sessions.    Flexion preference - partial range of motion   Core and posterior chain stabilization    Gifty Raymundo, PT, DPT

## 2023-05-01 ENCOUNTER — CLINICAL SUPPORT (OUTPATIENT)
Dept: REHABILITATION | Facility: HOSPITAL | Age: 23
End: 2023-05-01
Payer: MEDICAID

## 2023-05-01 DIAGNOSIS — G89.29 CHRONIC LEFT-SIDED LOW BACK PAIN WITH LEFT-SIDED SCIATICA: Primary | ICD-10-CM

## 2023-05-01 DIAGNOSIS — R53.1 DECREASED STRENGTH: ICD-10-CM

## 2023-05-01 DIAGNOSIS — M54.42 CHRONIC LEFT-SIDED LOW BACK PAIN WITH LEFT-SIDED SCIATICA: Primary | ICD-10-CM

## 2023-05-01 DIAGNOSIS — Z74.09 IMPAIRED MOBILITY: ICD-10-CM

## 2023-05-01 PROCEDURE — 97110 THERAPEUTIC EXERCISES: CPT | Mod: PN

## 2023-05-01 NOTE — PROGRESS NOTES
"[facility]   Physical Therapy Treatment Note     Name: Dexter Gupta  Clinic Number: [id number]    Therapy Diagnosis:   Encounter Diagnoses   Name Primary?    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-sided sciatica Yes    Decreased strength     Impaired mobility      Physician: Christina Conklin PA-C    Visit Date: 5/1/2023    Physician Orders: PT Eval and Treat   Medical Diagnosis from Referral: Lumbar radiculopathy, chronic [M54.16], Lumbar disc herniation with radiculopathy [M51.16]  Evaluation Date: 3/24/2023  Authorization Period Expiration: 2/23/2024  Plan of Care Expiration: 5/5/2023  Progress Note Due: 4/24/2023  Visit # / Visits authorized: 7/20 (+1)  FOTO: 2nd performed on 4/25/2023. Next at discharge.     Precautions: Standard; lumbar hemilaminectomy and microdiscectomy (Friday, February 10)      Time In: 0806  Time Out: 0859  Total Appointment Time (timed & untimed codes): 53 minutes    SUBJECTIVE     Pt reports: pain into calf resumed on Friday. Has appointment with surgeon on May 8th.     She was compliant with home exercise program.  Response to previous treatment: short term change in pain  Functional change: continued radicular right lower extremity pain affecting standing and walking    Pain: 4-5/10  Location: right low back, hip and lateral thigh and lateral mid-calf  Aggravating factors: Standing and walking    OBJECTIVE     4/28/2023:    Special tests:  Straight leg raise: (+) right at 30 degrees flexion    Treatment     Dexter received the treatments listed below:      therapeutic exercises to develop strength, endurance, ROM, flexibility, and core stabilization for 43 minutes including:    **Exercises listed in the order of performance      Seated sciatic nerve glide: x20 right with knee extension +  dorsiflexion   Supine sciatic nerve glide: 3x10 right with knee extension, towel secured behind thigh  Supine glute sets (submaximal): 5"x10. 2 rounds  Double leg " "bridge, partial range: x2 ! discharge next      x1 with hip adduction against pilates ball ! discharge next      x1 with hip abduction against seat belt ! discharge next  Supine transverse abdominus activation: 5"x20 ! mild, monitor next  Supine straight arm pull down: Pink Cook band 2x10. 30" break between sets  Hooklying straight arm pull down + alternate march: Pink cook band x10 bilateral   Seated hip hinge: Tall blue peanut ball, 2x10    Standing:  Rows: green theraband x20  Low rows: red theraband x20  Straight arm pull downs: green theraband x20 ! monitor next    manual therapy techniques: were applied to the: lumbar spine for 10 minutes, including:  Manual lumbar distraction in hook-lying with seat belts ! discharge next  Manual right hip distraction in hook-lying with seat belt    !=increased right lower extremity pain into lateral distal thigh and/or proximal lower leg    Patient Education and Home Exercises     Home Exercises Provided and Patient Education Provided     Education provided:   - continue home exercise program   - reviewed standing postures to offload low back/right lower extremity     Written Home Exercises Provided: Continue prior home exercise program. Exercises were reviewed and Dexter was able to demonstrate them prior to the end of the session.  Dexter demonstrated good  understanding of the education provided. See EMR under Patient Instructions for exercises provided during therapy sessions    ASSESSMENT     Dexter is a 22 y.o. female that presents to outpatient Physical Therapy 11 weeks post op left L4-5 hemilaminectomy and microdiscectomy. Right lower extremity pain present to mid-calf upon arrival and continues to occur early on in standing. Pain provoked during lumbar distraction and bridges despite bias for glute contraction. Patient would benefit from a lumbosacral orthotic to wear in standing to increase support and calm pain as therapy works do increase muscular spinal " stability.     Dexter Is progressing well towards her goals.   Pt prognosis is Good.   Pt will continue to benefit from skilled outpatient physical therapy to address the deficits listed in the problem list box on initial evaluation, provide pt/family education and to maximize pt's level of independence in the home and community environment.     Pt's spiritual, cultural and educational needs considered and pt agreeable to plan of care and goals.  Anticipated barriers to physical therapy: surgery    Short Term Goals (3 Weeks):  1. Patient will be compliant with home exercise program to supplement therapy in promoting functional mobility.  2. Patient will perform dead bug with good control to demonstrate improved core strength.  3. Patient will initiate lifting and carrying of </=10 lb weight without pain to promote return to work.   Long Term Goals (6 Weeks):   1. Patient will improve FOTO score to </= 38% limited to decrease perceived limitation with maintaining/changing body position. Progressing, not met   2. Patient will perform plank with good control to demonstrate improved core strength. Progressing, not met   3. Patient will improve impaired lower extremity myotomes >/=4+/5 to improve strength for functional tasks. Progressing, not met   4. Patient will perform 30 lb weighted deadlift without pain or deficits to promote bending and lifting at work. Progressing, not met     PLAN     Follow up with Dr. Juarez on May 8  Monitor back and right lower extremity pain. </= 4/10 during sessions.    Flexion preference - partial range of motion   Core and posterior chain stabilization    Gifty Raymundo, PT, DPT

## 2023-05-03 ENCOUNTER — CLINICAL SUPPORT (OUTPATIENT)
Dept: REHABILITATION | Facility: HOSPITAL | Age: 23
End: 2023-05-03
Payer: MEDICAID

## 2023-05-03 DIAGNOSIS — G89.29 CHRONIC LEFT-SIDED LOW BACK PAIN WITH LEFT-SIDED SCIATICA: Primary | ICD-10-CM

## 2023-05-03 DIAGNOSIS — M54.42 CHRONIC LEFT-SIDED LOW BACK PAIN WITH LEFT-SIDED SCIATICA: Primary | ICD-10-CM

## 2023-05-03 DIAGNOSIS — R53.1 DECREASED STRENGTH: ICD-10-CM

## 2023-05-03 DIAGNOSIS — Z74.09 IMPAIRED MOBILITY: ICD-10-CM

## 2023-05-03 PROCEDURE — 97110 THERAPEUTIC EXERCISES: CPT | Mod: PN

## 2023-05-03 NOTE — PROGRESS NOTES
"[facility]   Physical Therapy Treatment Note     Name: Dexter Gupta  Clinic Number: [id number]    Therapy Diagnosis:   Encounter Diagnoses   Name Primary?    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-sided sciatica Yes    Decreased strength     Impaired mobility      Physician: Christina Conklin PA-C    Visit Date: 5/3/2023    Physician Orders: PT Eval and Treat   Medical Diagnosis from Referral: Lumbar radiculopathy, chronic [M54.16], Lumbar disc herniation with radiculopathy [M51.16]  Evaluation Date: 3/24/2023  Authorization Period Expiration: 2/23/2024  Plan of Care Expiration: 6/9/2023  Visit # / Visits authorized: 9/20 (+1)  FOTO: 3rd performed on 5/3/2023. Next at discharge.     Precautions: Standard; lumbar hemilaminectomy and microdiscectomy (Friday, February 10)      Time In: 0802  Time Out: 0902  Total Appointment Time (timed & untimed codes): 60 minutes    SUBJECTIVE     Pt reports: pain is better today    She was compliant with home exercise program.  Response to previous treatment: mild pain during straight arm pull downs  Functional change: continued radicular right lower extremity pain affecting immediate standing and walking    Pain: 3/10  Location: right low back, hip and lateral thigh and lateral mid-calf  Aggravating factors: Standing and walking    OBJECTIVE     4/28/2023:    Special tests:  Straight leg raise: (+) right at 30 degrees flexion    Treatment     Dexter received the treatments listed below:      therapeutic exercises to develop strength, endurance, ROM, flexibility, and core stabilization for 52 minutes including:    **Exercises listed in the order of performance    Seated sciatic nerve glide: x20 right with knee extension +  dorsiflexion   Supine sciatic nerve glide: 3x10 right with knee extension, towel secured behind thigh. Second round + ankle dorsiflexion after manual therapy (after seated hip hinge)  Supine glute sets (submaximal): 5"x10. 2 " "rounds  Supine transverse abdominus activation: 5"x20   Supine straight arm pull down: Pink Cook band 2x10. 30" break between sets  Hooklying straight arm pull down + alternate march: Pink cook band x10 bilateral   Seated hip hinge: Tall blue peanut ball, 2x10    Standing:  Rows: blue theraband x20  Low rows: green theraband x20, offloading weight onto chair back (stabilized by therapist)  Straight arm pull downs: green theraband x20, offloading weight onto chair back (stabilized by therapist) !    !=increased right lower extremity pain    manual therapy techniques: were applied to the: lumbar spine for 8 minutes, including:  Manual right hip distraction in hook-lying with seat belt    Patient Education and Home Exercises     Home Exercises Provided and Patient Education Provided     Education provided:   - continue home exercise program   - reviewed standing postures to offload low back/right lower extremity     Written Home Exercises Provided: Continue prior home exercise program. Exercises were reviewed and Dexter was able to demonstrate them prior to the end of the session.  Dexter demonstrated good  understanding of the education provided. See EMR under Patient Instructions for exercises provided during therapy sessions    ASSESSMENT     See updated plan of care in Treatment section.     PLAN     See updated plan of care in Treatment section.   Follow up with Dr. Juarez's PA-C on May 8    Monitor back and right lower extremity pain. </= 4/10 during sessions.  Flexion preference - partial range of motion.   Core and posterior chain stabilization. Progress seated hip hinges.    Gifty Raymundo, PT, DPT, OCS                  "

## 2023-05-03 NOTE — PLAN OF CARE
[facility]  Physical Therapy Plan of Care Note     Name: Dexter Gupta  Clinic Number: [id number]    Therapy Diagnosis:   Encounter Diagnoses   Name Primary?    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-sided sciatica Yes    Decreased strength     Impaired mobility      Physician: Christina Conklin, PAEllynC    Visit Date: 5/3/2023    Physician Orders: PT Eval and Treat   Medical Diagnosis from Referral: Lumbar radiculopathy, chronic [M54.16], Lumbar disc herniation with radiculopathy [M51.16]  Evaluation Date: 3/24/2023  Authorization Period Expiration: 2/23/2024  Plan of Care Expiration: 5/5/2023 --> 6/9/2023  Visit # / Visits authorized: 9/20 (+1)  FOTO: 3rd performed on 5/3/2023. Next at discharge.    Precautions: Precautions: Standard; lumbar hemilaminectomy and microdiscectomy (Friday, February 10)   Functional Level Prior to Evaluation: Modified independent with cane, limited walking distances.    SUBJECTIVE     Update: Onset of right lower extremity pain complicates progress. Patient has a follow up visit with Dr. Juarez's PA ext week    OBJECTIVE     Update:     Straight leg raise (+) bilateral   Slump (+) bilateral. Right for right lateral thigh and mid-calf pain that doesn't change with neutral cervical positioning, left with left lateral mid-calf pain that resolves with neutral cervical positioning    Lower extremity myotomes Right Left Pain/dysfunction with movement   Hip flexion 4+/5 5/5    Hip extension Not tested  Not tested     Knee flexion 5/5 5/5    Knee extension 4+/5 5/5    Ankle dorsiflexion 4+/5 4+/5    Ankle plantarflexon 5/5 5/5    Ankle eversion 5/5 5/5    Big toe extension Not tested  Not tested       CMS Impairment/Limitation/Restriction for FOTO Lumbar Spine Survey    Therapist reviewed FOTO scores for Dexter Gupta on 5/3/2023.   FOTO documents entered into [employer] - see Media section.    Limitation Score: 46%  Category: Mobility     ASSESSMENT     Update: Dexter  "is a 22 y.o. female that presents to outpatient Physical Therapy 11 weeks post op left L4-5 hemilaminectomy and microdiscectomy. Right lower extremity pain present to mid-calf persists but lower upon arrival today. Continued radicular pain with straight arm pull downs despite incorporation of lumbar support. However the addition of support resolved pain with low rows (shorter lever arm). Progress in therapy has been limited since treatment began. Will continue pending what neurosurgery team decides at appointment on 5/8.     Patient would benefit from a lumbosacral orthotic to wear in standing to increase support and calm pain as therapy works do increase muscular spinal stability.     Dexter Is progressing well towards her goals.   Pt prognosis is Good.   Pt will continue to benefit from skilled outpatient physical therapy to address the deficits listed in the problem list box on initial evaluation, provide pt/family education and to maximize pt's level of independence in the home and community environment.     Pt's spiritual, cultural and educational needs considered and pt agreeable to plan of care and goals.  Anticipated barriers to physical therapy: surgery    Short Term Goals (6 Weeks):  1. Patient will score negative on neural tension tests to demonstrate healing of spine. Progressing, not met   2. Patient will initiate lifting and carrying of </=10 lb weight without pain to promote return to full duty work. Progressing, not met   3. Patient will not experience right lower extremity pain during 2 consecutive sessions to promote progression of rehab. Progressing, not met   Long Term Goals (12 Weeks):   1. Patient will improve FOTO score to </= 38% limited to decrease perceived limitation with maintaining/changing body position. Progressing, not met   2. Patient will perform plank for 40" good control to demonstrate improved core strength. Progressing, not met   3. Patient will improve impaired lower extremity " myotomes >/=4+/5 to improve strength for functional tasks. Progressing, not met   4. Patient will perform 30 lb weighted deadlift without pain or deficits to promote bending and lifting at work. Progressing, not met     New Short Term Goals Status:   Continue goals 1-3  Long Term Goal Status: Continue goals 1-3  Reasons for Recertification of Therapy:  plan of care expires Friday; continued work towards pain relief and goals    PLAN     Updated Certification Period: 5/3/2023 to 7/28/2023   Recommended Treatment Plan: 2 times per week for 12 weeks:  Electrical Stimulation -, Gait Training, Manual Therapy, Moist Heat/ Ice, Neuromuscular Re-ed, Patient Education, Self Care, Therapeutic Activities, and Therapeutic Exercise  Other Recommendations: Follow up with neurosurgery    Gifty Raymundo, PT

## 2023-05-08 ENCOUNTER — CLINICAL SUPPORT (OUTPATIENT)
Dept: REHABILITATION | Facility: HOSPITAL | Age: 23
End: 2023-05-08
Payer: MEDICAID

## 2023-05-08 ENCOUNTER — OFFICE VISIT (OUTPATIENT)
Dept: NEUROSURGERY | Facility: CLINIC | Age: 23
End: 2023-05-08
Payer: MEDICAID

## 2023-05-08 VITALS — DIASTOLIC BLOOD PRESSURE: 81 MMHG | SYSTOLIC BLOOD PRESSURE: 128 MMHG | HEART RATE: 80 BPM

## 2023-05-08 DIAGNOSIS — M51.16 LUMBAR DISC HERNIATION WITH RADICULOPATHY: ICD-10-CM

## 2023-05-08 DIAGNOSIS — M54.16 LUMBAR RADICULOPATHY, CHRONIC: Primary | ICD-10-CM

## 2023-05-08 DIAGNOSIS — M54.42 CHRONIC LEFT-SIDED LOW BACK PAIN WITH LEFT-SIDED SCIATICA: Primary | ICD-10-CM

## 2023-05-08 DIAGNOSIS — Z74.09 IMPAIRED MOBILITY: ICD-10-CM

## 2023-05-08 DIAGNOSIS — R53.1 DECREASED STRENGTH: ICD-10-CM

## 2023-05-08 DIAGNOSIS — G89.29 CHRONIC LEFT-SIDED LOW BACK PAIN WITH LEFT-SIDED SCIATICA: Primary | ICD-10-CM

## 2023-05-08 PROCEDURE — 99999 PR PBB SHADOW E&M-EST. PATIENT-LVL II: ICD-10-PCS | Mod: PBBFAC,,,

## 2023-05-08 PROCEDURE — 1159F PR MEDICATION LIST DOCUMENTED IN MEDICAL RECORD: ICD-10-PCS | Mod: CPTII,,,

## 2023-05-08 PROCEDURE — 3074F SYST BP LT 130 MM HG: CPT | Mod: CPTII,,,

## 2023-05-08 PROCEDURE — 99999 PR PBB SHADOW E&M-EST. PATIENT-LVL II: CPT | Mod: PBBFAC,,,

## 2023-05-08 PROCEDURE — 3074F PR MOST RECENT SYSTOLIC BLOOD PRESSURE < 130 MM HG: ICD-10-PCS | Mod: CPTII,,,

## 2023-05-08 PROCEDURE — 3079F DIAST BP 80-89 MM HG: CPT | Mod: CPTII,,,

## 2023-05-08 PROCEDURE — 1159F MED LIST DOCD IN RCRD: CPT | Mod: CPTII,,,

## 2023-05-08 PROCEDURE — 99024 POSTOP FOLLOW-UP VISIT: CPT | Mod: S$PBB,,,

## 2023-05-08 PROCEDURE — 99212 OFFICE O/P EST SF 10 MIN: CPT | Mod: PBBFAC

## 2023-05-08 PROCEDURE — 3079F PR MOST RECENT DIASTOLIC BLOOD PRESSURE 80-89 MM HG: ICD-10-PCS | Mod: CPTII,,,

## 2023-05-08 PROCEDURE — 99024 PR POST-OP FOLLOW-UP VISIT: ICD-10-PCS | Mod: S$PBB,,,

## 2023-05-08 PROCEDURE — 97110 THERAPEUTIC EXERCISES: CPT | Mod: PN

## 2023-05-08 RX ORDER — METHYLPREDNISOLONE 4 MG/1
TABLET ORAL
Qty: 21 EACH | Refills: 0 | Status: SHIPPED | OUTPATIENT
Start: 2023-05-08 | End: 2023-05-29

## 2023-05-08 NOTE — PROGRESS NOTES
"[facility]   Physical Therapy Treatment Note     Name: Dexter Gupta  Clinic Number: [id number]    Therapy Diagnosis:   Encounter Diagnoses   Name Primary?    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-sided sciatica Yes    Decreased strength     Impaired mobility      Physician: Christina Conklin PA-C    Visit Date: 5/8/2023    Physician Orders: PT Eval and Treat   Medical Diagnosis from Referral: Lumbar radiculopathy, chronic [M54.16], Lumbar disc herniation with radiculopathy [M51.16]  Evaluation Date: 3/24/2023  Authorization Period Expiration: 2/23/2024  Plan of Care Expiration: 6/9/2023  Visit # / Visits authorized: 10/20 (+1)  FOTO: 3rd performed on 5/3/2023. Next at discharge.     Precautions: Standard; lumbar hemilaminectomy and microdiscectomy (Friday, February 10)      Time In: 0805  Time Out: 0858  Total Appointment Time (timed & untimed codes): 53 minutes    SUBJECTIVE     Pt reports: pain similar to last visit    She was compliant with home exercise program.  Response to previous treatment: continued low to moderate right lower extremity pain  Functional change: continued radicular right lower extremity pain affecting standing and walking    Pain: 3/10; 5/10 at worst  Location: right low back, hip and lateral thigh and lateral mid-calf  Aggravating factors: Standing and walking    OBJECTIVE     4/28/2023:    Special tests:  Straight leg raise: (+) right at 30 degrees flexion    Treatment     Dexter received the treatments listed below:      therapeutic exercises to develop strength, endurance, ROM, flexibility, and core stabilization for 45 minutes including:    **Exercises listed in the order of performance    Seated sciatic nerve glide: x20 right with knee extension +  dorsiflexion   Supine sciatic nerve glide: 3x10 right with knee extension, towel secured behind thigh.   Supine transverse abdominus activation: 10"x10  Supine straight arm pull down: Pink Cook band x5  + " reverse march: x10 bilateral  + lower extremity dead bug: x10 bilateral   Quadruped bird dog: x3 bilateral ! Discharged  Quadruped alternate upper extremity lift: 2x10 bilateral   Quadruped hip hike: x10 bilateral   Seated hip hinge: Tall blue peanut ball, 2x10   Hands on hips: x10   3 lb dowel resting on shoulders, x10   + sit<>stand with glute set: x10    Standing:  Rows: blue theraband x20    !=increased right lower extremity pain    manual therapy techniques: were applied to the: lumbar spine for 8 minutes, including:  Manual right hip distraction in hook-lying with seat belt    Patient Education and Home Exercises     Home Exercises Provided and Patient Education Provided     Education provided:   - continue home exercise program   - future appts pending PA's approval    Written Home Exercises Provided: Continue prior home exercise program. Exercises were reviewed and Dexter was able to demonstrate them prior to the end of the session.  Dexter demonstrated good  understanding of the education provided. See EMR under Patient Instructions for exercises provided during therapy sessions    ASSESSMENT     Dexter is a 22 y.o. female that presents to outpatient Physical Therapy 11 weeks post op left L4-5 hemilaminectomy and microdiscectomy. Right lower extremity pain present to mid-calf similar to last visit. Adjusted posterior chain strengthening from standing to quadruped with tendency to weightshift left with right lower extremity lifts; held bird dog secondary to persistent radicular pain however able to improve form without pain during hip hike.     Dexter Is progressing well towards her goals.   Pt prognosis is Good.   Pt will continue to benefit from skilled outpatient physical therapy to address the deficits listed in the problem list box on initial evaluation, provide pt/family education and to maximize pt's level of independence in the home and community environment.      Pt's spiritual, cultural and  "educational needs considered and pt agreeable to plan of care and goals.  Anticipated barriers to physical therapy: Onset of contralateral (right) lower extremity pain post-op     Short Term Goals (6 Weeks):  1. Patient will score negative on neural tension tests to demonstrate healing of spine. Progressing, not met   2. Patient will initiate lifting and carrying of </=10 lb weight without pain to promote return to full duty work. Progressing, not met   3. Patient will not experience right lower extremity pain during 2 consecutive sessions to promote progression of rehab. Progressing, not met   Long Term Goals (12 Weeks):   1. Patient will improve FOTO score to </= 38% limited to decrease perceived limitation with maintaining/changing body position. Progressing, not met   2. Patient will perform plank for 40" good control to demonstrate improved core strength. Progressing, not met   3. Patient will improve impaired lower extremity myotomes >/=4+/5 to improve strength for functional tasks. Progressing, not met   4. Patient will perform 30 lb weighted deadlift without pain or deficits to promote bending and lifting at work. Progressing, not met     PLAN     Follow up with Dr. Juarez's PA-C on May 8. Continue therapy with her approval.    Monitor back and right lower extremity pain. </= 4/10 during sessions.  Flexion preference - partial range of motion.   Core and posterior chain stabilization. Progress seated hip hinges to weighted and standing    Gifty Raymundo, PT, DPT, OCS                    "

## 2023-05-10 ENCOUNTER — CLINICAL SUPPORT (OUTPATIENT)
Dept: REHABILITATION | Facility: HOSPITAL | Age: 23
End: 2023-05-10
Payer: MEDICAID

## 2023-05-10 DIAGNOSIS — Z74.09 IMPAIRED MOBILITY: ICD-10-CM

## 2023-05-10 DIAGNOSIS — R53.1 DECREASED STRENGTH: ICD-10-CM

## 2023-05-10 DIAGNOSIS — G89.29 CHRONIC LEFT-SIDED LOW BACK PAIN WITH LEFT-SIDED SCIATICA: Primary | ICD-10-CM

## 2023-05-10 DIAGNOSIS — M54.42 CHRONIC LEFT-SIDED LOW BACK PAIN WITH LEFT-SIDED SCIATICA: Primary | ICD-10-CM

## 2023-05-10 PROCEDURE — 97110 THERAPEUTIC EXERCISES: CPT | Mod: PN

## 2023-05-10 NOTE — PROGRESS NOTES
[facility]   Physical Therapy Treatment Note     Name: Dexter Gupta  Clinic Number: [id number]    Therapy Diagnosis:   Encounter Diagnoses   Name Primary?    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-sided sciatica Yes    Decreased strength     Impaired mobility      Physician: Christina Conklin, PAEllynC    Visit Date: 5/10/2023    Physician Orders: PT Eval and Treat   Medical Diagnosis from Referral: Lumbar radiculopathy, chronic [M54.16], Lumbar disc herniation with radiculopathy [M51.16]  Evaluation Date: 3/24/2023  Authorization Period Expiration: 2/23/2024  Plan of Care Expiration: 6/9/2023  Visit # / Visits authorized: 11/20 (+1)  FOTO: 3rd performed on 5/3/2023. Next at discharge.     Precautions: Standard; lumbar hemilaminectomy and microdiscectomy (Friday, February 10)      Time In: 0800  Time Out: 0847  Total Appointment Time (timed & untimed codes): 47 minutes    SUBJECTIVE     Pt reports: visited with Dr. Juarez's PA. Patient prescribed a steroid pack and started taking it yesterday. Patient notes no change in right sided lower extremity pain yet. Patient informed by the PA that an MRI will be performed if no change in pain with medication.     She was compliant with home exercise program.  Response to previous treatment: no change-continued low to moderate right lower extremity pain  Functional change: no change-continued radicular right lower extremity pain affecting standing and walking    Pain: 3/10; 5/10 at worst  Location: right low back, hip and lateral thigh and lateral mid-calf  Aggravating factors: Standing and walking    OBJECTIVE     Objective Measures updated at progress report unless specified.      Treatment     Dexter received the treatments listed below:      therapeutic exercises to develop strength, endurance, ROM, flexibility, and core stabilization for 37 minutes including:    **Exercises listed in the order of performance    Seated sciatic nerve glide: 3x10  "right with knee extension +  dorsiflexion   Supine sciatic nerve glide: 3x10 right with knee extension, towel secured behind thigh.   Supine transverse abdominus activation: 10"x10  Supine straight arm pull down: Pink Cook band x5  + reverse march: x10 bilateral  + lower extremity dead bug: x10 bilateral   Quadruped alternate upper extremity lift: 2x10 bilateral   Quadruped hip hike: x10 bilateral   Seated hip hinge: Tall blue peanut ball, 2x10   Hands on hips: x10   3 lb dowel resting on shoulders, x10 next with 5 lb plate at chest   + sit<>stand with glute set: x10 next with 5 lb plate at chest    Standing:  Rows: blue theraband x20  Hip abduction: red theraband x20 right with bilateral upper extremity support on mat  Hip extension:  red theraband x20 right with bilateral upper extremity support on mat    !=increased right lower extremity pain    manual therapy techniques: were applied to the: lumbar spine for 10 minutes, including:  Manual right hip distraction in hook-lying with seat belt    Patient Education and Home Exercises     Home Exercises Provided and Patient Education Provided     Education provided:   - continue home exercise program   - will schedule more appointments     Written Home Exercises Provided: Continue prior home exercise program. Exercises were reviewed and Dexter was able to demonstrate them prior to the end of the session.  Dexter demonstrated good  understanding of the education provided. See EMR under Patient Instructions for exercises provided during therapy sessions    ASSESSMENT     Dexter is a 22 y.o. female that presents to outpatient Physical Therapy 11 weeks post op left L4-5 hemilaminectomy and microdiscectomy. Pain remains consistent in intensity and location. Improved trunk control during quadruped exercises. Increased posterolateral hip strengthening with appropriate fatigue. Attempted sit<>stands with weight on upper back but held secondary to back pain. No right lower " "extremity pain provocation from exercises today.       Dexter Is progressing well towards her goals.   Pt prognosis is Good.   Pt will continue to benefit from skilled outpatient physical therapy to address the deficits listed in the problem list box on initial evaluation, provide pt/family education and to maximize pt's level of independence in the home and community environment.      Pt's spiritual, cultural and educational needs considered and pt agreeable to plan of care and goals.  Anticipated barriers to physical therapy: Onset of contralateral (right) lower extremity pain post-op     Short Term Goals (6 Weeks):  1. Patient will score negative on neural tension tests to demonstrate healing of spine. Progressing, not met   2. Patient will initiate lifting and carrying of </=10 lb weight without pain to promote return to full duty work. Progressing, not met   3. Patient will not experience right lower extremity pain during 2 consecutive sessions to promote progression of rehab. Progressing, not met   Long Term Goals (12 Weeks):   1. Patient will improve FOTO score to </= 38% limited to decrease perceived limitation with maintaining/changing body position. Progressing, not met   2. Patient will perform plank for 40" good control to demonstrate improved core strength. Progressing, not met   3. Patient will improve impaired lower extremity myotomes >/=4+/5 to improve strength for functional tasks. Progressing, not met   4. Patient will perform 30 lb weighted deadlift without pain or deficits to promote bending and lifting at work. Progressing, not met     PLAN     Monitor back and right lower extremity pain, </= 4/10 during sessions.  Flexion preference - partial range of motion.   Core and posterior chain stabilization. Progress seated hip hinges to weighted and standing    Gifty Raymundo, PT, DPT, OCS                      "

## 2023-05-12 NOTE — PROGRESS NOTES
[facility]  Neurosurgery    SUBJECTIVE:   History of Present Illness:  Dexter Gupta is a 22 y.o. female who is s/p left L4/5 MIS microdiscectomy on 2/10/23 with Dr. Juarez for left radiculopathy 2/2 HNP. Patient reports she had complete resolution of her pain post operatively; however about 2-3 weeks ago she started having right sided low back pain that radiates into her hip/buttock and travels down the back of her leg to her mid calf. The pain feels like the same type of pain she had in her left leg preop. The pain is constant and sharp/shocking. She denies any inciting event or injury. She has been working part time at a day care with light duty. She continues to have numbness to her left dorsum of her foot. She denies new numbness, paraesthesias, focal weakness, bowel/bladder dysfunction. She has been taking Tylenol and Ibuprofen without much relief. She is currently in PT/OT.    Review of patient's allergies indicates:  No Known Allergies    Current Outpatient Medications:     EScitalopram oxalate (LEXAPRO) 10 MG tablet, , Disp: , Rfl:     fluticasone propionate (FLONASE) 50 mcg/actuation nasal spray, 2 sprays (100 mcg total) by Each Nostril route daily as needed for Rhinitis (nasal congestion)., Disp: 15.8 mL, Rfl: 0    gabapentin (NEURONTIN) 300 MG capsule, Take 2 capsules (600 mg total) by mouth 3 (three) times daily., Disp: 180 capsule, Rfl: 11    medroxyPROGESTERone (PROVERA) 10 MG tablet, Take 1 tablet (10 mg total) by mouth once daily., Disp: 30 tablet, Rfl: 12    propranoloL (INDERAL LA) 80 MG 24 hr capsule, Take 80 mg by mouth once daily., Disp: , Rfl:     tiZANidine (ZANAFLEX) 4 MG tablet, Take 4 mg by mouth 2 (two) times daily as needed., Disp: , Rfl:     methylPREDNISolone (MEDROL DOSEPACK) 4 mg tablet, use as directed, Disp: 21 each, Rfl: 0     Past Medical History:   Diagnosis Date    Anxiety     Asthma     Other spondylosis with radiculopathy, lumbosacral region 10/17/2022     Past  Surgical History:   Procedure Laterality Date    MICRODISCECTOMY OF SPINE N/A 2/10/2023    Procedure: LEFT L4-L5 MICRODISCECTOMY, SPINE;  Surgeon: Lorie Juarez MD;  Location: [facility] OR [address] [place] [address];  Service: Neurosurgery;  Laterality: N/A;    TONSILLECTOMY      TYMPANOSTOMY TUBE PLACEMENT Bilateral     3 separates operations; 1998, 1999, 2001     Family History   Problem Relation Age of Onset    Celiac disease Mother     Cancer Father      Social History     Tobacco Use    Smoking status: Never     Passive exposure: Never    Smokeless tobacco: Never   Substance Use Topics    Alcohol use: Not Currently     Comment: rare    Drug use: Never       Review of Systems:  Constitutional: No fever or chills. No fatigue.  Eyes: No visual changes.  ENT: No nasal congestion or sore throat. No trouble swallowing.  Respiratory: No cough or shortness of breath.  Cardiovascular: No chest pain or palpitations.  Gastrointestinal: No nausea or vomiting, tolerating diet.  Genitourinary: No hematuria or dysuria.  Skin: No rash or pruritis.  Hematologic/Lymphatic: No easy bruising or lymphadenopathy.  Musculoskeletal: No arthralgias, myalgias or weakness.  Neurological: No seizures or tremors. No focal weakness. No dizziness.  Behavioral/Psych: No auditory or visual hallucinations. No SI/HI.  Endocrine: No heat or cold intolerance.    OBJECTIVE:     Vital Signs (Most Recent):  Pulse: 80 (05/08/23 1511)  BP: 128/81 (05/08/23 1511)    Physical Exam:  General: Well developed, well nourished, no distress.  Head: Normocephalic, atraumatic.  Neurologic: Alert and oriented. Thought content appropriate  GCS: Motor: 6/Verbal: 5/Eyes: 4 GCS Total: 15  Mental Status: Awake, Alert, Oriented x 4.  Language: No aphasia.  Speech: No dysarthria.  Cranial nerves: Face symmetric, tongue midline, CN II-XII grossly intact.   Eyes: Pupils equal, round, reactive to light with accommodation, EOMI.   Pulmonary: Normal respirations, not labored, no accessory  muscles used.  Abdomen: Soft, non-distended, not tender to palpation.  Sensory: Intact to light touch throughout.  Motor Strength: Moves all extremities spontaneously with good tone.  Full strength upper and lower extremities. No abnormal movements seen.     Strength  Deltoids Triceps Biceps Wrist Extension Wrist Flexion Hand    Upper: R 5/5 5/5 5/5 5/5 5/5 5/5    L 5/5 5/5 5/5 5/5 5/5 5/5     Iliopsoas Quadriceps Knee  Flexion Tibialis  anterior Gastro- cnemius EHL   Lower: R 5/5 5/5 5/5 5/5 5/5 5/5    L 5/5 5/5 5/5 5/5 5/5 5/5     Skin: Warm, dry and intact, no rashes.  Gait: Normal.    Straight leg raise positive on right.   Negative mariam test.  Negative thigh thrust.     Laboratory:  I have reviewed all pertinent lab results within the past 24 hours.    Diagnostic Results:  No new imaging to review.    ASSESSMENT/PLAN:     Dexter Gupta is a 22 y.o. female s/p left L4/5 MIS microdiscectomy on 2/20/23 for left radiculopathy now having right sided radicular pain. Will trial medrol dose pack. Continue alternating Tylenol and Ibuprofen extra strength. Continue PT/OT. Will f/u in 2 weeks to assess progress and possible need for new imaging.    All symptoms and signs that require emergent or urgent treatment were reviewed. The plan was discussed with the patient. All of the the patient's questions and concerns were answered and she voiced understanding. Please feel free to call with any further questions.     Shona Avalos PA-C  Neurosurgery   [facility]

## 2023-05-15 ENCOUNTER — PATIENT MESSAGE (OUTPATIENT)
Dept: NEUROSURGERY | Facility: CLINIC | Age: 23
End: 2023-05-15
Payer: MEDICAID

## 2023-05-15 ENCOUNTER — CLINICAL SUPPORT (OUTPATIENT)
Dept: REHABILITATION | Facility: HOSPITAL | Age: 23
End: 2023-05-15
Payer: MEDICAID

## 2023-05-15 DIAGNOSIS — R53.1 DECREASED STRENGTH: ICD-10-CM

## 2023-05-15 DIAGNOSIS — G89.29 CHRONIC LEFT-SIDED LOW BACK PAIN WITH LEFT-SIDED SCIATICA: Primary | ICD-10-CM

## 2023-05-15 DIAGNOSIS — Z74.09 IMPAIRED MOBILITY: ICD-10-CM

## 2023-05-15 DIAGNOSIS — M54.42 CHRONIC LEFT-SIDED LOW BACK PAIN WITH LEFT-SIDED SCIATICA: Primary | ICD-10-CM

## 2023-05-15 PROCEDURE — 97110 THERAPEUTIC EXERCISES: CPT | Mod: PN

## 2023-05-15 NOTE — PROGRESS NOTES
[facility]   Physical Therapy Treatment Note     Name: Dexter Gupta  Clinic Number: [id number]    Therapy Diagnosis:   Encounter Diagnoses   Name Primary?    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-sided sciatica Yes    Decreased strength     Impaired mobility      Physician: Christina Conklin PA-C    Visit Date: 5/15/2023    Physician Orders: PT Eval and Treat   Medical Diagnosis from Referral: Lumbar radiculopathy, chronic [M54.16], Lumbar disc herniation with radiculopathy [M51.16]  Evaluation Date: 3/24/2023  Authorization Period Expiration: 2/23/2024  Plan of Care Expiration: 6/9/2023  Visit # / Visits authorized: 12/20 (+1)  FOTO: 3rd performed on 5/3/2023. Next at discharge.     Precautions: Standard; lumbar hemilaminectomy and microdiscectomy (Friday, February 10)      Time In: 0803  Time Out: 0858  Total Appointment Time (timed & untimed codes): 55 minutes    SUBJECTIVE     Pt reports: she ran out of her steroid pack yesterday. Patient reports neurosurgery team should be contacting her but she will call today. Patient reports this morning is a good morning however pain and function have been the same with steroid pack.    She was compliant with home exercise program.  Response to previous treatment: no change-continued low to moderate right lower extremity pain  Functional change: no change-continued radicular right lower extremity pain affecting standing and walking    Pain: 2/10; 6/10 at worst  Location: right low back, hip and lateral thigh and lateral mid-calf  Aggravating factors: Standing and walking    OBJECTIVE     Objective Measures updated at progress report unless specified.      Treatment     Dexter received the treatments listed below:      therapeutic exercises to develop strength, endurance, ROM, flexibility, and core stabilization for 43 minutes including:    **Exercises listed in the order of performance    Seated sciatic nerve glide: 3x10 right with knee  extension +  dorsiflexion   Supine sciatic nerve glide: 3x10 right with knee extension, towel secured behind thigh.   Supine straight arm pull down:   + reverse march: Pink Cook band 3x10 bilateral  + lower extremity dead bug: Pink Cook band 3x10 bilateral   Quadruped alternate upper extremity lift: 3x10 bilateral   Quadruped hip hike: 2x10 bilateral   Seated hip hinge:   Tall blue peanut ball. 2x10   Hands on hips, x10   5 lb plate at chest, x10    + sit<>stand with glute set: 5 lb plate at chest, 3x10    Standing:  Rows: blue theraband x20  Hip abduction: red theraband x20 right with bilateral upper extremity support on mat  Hip extension:  red theraband x20 right with bilateral upper extremity support on mat    !=increased right lower extremity pain    manual therapy techniques: were applied to the: lumbar spine for 12 minutes, including:  Manual right hip distraction in hook-lying with seat belt    Patient Education and Home Exercises     Home Exercises Provided and Patient Education Provided     Education provided:   - continue home exercise program    Written Home Exercises Provided: Continue prior home exercise program. Exercises were reviewed and Dexter was able to demonstrate them prior to the end of the session.  Dexter demonstrated good  understanding of the education provided. See EMR under Patient Instructions for exercises provided during therapy sessions    ASSESSMENT     Dexter is a 22 y.o. female that presents to outpatient Physical Therapy 12 weeks post op left L4-5 hemilaminectomy and microdiscectomy. Pain fairly simillar in intensity and location. Mild (2/10) right lower extremity pain with standing hip extensions. Increased difficulty of core and posterior chain strengthening with good trunk control.      Dexter Is progressing well towards her goals.   Pt prognosis is Good.   Pt will continue to benefit from skilled outpatient physical therapy to address the deficits listed in the problem list  "box on initial evaluation, provide pt/family education and to maximize pt's level of independence in the home and community environment.      Pt's spiritual, cultural and educational needs considered and pt agreeable to plan of care and goals.  Anticipated barriers to physical therapy: Onset of contralateral (right) lower extremity pain post-op     Short Term Goals (6 Weeks):  1. Patient will score negative on neural tension tests to demonstrate healing of spine. Progressing, not met   2. Patient will initiate lifting and carrying of </=10 lb weight without pain to promote return to full duty work. Progressing, not met   3. Patient will not experience right lower extremity pain during 2 consecutive sessions to promote progression of rehab. Progressing, not met   Long Term Goals (12 Weeks):   1. Patient will improve FOTO score to </= 38% limited to decrease perceived limitation with maintaining/changing body position. Progressing, not met   2. Patient will perform plank for 40" good control to demonstrate improved core strength. Progressing, not met   3. Patient will improve impaired lower extremity myotomes >/=4+/5 to improve strength for functional tasks. Progressing, not met   4. Patient will perform 30 lb weighted deadlift without pain or deficits to promote bending and lifting at work. Progressing, not met     PLAN     Monitor back and right lower extremity pain, </= 4/10 during sessions.  Flexion preference - partial range of motion.   Core and posterior chain stabilization. Attempt dead lifts next.    Gifty Raymundo, PT, DPT, OCS                        "

## 2023-05-16 ENCOUNTER — PATIENT MESSAGE (OUTPATIENT)
Dept: NEUROSURGERY | Facility: CLINIC | Age: 23
End: 2023-05-16
Payer: MEDICAID

## 2023-05-16 DIAGNOSIS — M54.16 LUMBAR RADICULOPATHY, CHRONIC: Primary | ICD-10-CM

## 2023-05-17 ENCOUNTER — CLINICAL SUPPORT (OUTPATIENT)
Dept: REHABILITATION | Facility: HOSPITAL | Age: 23
End: 2023-05-17
Payer: MEDICAID

## 2023-05-17 DIAGNOSIS — R53.1 DECREASED STRENGTH: ICD-10-CM

## 2023-05-17 DIAGNOSIS — Z74.09 IMPAIRED MOBILITY: ICD-10-CM

## 2023-05-17 DIAGNOSIS — M54.42 CHRONIC LEFT-SIDED LOW BACK PAIN WITH LEFT-SIDED SCIATICA: Primary | ICD-10-CM

## 2023-05-17 DIAGNOSIS — G89.29 CHRONIC LEFT-SIDED LOW BACK PAIN WITH LEFT-SIDED SCIATICA: Primary | ICD-10-CM

## 2023-05-17 PROCEDURE — 97110 THERAPEUTIC EXERCISES: CPT | Mod: PN

## 2023-05-17 NOTE — PROGRESS NOTES
[facility]   Physical Therapy Treatment Note     Name: Dexter Gupta  Clinic Number: [id number]    Therapy Diagnosis:   Encounter Diagnoses   Name Primary?    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-sided sciatica Yes    Decreased strength     Impaired mobility      Physician: Christina Conklin PA-C    Visit Date: 5/17/2023    Physician Orders: PT Eval and Treat   Medical Diagnosis from Referral: Lumbar radiculopathy, chronic [M54.16], Lumbar disc herniation with radiculopathy [M51.16]  Evaluation Date: 3/24/2023  Authorization Period Expiration: 2/23/2024  Plan of Care Expiration: 6/9/2023  Visit # / Visits authorized: 13/20 (+1)  FOTO: 3rd performed on 5/3/2023. Next at discharge.     Precautions: Standard; lumbar hemilaminectomy and microdiscectomy (Friday, February 10)      Time In: 0806  Time Out: 0859  Total Appointment Time (timed & untimed codes): 53 minutes    SUBJECTIVE     Pt reports: MRI scheduled 6/23. Patient reports her neurosurgery team thinks she re-herniated her disc. Patient reports yesterday afternoon was worse with regards to pain. Patient reports she did a mix of walking, sitting in chair and on floor but it's nothing out of the ordinary. Patient reports these changes in position are gradual.     She was compliant with home exercise program.  Response to previous treatment: no change-continued low to moderate right lower extremity pain  Functional change: no change-continued radicular right lower extremity pain affecting standing and walking    Pain: 3/10; 5/10 at worst  Location: right low back, hip and lateral thigh and lateral mid-calf  Aggravating factors: Standing and walking    OBJECTIVE     Objective Measures updated at progress report unless specified.      Treatment     Dexter received the treatments listed below:      therapeutic exercises to develop strength, endurance, ROM, flexibility, and core stabilization for 43 minutes including:    **Exercises  listed in the order of performance    Seated sciatic nerve glide: 3x10 right with knee extension +  dorsiflexion. Additional 3x10 after standing hip hinge with weight  Supine sciatic nerve glide: 3x10 right with knee extension, towel secured behind thigh.   Supine straight arm pull down:   + lower extremity dead bug: Pink Cook band 3x10 bilateral   Quadruped hip hike: 3x10 bilateral   Quadruped lower extremity extension: 2x5 bilateral ! first set  Seated hip hinge:   Hands on hips, x10   5 lb plate at chest, x10    + sit<>stand with glute set and chest press: 5 lb plate at chest, 3x10  Standing hip hinge:   Hands on hips, x10   5 lb plate at chest, 2x10 ! second set   + sit<>stand with glute set and chest press: 5 lb plate at chest, 3x10    !=increased right lower extremity pain (into thigh with quadruped lower extremity extension and calf with standing hip hinge     manual therapy techniques: were applied to the: lumbar spine for 10 minutes, including:  Manual right hip distraction in hook-lying with seat belt    Patient Education and Home Exercises     Home Exercises Provided and Patient Education Provided     Education provided:   - continue home exercise program    Written Home Exercises Provided: Continue prior home exercise program. Exercises were reviewed and Dexter was able to demonstrate them prior to the end of the session.  Dexter demonstrated good  understanding of the education provided. See EMR under Patient Instructions for exercises provided during therapy sessions    ASSESSMENT     Dexter is a 22 y.o. female that presents to outpatient Physical Therapy 3 months post op left L4-5 hemilaminectomy and microdiscectomy. Pain is consistent. MRI scheduled next month. Able to progress posterior chain strengthening, some with increased right lower extremity pain. Good trunk control with quadruped lower extremity extensions. Partial range with these and standing hip hinges.      Dexter Is progressing well  "towards her goals.   Pt prognosis is Good.   Pt will continue to benefit from skilled outpatient physical therapy to address the deficits listed in the problem list box on initial evaluation, provide pt/family education and to maximize pt's level of independence in the home and community environment.      Pt's spiritual, cultural and educational needs considered and pt agreeable to plan of care and goals.  Anticipated barriers to physical therapy: Onset of contralateral (right) lower extremity pain post-op     Short Term Goals (6 Weeks):  1. Patient will score negative on neural tension tests to demonstrate healing of spine. Progressing, not met   2. Patient will initiate lifting and carrying of </=10 lb weight without pain to promote return to full duty work. Progressing, not met   3. Patient will not experience right lower extremity pain during 2 consecutive sessions to promote progression of rehab. Progressing, not met   Long Term Goals (12 Weeks):   1. Patient will improve FOTO score to </= 38% limited to decrease perceived limitation with maintaining/changing body position. Progressing, not met   2. Patient will perform plank for 40" good control to demonstrate improved core strength. Progressing, not met   3. Patient will improve impaired lower extremity myotomes >/=4+/5 to improve strength for functional tasks. Progressing, not met   4. Patient will perform 30 lb weighted deadlift without pain or deficits to promote bending and lifting at work. Progressing, not met     PLAN     Reduce frequency starting next week  Monitor back and right lower extremity pain, </= 4/10 during sessions  Core and posterior chain stabilization    Gifty Raymundo, PT, DPT, OCS                          "

## 2023-05-22 ENCOUNTER — CLINICAL SUPPORT (OUTPATIENT)
Dept: REHABILITATION | Facility: HOSPITAL | Age: 23
End: 2023-05-22
Payer: MEDICAID

## 2023-05-22 DIAGNOSIS — Z74.09 IMPAIRED MOBILITY: ICD-10-CM

## 2023-05-22 DIAGNOSIS — M54.42 CHRONIC LEFT-SIDED LOW BACK PAIN WITH LEFT-SIDED SCIATICA: Primary | ICD-10-CM

## 2023-05-22 DIAGNOSIS — R53.1 DECREASED STRENGTH: ICD-10-CM

## 2023-05-22 DIAGNOSIS — G89.29 CHRONIC LEFT-SIDED LOW BACK PAIN WITH LEFT-SIDED SCIATICA: Primary | ICD-10-CM

## 2023-05-22 PROCEDURE — 97110 THERAPEUTIC EXERCISES: CPT | Mod: PN

## 2023-05-22 NOTE — PROGRESS NOTES
[facility]   Physical Therapy Treatment Note     Name: Dexter Gupta  Clinic Number: [id number]    Therapy Diagnosis:   Encounter Diagnoses   Name Primary?    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-sided sciatica Yes    Decreased strength     Impaired mobility      Physician: No ref. provider found    Visit Date: 5/22/2023    Physician Orders: PT Eval and Treat   Medical Diagnosis from Referral: Lumbar radiculopathy, chronic [M54.16], Lumbar disc herniation with radiculopathy [M51.16]  Evaluation Date: 3/24/2023  Authorization Period Expiration: 2/23/2024  Plan of Care Expiration: 6/9/2023  Visit # / Visits authorized: 14/20 (+1)  FOTO: 3rd performed on 5/3/2023. Next at discharge.     Precautions: Standard; lumbar hemilaminectomy and microdiscectomy (Friday, February 10)      Time In: 0754  Time Out: 0902  Total Appointment Time (timed & untimed codes): 68 minutes    SUBJECTIVE     Pt reports: she went to the gym for the first thing and noticed shooting pain in a similar distribution in her left lower extremity. Patient notes she was wobbling in both directions and it was more pain than a workout. Patient notes she bears more weight on her left lower extremity while walking.      She was compliant with home exercise program.  Response to previous treatment: no change-continued low to moderate right lower extremity pain with standing and walking  Functional change: started treadmill walking at gym with onset of brief left lower extremity pain.     Pain: 3/10 currently; 2/10 at end  Location: right low back, hip and lateral thigh and lateral mid-calf  Aggravating factors: Standing and walking    OBJECTIVE     Objective Measures updated at progress report unless specified.      Treatment     Dexter received the treatments listed below:      therapeutic exercises to develop strength, endurance, ROM, flexibility, and core stabilization for 58 minutes including:    **Exercises listed in the  order of performance    Seated sciatic nerve glide: 3x10 right with knee extension +  dorsiflexion discharge next - unless radicular pain with certain exercise  Supine sciatic nerve glide: 3x10 right with knee extension, towel secured behind thigh discharge next  Supine straight arm pull down: Pink Cook band 3x10 bilateral discharge next  Supine reverse march: 3x10 bilateral discharge next  Supine lower extremity dead bug: 3x10 bilateral with therapist hand under back to reinforce posterior pelvic tilt  Quadruped hip hike: 3x10 bilateral discharge next  Quadruped lower extremity extension: 4x5 bilateral   Quadruped bird dog: 2x5 bilateral  *  Seated hip hinge:    5 lb plate at chest, x10    + sit<>stand with glute set and chest press: 5 lb plate at chest, 3x10  Standing hip hinge:   Hands on hips, x10   5 lb plate at chest, 2x10  Cybex leg press: Next 5.0  Standing hip abduction - partial range: 5 lbs x5 *! Adjust to mat next  Standing hip extension - partial range: 5 lbs x5 *! Adjust to mat next    !=increased bilateral lower extremity pain  *=Verbal cues for limited lower extremity range of motion     manual therapy techniques: were applied to the: lumbar spine for 10 minutes, including:  Manual right hip distraction in hook-lying with seat belt    Patient Education and Home Exercises     Home Exercises Provided and Patient Education Provided     Education provided:   - continue home exercise program  - stop treadmill walking    Written Home Exercises Provided: Continue prior home exercise program. Exercises were reviewed and Dexter was able to demonstrate them prior to the end of the session.  Dexter demonstrated good  understanding of the education provided. See EMR under Patient Instructions for exercises provided during therapy sessions    ASSESSMENT     Dexter is a 22 y.o. female that presents to outpatient Physical Therapy 3 months post op left L4-5 hemilaminectomy and microdiscectomy. Pain in right lower  "extremity remains stable; onset of left lower extremity pain with treadmill walking at gym. Progressed abdominal (lower extremity movement with self vs band facilitated posterior pelvic tilt) and posterior chain strengthening (bird dogs) on mat without lower extremity pain. Attempted posterolateral strengthening through partial range however bilateral lower extremity pain during performance.       Dexter Is progressing well towards her goals.   Pt prognosis is Good.   Pt will continue to benefit from skilled outpatient physical therapy to address the deficits listed in the problem list box on initial evaluation, provide pt/family education and to maximize pt's level of independence in the home and community environment.      Pt's spiritual, cultural and educational needs considered and pt agreeable to plan of care and goals.  Anticipated barriers to physical therapy: Onset of contralateral (right) lower extremity pain post-op     Short Term Goals (6 Weeks):  1. Patient will score negative on neural tension tests to demonstrate healing of spine. Progressing, not met   2. Patient will initiate lifting and carrying of </=10 lb weight without pain to promote return to full duty work. Progressing, not met   3. Patient will not experience right lower extremity pain during 2 consecutive sessions to promote progression of rehab. Progressing, not met   Long Term Goals (12 Weeks):   1. Patient will improve FOTO score to </= 38% limited to decrease perceived limitation with maintaining/changing body position. Progressing, not met   2. Patient will perform plank for 40" good control to demonstrate improved core strength. Progressing, not met   3. Patient will improve impaired lower extremity myotomes >/=4+/5 to improve strength for functional tasks. Progressing, not met   4. Patient will perform 30 lb weighted deadlift without pain or deficits to promote bending and lifting at work. Progressing, not met     PLAN "     1x/week.  Monitor back and right lower extremity pain, </= 4/10 during sessions.  Core and posterior chain stabilization. Open chain lower extremity strengthening.     Gifty Raymundo, PT, DPT, OCS

## 2023-05-29 ENCOUNTER — OFFICE VISIT (OUTPATIENT)
Dept: OBSTETRICS AND GYNECOLOGY | Facility: CLINIC | Age: 23
End: 2023-05-29
Payer: MEDICAID

## 2023-05-29 VITALS
SYSTOLIC BLOOD PRESSURE: 136 MMHG | WEIGHT: 266.75 LBS | HEART RATE: 106 BPM | HEIGHT: 63 IN | BODY MASS INDEX: 47.27 KG/M2 | DIASTOLIC BLOOD PRESSURE: 84 MMHG

## 2023-05-29 DIAGNOSIS — N92.6 IRREGULAR MENSTRUAL CYCLE: ICD-10-CM

## 2023-05-29 DIAGNOSIS — Z11.3 SCREENING FOR STDS (SEXUALLY TRANSMITTED DISEASES): ICD-10-CM

## 2023-05-29 DIAGNOSIS — Z01.419 ROUTINE GYNECOLOGICAL EXAMINATION: Primary | ICD-10-CM

## 2023-05-29 DIAGNOSIS — Z12.4 CERVICAL CANCER SCREENING: ICD-10-CM

## 2023-05-29 PROCEDURE — 87591 N.GONORRHOEAE DNA AMP PROB: CPT | Performed by: OBSTETRICS & GYNECOLOGY

## 2023-05-29 PROCEDURE — 3079F DIAST BP 80-89 MM HG: CPT | Mod: CPTII,,, | Performed by: OBSTETRICS & GYNECOLOGY

## 2023-05-29 PROCEDURE — 3008F BODY MASS INDEX DOCD: CPT | Mod: CPTII,,, | Performed by: OBSTETRICS & GYNECOLOGY

## 2023-05-29 PROCEDURE — 99999 PR PBB SHADOW E&M-EST. PATIENT-LVL III: ICD-10-PCS | Mod: PBBFAC,,, | Performed by: OBSTETRICS & GYNECOLOGY

## 2023-05-29 PROCEDURE — 99395 PREV VISIT EST AGE 18-39: CPT | Mod: S$PBB,,, | Performed by: OBSTETRICS & GYNECOLOGY

## 2023-05-29 PROCEDURE — 3079F PR MOST RECENT DIASTOLIC BLOOD PRESSURE 80-89 MM HG: ICD-10-PCS | Mod: CPTII,,, | Performed by: OBSTETRICS & GYNECOLOGY

## 2023-05-29 PROCEDURE — 99213 OFFICE O/P EST LOW 20 MIN: CPT | Mod: PBBFAC,PO | Performed by: OBSTETRICS & GYNECOLOGY

## 2023-05-29 PROCEDURE — 88141 PR  CYTOPATH CERV/VAG INTERPRET: ICD-10-PCS | Mod: ,,, | Performed by: STUDENT IN AN ORGANIZED HEALTH CARE EDUCATION/TRAINING PROGRAM

## 2023-05-29 PROCEDURE — 3075F SYST BP GE 130 - 139MM HG: CPT | Mod: CPTII,,, | Performed by: OBSTETRICS & GYNECOLOGY

## 2023-05-29 PROCEDURE — 1159F PR MEDICATION LIST DOCUMENTED IN MEDICAL RECORD: ICD-10-PCS | Mod: CPTII,,, | Performed by: OBSTETRICS & GYNECOLOGY

## 2023-05-29 PROCEDURE — 3008F PR BODY MASS INDEX (BMI) DOCUMENTED: ICD-10-PCS | Mod: CPTII,,, | Performed by: OBSTETRICS & GYNECOLOGY

## 2023-05-29 PROCEDURE — 1159F MED LIST DOCD IN RCRD: CPT | Mod: CPTII,,, | Performed by: OBSTETRICS & GYNECOLOGY

## 2023-05-29 PROCEDURE — 88175 CYTOPATH C/V AUTO FLUID REDO: CPT | Performed by: STUDENT IN AN ORGANIZED HEALTH CARE EDUCATION/TRAINING PROGRAM

## 2023-05-29 PROCEDURE — 99999 PR PBB SHADOW E&M-EST. PATIENT-LVL III: CPT | Mod: PBBFAC,,, | Performed by: OBSTETRICS & GYNECOLOGY

## 2023-05-29 PROCEDURE — 3075F PR MOST RECENT SYSTOLIC BLOOD PRESS GE 130-139MM HG: ICD-10-PCS | Mod: CPTII,,, | Performed by: OBSTETRICS & GYNECOLOGY

## 2023-05-29 PROCEDURE — 88141 CYTOPATH C/V INTERPRET: CPT | Mod: ,,, | Performed by: STUDENT IN AN ORGANIZED HEALTH CARE EDUCATION/TRAINING PROGRAM

## 2023-05-29 PROCEDURE — 99395 PR PREVENTIVE VISIT,EST,18-39: ICD-10-PCS | Mod: S$PBB,,, | Performed by: OBSTETRICS & GYNECOLOGY

## 2023-05-29 RX ORDER — MEDROXYPROGESTERONE ACETATE 10 MG/1
10 TABLET ORAL DAILY
Qty: 30 TABLET | Refills: 12 | Status: SHIPPED | OUTPATIENT
Start: 2023-05-29 | End: 2024-05-28

## 2023-05-29 NOTE — PROGRESS NOTES
"23 yo female who presents for routine gyn visit.  Has long history of irregular menstrual cycles.  Uses cyclic provera now.  Cycles are infrequent and light if they come.  Ok with STD testing.  Having back problems and may need surgery on her back again.      ROS:  GENERAL: Denies weight gain or weight loss. Feeling well overall.   SKIN: Denies rash or lesions.   HEAD: Denies head injury or headache.   CHEST: Denies chest pain or shortness of breath.   CARDIOVASCULAR: Denies palpitations or left sided chest pain.   ABDOMEN: No abdominal pain, constipation, diarrhea, nausea, vomiting or rectal bleeding.   URINARY: No frequency, dysuria, hematuria, or burning on urination.  REPRODUCTIVE: See HPI.   BREASTS: denies pain, lumps, or nipple discharge.   HEMATOLOGIC: No easy bruisability or excessive bleeding.   MUSCULOSKELETAL: Denies joint pain or swelling.   NEUROLOGIC: Denies syncope or weakness.   PSYCHIATRIC: Denies depression, anxiety or mood swings.       PE:   Vitals: /84   Pulse 106   Ht 5' 3" (1.6 m)   Wt 121 kg (266 lb 12.1 oz)   BMI 47.25 kg/m²   APPEARANCE: Well nourished, well developed, in no acute distress.  ABDOMEN: Soft. No tenderness or masses. No hepatosplenomegaly. No hernias. Obese.  PELVIC: Normal external female genitalia without lesions. Normal hair distribution. Adequate perineal body, normal urethral meatus. Vagina moist and well rugated without lesions or discharge. Cervix pink and without lesions. No significant cystocele or rectocele. Bimanual exam showed uterus normal size, shape, position, mobile and nontender. Adnexa without masses or tenderness. Urethra and bladder normal.    AP  Routine gyn  -s/p normal breast exam:   -s/p normal pelvic exam:   -Pap collected  -STD testing: gc/chl collected, HIV declined  -irregular cycles: continue cyclic provera      S MD Emanuel      "

## 2023-05-31 ENCOUNTER — TELEPHONE (OUTPATIENT)
Dept: REHABILITATION | Facility: HOSPITAL | Age: 23
End: 2023-05-31
Payer: COMMERCIAL

## 2023-05-31 LAB
C TRACH DNA SPEC QL NAA+PROBE: NOT DETECTED
N GONORRHOEA DNA SPEC QL NAA+PROBE: NOT DETECTED

## 2023-06-02 ENCOUNTER — CLINICAL SUPPORT (OUTPATIENT)
Dept: REHABILITATION | Facility: HOSPITAL | Age: 23
End: 2023-06-02
Attending: PHYSICIAN ASSISTANT
Payer: COMMERCIAL

## 2023-06-02 DIAGNOSIS — R53.1 DECREASED STRENGTH: ICD-10-CM

## 2023-06-02 DIAGNOSIS — M54.42 CHRONIC LEFT-SIDED LOW BACK PAIN WITH LEFT-SIDED SCIATICA: Primary | ICD-10-CM

## 2023-06-02 DIAGNOSIS — Z74.09 IMPAIRED MOBILITY: ICD-10-CM

## 2023-06-02 DIAGNOSIS — G89.29 CHRONIC LEFT-SIDED LOW BACK PAIN WITH LEFT-SIDED SCIATICA: Primary | ICD-10-CM

## 2023-06-02 PROCEDURE — 97110 THERAPEUTIC EXERCISES: CPT | Mod: PN

## 2023-06-02 NOTE — PROGRESS NOTES
[facility]   Physical Therapy Treatment Note     Name: Dexter Gupta  Clinic Number: [id number]    Therapy Diagnosis:   Encounter Diagnoses   Name Primary?    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-sided sciatica Yes    Decreased strength     Impaired mobility      Physician: Christina Conklin PA-C    Visit Date: 6/2/2023    Physician Orders: PT Eval and Treat   Medical Diagnosis from Referral: Lumbar radiculopathy, chronic [M54.16], Lumbar disc herniation with radiculopathy [M51.16]  Evaluation Date: 3/24/2023  Authorization Period Expiration: 2/23/2024  Plan of Care Expiration: 6/9/2023  Visit # / Visits authorized: 15/20 (+1)  FOTO: 3rd performed on 5/3/2023. Next at discharge.     Precautions: Standard; lumbar hemilaminectomy and microdiscectomy (Friday, February 10)      Time In: 0802  Time Out: 0846  Total Appointment Time (timed & untimed codes): 44 minutes    SUBJECTIVE     Pt reports: right lower extremity pain occurs all the time into lateral mid-calf. Left lower extremity pain occurs with prolonged walking into distal posterior thigh. Patient returned to work full time for financial reasons. Patient has her MRI on 6/23. Patient reports therapy reduces weekly pain.     She was compliant with home exercise program.  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      Treatment     Dexter received the treatments listed below:      therapeutic exercises to develop strength, endurance, ROM, flexibility, and core stabilization for 29 minutes including:    **Exercises listed in the order of performance    Seated sciatic nerve glide:  3x10 right with knee extension +  dorsiflexion   Supine sciatic nerve glide: 3x10 right with knee extension, towel secured behind thigh   Supine straight arm pull down: Pink Cook band 3x10 bilateral   Supine reverse march: x1 bilateral with right lower extremity pain and left low back discharge next    Pink Cook band 3x10 bilateral no pain  Supine lower extremity dead bug: x1 bilateral with right lower extremity pain and left low back discharge next  Supine clams: Next?  Sidelying hip abduction: Deferred secondary to pain in sidelying discharge next  Prone hip extension: x1 right with right lower extremity pain discharge next  Quadruped hip hike: 3x10  Quadruped lower extremity extension: x1 right with right lower extremity pain discharge next    Seated hip hinge:    5 lb plate at chest: 2x10    + sit<>stand with glute set: x5 with right lower extremity pain discharge next  + sit<>stand with glute set and chest press: x5 with right lower extremity pain discharge next  Standing hip hinge:   Hands on hips, x10   5 lb plate at chest, 2x10    !=increased bilateral lower extremity pain  *=Verbal cues for limited lower extremity range of motion     manual therapy techniques: were applied to the: lumbar spine for 15 minutes, including:  Manual lumbar distraction in hook-lying with seat belt    Patient Education and Home Exercises     Home Exercises Provided and Patient Education Provided     Education provided:   - continue home exercise program  - stop treadmill walking    Written Home Exercises Provided: Continue prior home exercise program. Exercises were reviewed and Dexter was able to demonstrate them prior to the end of the session.  Dexter demonstrated good  understanding of the education provided. See EMR under Patient Instructions for exercises provided during therapy sessions    ASSESSMENT     Dexter is a 22 y.o. female that presents to outpatient Physical Therapy 3 months post op left L4-5 hemilaminectomy and  "microdiscectomy. Greater pain that may be correlated to return to work. Regression of several exercises secondary to immediate right lower extremity pain provocation. Will continue once a week until MRI and follow up as patient experiences decreased pain after visits that increases tolerance to working full duty, full time without restrictions.      Dexter Is progressing well towards her goals.   Pt prognosis is Good.   Pt will continue to benefit from skilled outpatient physical therapy to address the deficits listed in the problem list box on initial evaluation, provide pt/family education and to maximize pt's level of independence in the home and community environment.      Pt's spiritual, cultural and educational needs considered and pt agreeable to plan of care and goals.  Anticipated barriers to physical therapy: Onset of contralateral (right) lower extremity pain post-op     Short Term Goals (6 Weeks):  1. Patient will score negative on neural tension tests to demonstrate healing of spine. Progressing, not met   2. Patient will initiate lifting and carrying of </=10 lb weight without pain to promote return to full duty work. Progressing, not met   3. Patient will not experience right lower extremity pain during 2 consecutive sessions to promote progression of rehab. Progressing, not met   Long Term Goals (12 Weeks):   1. Patient will improve FOTO score to </= 38% limited to decrease perceived limitation with maintaining/changing body position. Progressing, not met   2. Patient will perform plank for 40" good control to demonstrate improved core strength. Progressing, not met   3. Patient will improve impaired lower extremity myotomes >/=4+/5 to improve strength for functional tasks. Progressing, not met   4. Patient will perform 30 lb weighted deadlift without pain or deficits to promote bending and lifting at work. Progressing, not met     PLAN     1x/week until neurosurgery follow up.   Monitor back and " right lower extremity pain, </= 4/10 during sessions.  Core and posterior chain stabilization. Open chain hip strengthening.     Gifty Raymundo, PT, DPT, OCS

## 2023-06-05 LAB
FINAL PATHOLOGIC DIAGNOSIS: NORMAL
Lab: NORMAL

## 2023-06-08 ENCOUNTER — CLINICAL SUPPORT (OUTPATIENT)
Dept: REHABILITATION | Facility: HOSPITAL | Age: 23
End: 2023-06-08
Payer: COMMERCIAL

## 2023-06-08 DIAGNOSIS — Z74.09 IMPAIRED MOBILITY: ICD-10-CM

## 2023-06-08 DIAGNOSIS — R53.1 DECREASED STRENGTH: ICD-10-CM

## 2023-06-08 DIAGNOSIS — G89.29 CHRONIC LEFT-SIDED LOW BACK PAIN WITH LEFT-SIDED SCIATICA: Primary | ICD-10-CM

## 2023-06-08 DIAGNOSIS — M54.42 CHRONIC LEFT-SIDED LOW BACK PAIN WITH LEFT-SIDED SCIATICA: Primary | ICD-10-CM

## 2023-06-08 PROCEDURE — 97110 THERAPEUTIC EXERCISES: CPT | Mod: PN

## 2023-06-08 NOTE — PROGRESS NOTES
[facility]   Physical Therapy Treatment Note     Name: Dexter Gupta  Clinic Number: [id number]    Therapy Diagnosis:   Encounter Diagnoses   Name Primary?    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-sided sciatica Yes    Decreased strength     Impaired mobility      Physician: Christina Conklin PA-C    Visit Date: 6/8/2023    Physician Orders: PT Eval and Treat   Medical Diagnosis from Referral: Lumbar radiculopathy, chronic [M54.16], Lumbar disc herniation with radiculopathy [M51.16]  Evaluation Date: 3/24/2023  Authorization Period Expiration: 2/23/2024  Plan of Care Expiration: 6/9/2023 UPDATE NEXT  Visit # / Visits authorized: 16/20 (+1)  FOTO: 3rd performed on 5/3/2023. Next at discharge.     Precautions: Standard; lumbar hemilaminectomy and microdiscectomy (Friday, February 10)      Time In: 0800  Time Out: 0902  Total Appointment Time (timed & untimed codes): 62 minutes    SUBJECTIVE     Pt reports: right lower extremity pain is lower. Left lower extremity pain frequency is less; patient reports this occurs while walking or working for a few hours    She was compliant with home exercise program.  Response to previous treatment: decreased intensity of right lower extremity pain and frequency of left lower extremity pain  Functional change: decreased pain at worst (5/10 --> 3/10)      Pain: 3/10 upon arrival  Location: right low back, hip, lateral thigh and lateral mid-calf; left posterior thigh  Aggravating factors: Standing and walking    OBJECTIVE     Objective Measures updated at progress report unless specified.      Treatment     Dexter received the treatments listed below:      therapeutic exercises to develop strength, endurance, ROM, flexibility, and core stabilization for 42 minutes including:    **Exercises listed in the order of performance    Supine straight arm pull down: Pink Cook band 3x10 bilateral   Supine reverse march: Pink Cook band 3x10 bilateral   Attempt  "without cook band next  Supine lower extremity dead bug: Pink Cook band 2x10 bilateral ! Pain on second set  Supine clams: Black theraband 5" 3x20  Quadruped hip hike: Out of time, resume next. Attempt lower extremity extensions if hikes performed without pain    Seated hip hinge:    Hands on hips: x10  5 lb plate at chest: x10   Standing hip hinge:   Hands on hips, x10 ! Pain on return to neutral    manual therapy techniques: were applied to the: lumbar spine for 20 minutes, including:  Manual lumbar distraction in hook-lying with seat belt. Trial at beginning and end of session    Patient Education and Home Exercises     Home Exercises Provided and Patient Education Provided     Education provided:   - continue home exercise program    Written Home Exercises Provided: Continue prior home exercise program. Exercises were reviewed and Dexter was able to demonstrate them prior to the end of the session.  Dexter demonstrated good  understanding of the education provided. See EMR under Patient Instructions for exercises provided during therapy sessions    ASSESSMENT     Dexter is a 22 y.o. female that presents to outpatient Physical Therapy 3 months post op left L4-5 hemilaminectomy and microdiscectomy. Reduced pain bilaterally since last visit. Similar pain provocation during exercises resulting in hold of core or posterior chain progressions to visits. Added supine clams without issue.      Dexter Is progressing well towards her goals.   Pt prognosis is Good.   Pt will continue to benefit from skilled outpatient physical therapy to address the deficits listed in the problem list box on initial evaluation, provide pt/family education and to maximize pt's level of independence in the home and community environment.      Pt's spiritual, cultural and educational needs considered and pt agreeable to plan of care and goals.  Anticipated barriers to physical therapy: Onset of contralateral (right) lower extremity pain " "post-op     Short Term Goals (6 Weeks):  1. Patient will score negative on neural tension tests to demonstrate healing of spine. Progressing, not met   2. Patient will initiate lifting and carrying of </=10 lb weight without pain to promote return to full duty work. Progressing, not met   3. Patient will not experience right lower extremity pain during 2 consecutive sessions to promote progression of rehab. Progressing, not met   Long Term Goals (12 Weeks):   1. Patient will improve FOTO score to </= 38% limited to decrease perceived limitation with maintaining/changing body position. Progressing, not met   2. Patient will perform plank for 40" good control to demonstrate improved core strength. Progressing, not met   3. Patient will improve impaired lower extremity myotomes >/=4+/5 to improve strength for functional tasks. Progressing, not met   4. Patient will perform 30 lb weighted deadlift without pain or deficits to promote bending and lifting at work. Progressing, not met     PLAN     UPDATE POC NEXT     1x/week until neurosurgery follow up.   Monitor back and right lower extremity pain, </= 4/10 during sessions.  Core and posterior chain stabilization - progress as tolerated. Open chain hip strengthening.     Gifty Raymundo, PT, DPT, OCS                                "

## 2023-06-09 NOTE — PROGRESS NOTES
: pap is normal    Your pelvic exam will still need to occur once a year!    See you then!    Dr jacobsen

## 2023-06-12 ENCOUNTER — CLINICAL SUPPORT (OUTPATIENT)
Dept: REHABILITATION | Facility: HOSPITAL | Age: 23
End: 2023-06-12
Payer: MEDICAID

## 2023-06-12 DIAGNOSIS — R53.1 DECREASED STRENGTH: ICD-10-CM

## 2023-06-12 DIAGNOSIS — G89.29 CHRONIC LEFT-SIDED LOW BACK PAIN WITH LEFT-SIDED SCIATICA: Primary | ICD-10-CM

## 2023-06-12 DIAGNOSIS — Z74.09 IMPAIRED MOBILITY: ICD-10-CM

## 2023-06-12 DIAGNOSIS — M54.42 CHRONIC LEFT-SIDED LOW BACK PAIN WITH LEFT-SIDED SCIATICA: Primary | ICD-10-CM

## 2023-06-12 PROCEDURE — 97110 THERAPEUTIC EXERCISES: CPT | Mod: PN

## 2023-06-12 NOTE — PROGRESS NOTES
"[facility]   Physical Therapy Treatment Note     Name: Dexter Gupta  Clinic Number: [id number]    Therapy Diagnosis:   Encounter Diagnoses   Name Primary?    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-sided sciatica Yes    Decreased strength     Impaired mobility      Physician: Christina Conklin PA-C    Visit Date: 6/12/2023    Physician Orders: PT Eval and Treat   Medical Diagnosis from Referral: Lumbar radiculopathy, chronic [M54.16], Lumbar disc herniation with radiculopathy [M51.16]  Evaluation Date: 3/24/2023  Authorization Period Expiration: 2/23/2024  Plan of Care Expiration: 6/9/2023 --> 8/11/2023  Visit # / Visits authorized: 17/20 (+1)  FOTO: 4th performed on 6/12/2023. Next at discharge.     Precautions: Standard; lumbar hemilaminectomy and microdiscectomy (Friday, February 10)      Time In: 0803  Time Out: 0901  Total Appointment Time (timed & untimed codes): 58 minutes    SUBJECTIVE     Pt reports: pain in right lower extremity was much lower this past week. Decreased left lower extremity pain frequency reported last visit is similar (occurs while walking or working for a few hour)    She was compliant with home exercise program.  Response to previous treatment: decreased right lower extremity pain (0-3/10 range)  Functional change: increased tolerance to work, including standing and walking and lifting of infants    Pain: 2/10 upon arrival  Location: right lateral hip, thigh, and lower leg to mid-calf  Aggravating factors: Standing and walking    OBJECTIVE     See updated plan of care in Treatment section.     Treatment     Dexter received the treatments listed below:      therapeutic exercises to develop strength, endurance, ROM, flexibility, and core stabilization for 48 minutes including:    **Exercises listed in the order of performance    Hooklying clams: Black theraband 5" 2x30  Hooklying posterior pelvic tilt: 10"x10 with hands behind back for self tactile cue  + " reverse march: 2x10. ! (pain on second set)  Quadruped hip hike: 3x10 bilateral  Standing hip extensions with trunk prone on mat: 3x10 bilateral     Seated hip hinge:    Hands on hips: x10  5 lb plate at chest: x10   5 lb plate at chest + sit<>stand: x10   5 lb plate at chest + sit<>stand and chest press: x10 !   Standing hip hinge:   Hands on hips, x10   5 lb plate at chest: x10  Standing unilateral farmer's carry: 10 lbs x160 feet bilateral ! (with weight in left upper extremity)    !=pain in right lower extremity     manual therapy techniques: were applied to the: lumbar spine for 10 minutes, including:  Manual lumbar distraction in hook-lying with seat belt    Patient Education and Home Exercises     Home Exercises Provided and Patient Education Provided     Education provided:   - continue home exercise program    Written Home Exercises Provided: Continue prior home exercise program. Exercises were reviewed and Dexter was able to demonstrate them prior to the end of the session.  Dexter demonstrated good  understanding of the education provided. See EMR under Patient Instructions for exercises provided during therapy sessions    ASSESSMENT     See updated plan of care in Treatment section.     PLAN     See updated plan of care in Treatment section.     Monitor back and right lower extremity pain, </= 4/10 during sessions.  Core and posterior chain stabilization - progress as tolerated. Open chain hip strengthening.     Gifty Raymundo, PT, DPT, OCS

## 2023-06-12 NOTE — PLAN OF CARE
[facility]  Physical Therapy Plan of Care Note     Name: Dexter Gupta  Clinic Number: [id number]    Therapy Diagnosis:   Encounter Diagnoses   Name Primary?    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-sided sciatica Yes    Decreased strength     Impaired mobility      Physician: Christina Conklin PA-C    Visit Date: 6/12/2023    Physician Orders: PT Eval and Treat   Medical Diagnosis from Referral: Lumbar radiculopathy, chronic [M54.16], Lumbar disc herniation with radiculopathy [M51.16]  Evaluation Date: 3/24/2023  Authorization Period Expiration: 2/23/2024  Plan of Care Expiration: 6/9/2023 --> 8/11/2023  Visit # / Visits authorized: 17/20 (+1)  FOTO: 4th performed on 6/12/2023. Next at discharge.     Precautions: Standard; lumbar hemilaminectomy and microdiscectomy (Friday, February 10)   Functional Level Prior to Evaluation: Modified independent with cane, limited walking distances.    SUBJECTIVE     Update: Improvement in pain over the last two sessions since returning to full duty, full time at work 2-3 weeks ago. Patient feels like therapy is helping with tolerance to standing, walking, and lifting of infants. 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 Patient visits with neurosurgery later this month.     OBJECTIVE     Update:     CMS Impairment/Limitation/Restriction for FOTO Lumbar Spine Survey    Therapist reviewed FOTO scores for Dexter Gupta on 6/12/2023.   FOTO documents entered into [employer] - see Media section.    Limitation Score: 46%  Category: Mobility     Lower extremity myotomes Right Left Pain/dysfunction with movement   Hip flexion 4+/5 4+/5    Hip extension 4+/5 4+/5 Modified positioning; standing with trunk prone on mat   Knee flexion 5/5 5/5    Knee extension 5/5 5/5    Ankle dorsiflexion 5/5 5/5    Ankle plantarflexon 5/5 5/5    Ankle eversion 5/5 5/5      ASSESSMENT     Update: Dexter is a 22 y.o. female that presents to  "outpatient Physical Therapy 3 months post op left L4-5 hemilaminectomy and microdiscectomy. Right lower extremity pain continues to improve, positively impacting work function. Progressed difficulty of reverse march to self versus theraband assisted posterior pelvic tilt without pain. Progressed hip hinging in sitting and standing with weighted holds and presses away from center of gravity without pain. Partial achievement of short and long term goals, whereas none met at prior plan of care update.      Dexter Is progressing well towards her goals.   Pt prognosis is Good.   Pt will continue to benefit from skilled outpatient physical therapy to address the deficits listed in the problem list box on initial evaluation, provide pt/family education and to maximize pt's level of independence in the home and community environment.      Pt's spiritual, cultural and educational needs considered and pt agreeable to plan of care and goals.  Anticipated barriers to physical therapy: Onset of contralateral (right) lower extremity pain post-op     Short Term Goals (6 Weeks):  1. Patient will score negative on neural tension tests to demonstrate healing of spine. Progressing, not met   2. Patient will initiate lifting and carrying of </=10 lb weight without pain to promote return to full duty work. Partially met 06/12/2023   3. Patient will not experience right lower extremity pain during 2 consecutive sessions to promote progression of rehab. Progressing, not met   Long Term Goals (12 Weeks):   1. Patient will improve FOTO score to </= 38% limited to decrease perceived limitation with maintaining/changing body position. Progressing, not met   2. Patient will perform plank for 40" good control to demonstrate improved core strength. Progressing, not met   3. Patient will improve impaired lower extremity myotomes >/=4+/5 to improve strength for functional tasks. Met 06/12/2023   4. Patient will perform 30 lb weighted deadlift " without pain or deficits to promote bending and lifting at work. Progressing, not met     New Short Term Goals Status: Continue goals 1-3  Long Term Goal Status: Continue goals 1-2 and 4  Reasons for Recertification of Therapy: Plan of care . Progress towards short and long term goals.    PLAN     Updated Certification Period: 2023 to 2023   Recommended Treatment Plan: 1 times per week for 8 weeks:  Electrical Stimulation -, Gait Training, Manual Therapy, Moist Heat/ Ice, Neuromuscular Re-ed, Patient Education, Self Care, Therapeutic Activities, and Therapeutic Exercise  Other Recommendations: Continue home exercise program     Gifty Raymundo, PT, DPT, OCS

## 2023-06-19 ENCOUNTER — CLINICAL SUPPORT (OUTPATIENT)
Dept: REHABILITATION | Facility: HOSPITAL | Age: 23
End: 2023-06-19
Payer: MEDICAID

## 2023-06-19 DIAGNOSIS — R53.1 DECREASED STRENGTH: ICD-10-CM

## 2023-06-19 DIAGNOSIS — Z74.09 IMPAIRED MOBILITY: ICD-10-CM

## 2023-06-19 DIAGNOSIS — M54.42 CHRONIC LEFT-SIDED LOW BACK PAIN WITH LEFT-SIDED SCIATICA: Primary | ICD-10-CM

## 2023-06-19 DIAGNOSIS — G89.29 CHRONIC LEFT-SIDED LOW BACK PAIN WITH LEFT-SIDED SCIATICA: Primary | ICD-10-CM

## 2023-06-19 PROCEDURE — 97110 THERAPEUTIC EXERCISES: CPT | Mod: PN

## 2023-06-19 PROCEDURE — 97140 MANUAL THERAPY 1/> REGIONS: CPT | Mod: PN

## 2023-06-19 NOTE — PROGRESS NOTES
"[facility]   Physical Therapy Treatment Note     Name: Dexter Gupta  Clinic Number: [id number]    Therapy Diagnosis:   Encounter Diagnoses   Name Primary?    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-sided sciatica Yes    Decreased strength     Impaired mobility      Physician: Christina Conklin PA-C    Visit Date: 6/19/2023    Physician Orders: PT Eval and Treat   Medical Diagnosis from Referral: Lumbar radiculopathy, chronic [M54.16], Lumbar disc herniation with radiculopathy [M51.16]  Evaluation Date: 3/24/2023  Authorization Period Expiration: 2/23/2024  Plan of Care Expiration: 6/9/2023 --> 8/11/2023  Visit # / Visits authorized: 18/20 (+1)  FOTO: 4th performed on 6/12/2023. Next at discharge.     Precautions: Standard; lumbar hemilaminectomy and microdiscectomy (Friday, February 10)      Time In: 8:03 am  Time Out: 8:50 am  Total Appointment Time (timed & untimed codes): 47 minutes    SUBJECTIVE     Pt reports: gets MRI next Monday. Some radiating pain bilaterally down lateral thigh currently. Left lower extremity pain continues to fluctuates. Usually worse in the morning.    She was compliant with home exercise program.  Response to previous treatment: decreased right lower extremity pain (0-3/10 range)  Functional change: increased tolerance to work, including standing and walking and lifting of infants    Pain: 3/10 upon arrival; 0/10 end of session  Location: right lateral hip, thigh, and lower leg to mid-calf  Aggravating factors: Standing and walking    OBJECTIVE     See updated plan of care in Treatment section.     Treatment     Dexter received the treatments listed below:      therapeutic exercises to develop strength, endurance, ROM, flexibility, and core stabilization for 37 minutes including:    **Exercises listed in the order of performance    Hooklying clams: Black theraband 5" 2x30  Hooklying posterior pelvic tilt: 10"x10 with hands behind back for self tactile " cue  + reverse march: 2x10 (! half way through second set into right calf)  Quadruped hip hike: 3x10 bilateral  Standing hip extensions with trunk prone on mat: 3x10 bilateral     Seated hip hinge:    Hands on hips: x10  5 lb plate at chest: x10   5 lb plate at chest + sit<>stand: x10   5 lb plate at chest + sit<>stand and chest press: x10   Standing hip hinge:   Hands on hips, x10   5 lb plate at chest: x10  Standing unilateral farmer's carry: 10 lbs x 280 feet - 2 rounds  (with weight in left upper extremity)    !=pain in right lower extremity     manual therapy techniques: were applied to the: lumbar spine for 10 minutes, including:  Manual lumbar distraction in hook-lying with seat belt    Patient Education and Home Exercises     Home Exercises Provided and Patient Education Provided     Education provided:   - continue home exercise program    Written Home Exercises Provided: Continue prior home exercise program. Exercises were reviewed and Dexter was able to demonstrate them prior to the end of the session.  Dexter demonstrated good  understanding of the education provided. See EMR under Patient Instructions for exercises provided during therapy sessions    ASSESSMENT     Dexter is a 22 y.o. female that presents to outpatient Physical Therapy 3 months post op left L4-5 hemilaminectomy and microdiscectomy. Some increase pain into right calf half way through second set of alternating march with posterior pelvic tilt. No complaints of pain this session with sit<>stand to chest press as compared to previous visit. Able to tolerate further distance with farmer's carry with no flare up. Will monitor MRI results next visit.      PLAN     Monitor back and right lower extremity pain, </= 4/10 during sessions.  Core and posterior chain stabilization - progress as tolerated. Open chain hip strengthening.     Anna Mcfarland, PT, DPT

## 2023-06-26 ENCOUNTER — HOSPITAL ENCOUNTER (OUTPATIENT)
Dept: RADIOLOGY | Facility: HOSPITAL | Age: 23
Discharge: HOME | End: 2023-06-26
Payer: MEDICAID

## 2023-06-26 ENCOUNTER — OFFICE VISIT (OUTPATIENT)
Dept: NEUROSURGERY | Facility: CLINIC | Age: 23
End: 2023-06-26
Payer: MEDICAID

## 2023-06-26 VITALS
DIASTOLIC BLOOD PRESSURE: 85 MMHG | BODY MASS INDEX: 43.32 KG/M2 | WEIGHT: 260 LBS | HEART RATE: 92 BPM | HEIGHT: 65 IN | SYSTOLIC BLOOD PRESSURE: 124 MMHG | TEMPERATURE: 98 F

## 2023-06-26 DIAGNOSIS — M51.16 LUMBAR DISC HERNIATION WITH RADICULOPATHY: ICD-10-CM

## 2023-06-26 DIAGNOSIS — M51.26 RECURRENT HERNIATION OF LUMBAR DISC: Primary | ICD-10-CM

## 2023-06-26 DIAGNOSIS — M54.16 LUMBAR RADICULOPATHY, CHRONIC: ICD-10-CM

## 2023-06-26 PROCEDURE — 99213 OFFICE O/P EST LOW 20 MIN: CPT | Mod: PBBFAC,25 | Performed by: STUDENT IN AN ORGANIZED HEALTH CARE EDUCATION/TRAINING PROGRAM

## 2023-06-26 PROCEDURE — 1160F PR REVIEW ALL MEDS BY PRESCRIBER/CLIN PHARMACIST DOCUMENTED: ICD-10-PCS | Mod: CPTII,,, | Performed by: STUDENT IN AN ORGANIZED HEALTH CARE EDUCATION/TRAINING PROGRAM

## 2023-06-26 PROCEDURE — 3008F BODY MASS INDEX DOCD: CPT | Mod: CPTII,,, | Performed by: STUDENT IN AN ORGANIZED HEALTH CARE EDUCATION/TRAINING PROGRAM

## 2023-06-26 PROCEDURE — 3008F PR BODY MASS INDEX (BMI) DOCUMENTED: ICD-10-PCS | Mod: CPTII,,, | Performed by: STUDENT IN AN ORGANIZED HEALTH CARE EDUCATION/TRAINING PROGRAM

## 2023-06-26 PROCEDURE — 99213 PR OFFICE/OUTPT VISIT, EST, LEVL III, 20-29 MIN: ICD-10-PCS | Mod: S$PBB,,, | Performed by: STUDENT IN AN ORGANIZED HEALTH CARE EDUCATION/TRAINING PROGRAM

## 2023-06-26 PROCEDURE — 72148 MRI LUMBAR SPINE W/O DYE: CPT | Mod: 26,,, | Performed by: INTERNAL MEDICINE

## 2023-06-26 PROCEDURE — 1159F PR MEDICATION LIST DOCUMENTED IN MEDICAL RECORD: ICD-10-PCS | Mod: CPTII,,, | Performed by: STUDENT IN AN ORGANIZED HEALTH CARE EDUCATION/TRAINING PROGRAM

## 2023-06-26 PROCEDURE — 1160F RVW MEDS BY RX/DR IN RCRD: CPT | Mod: CPTII,,, | Performed by: STUDENT IN AN ORGANIZED HEALTH CARE EDUCATION/TRAINING PROGRAM

## 2023-06-26 PROCEDURE — 99999 PR PBB SHADOW E&M-EST. PATIENT-LVL III: CPT | Mod: PBBFAC,,, | Performed by: STUDENT IN AN ORGANIZED HEALTH CARE EDUCATION/TRAINING PROGRAM

## 2023-06-26 PROCEDURE — 99999 PR PBB SHADOW E&M-EST. PATIENT-LVL III: ICD-10-PCS | Mod: PBBFAC,,, | Performed by: STUDENT IN AN ORGANIZED HEALTH CARE EDUCATION/TRAINING PROGRAM

## 2023-06-26 PROCEDURE — 72148 MRI LUMBAR SPINE WITHOUT CONTRAST: ICD-10-PCS | Mod: 26,,, | Performed by: INTERNAL MEDICINE

## 2023-06-26 PROCEDURE — 99213 OFFICE O/P EST LOW 20 MIN: CPT | Mod: S$PBB,,, | Performed by: STUDENT IN AN ORGANIZED HEALTH CARE EDUCATION/TRAINING PROGRAM

## 2023-06-26 PROCEDURE — 3079F PR MOST RECENT DIASTOLIC BLOOD PRESSURE 80-89 MM HG: ICD-10-PCS | Mod: CPTII,,, | Performed by: STUDENT IN AN ORGANIZED HEALTH CARE EDUCATION/TRAINING PROGRAM

## 2023-06-26 PROCEDURE — 3074F SYST BP LT 130 MM HG: CPT | Mod: CPTII,,, | Performed by: STUDENT IN AN ORGANIZED HEALTH CARE EDUCATION/TRAINING PROGRAM

## 2023-06-26 PROCEDURE — 72148 MRI LUMBAR SPINE W/O DYE: CPT | Mod: TC

## 2023-06-26 PROCEDURE — 3074F PR MOST RECENT SYSTOLIC BLOOD PRESSURE < 130 MM HG: ICD-10-PCS | Mod: CPTII,,, | Performed by: STUDENT IN AN ORGANIZED HEALTH CARE EDUCATION/TRAINING PROGRAM

## 2023-06-26 PROCEDURE — 3079F DIAST BP 80-89 MM HG: CPT | Mod: CPTII,,, | Performed by: STUDENT IN AN ORGANIZED HEALTH CARE EDUCATION/TRAINING PROGRAM

## 2023-06-26 PROCEDURE — 1159F MED LIST DOCD IN RCRD: CPT | Mod: CPTII,,, | Performed by: STUDENT IN AN ORGANIZED HEALTH CARE EDUCATION/TRAINING PROGRAM

## 2023-06-26 RX ORDER — ESCITALOPRAM OXALATE 20 MG/1
20 TABLET ORAL
COMMUNITY
Start: 2023-05-18

## 2023-06-26 RX ORDER — PROPRANOLOL HYDROCHLORIDE 80 MG/1
80 CAPSULE, EXTENDED RELEASE ORAL NIGHTLY
COMMUNITY
Start: 2023-05-16

## 2023-06-27 ENCOUNTER — CLINICAL SUPPORT (OUTPATIENT)
Dept: REHABILITATION | Facility: HOSPITAL | Age: 23
End: 2023-06-27
Payer: MEDICAID

## 2023-06-27 DIAGNOSIS — M54.42 CHRONIC LEFT-SIDED LOW BACK PAIN WITH LEFT-SIDED SCIATICA: Primary | ICD-10-CM

## 2023-06-27 DIAGNOSIS — R53.1 DECREASED STRENGTH: ICD-10-CM

## 2023-06-27 DIAGNOSIS — G89.29 CHRONIC LEFT-SIDED LOW BACK PAIN WITH LEFT-SIDED SCIATICA: Primary | ICD-10-CM

## 2023-06-27 DIAGNOSIS — Z74.09 IMPAIRED MOBILITY: ICD-10-CM

## 2023-06-27 PROCEDURE — 97110 THERAPEUTIC EXERCISES: CPT | Mod: PN

## 2023-06-27 NOTE — PROGRESS NOTES
[facility]   Physical Therapy Treatment Note     Name: Dexter Gupta  Clinic Number: [id number]    Therapy Diagnosis:   Encounter Diagnoses   Name Primary?    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-sided sciatica Yes    Decreased strength     Impaired mobility      Physician: Christina Conklin PA-C    Visit Date: 6/27/2023    Physician Orders: PT Eval and Treat   Medical Diagnosis from Referral: Lumbar radiculopathy, chronic [M54.16], Lumbar disc herniation with radiculopathy [M51.16]  Evaluation Date: 3/24/2023  Authorization Period Expiration: 2/23/2024  Plan of Care Expiration: 6/9/2023 --> 8/11/2023  Visit # / Visits authorized: 18/20 (+1)  FOTO: 4th performed on 6/12/2023. Next at discharge.     Precautions: Standard; lumbar hemilaminectomy and microdiscectomy (Friday, February 10)      Time In: 0803  Time Out: 0852  Total Appointment Time (timed & untimed codes): 49 minutes    SUBJECTIVE     Pt reports: MRI performed Monday revealing even bigger disc herniation than last time. Patient's doctor wants to see how she does over the next 2-3 months to see if it resorbs on its own.     She was compliant with home exercise program.  Response to previous treatment: stable bilateral lower extremity pain (right in the 0-3/10 range; left intermittent with increased standing, walking and lifting of infants)   Functional change: surgeon has re-applied the BLT restriction; no lifting over 10 lbs.    Pain: 3/10 upon arrival; 0/10 end of session  Location: right lateral hip, thigh, and lower leg to mid-calf  Aggravating factors: Standing and walking    OBJECTIVE     Objective Measures updated at progress report unless specified.      Treatment     Dexter received the treatments listed below:      therapeutic exercises to develop strength, endurance, ROM, flexibility, and core stabilization for 40 minutes including:    **Exercises listed in the order of performance    Hooklying clams: Black  "theraband 5" 2x30  Hooklying posterior pelvic tilt: 10"x10 with hands behind back for self tactile cue  + reverse march: 2x10  + lower extremity dead bug: x1 bilateral !  Quadruped hip hike: 3x10 bilateral  Quadruped lower extremity extension: x1 bilateral !  Standing hip extensions with trunk prone on mat: 3x10 bilateral   Standing clamshells with trunk prone on mat: red theraband 2x10 bilateral     Seated hip hinge:   5 lb plate at chest: x10   5 lb plate at chest + sit<>stand: x10   5 lb plate at chest + sit<>stand and chest press: x10   Standing hip hinge:   Hands on hips, x10   5 lb plate at chest: x10  Standing unilateral farmer's carry: 10 lbs x 320 feet with weight in left upper extremity (! On final 160 feet)    !=pain in right lower extremity     manual therapy techniques: were applied to the: lumbar spine for 10 minutes, including:  Manual lumbar distraction in hook-lying with seat belt    Patient Education and Home Exercises     Home Exercises Provided and Patient Education Provided     Education provided:   - continue home exercise program    Written Home Exercises Provided: Continue prior home exercise program. Exercises were reviewed and Dexter was able to demonstrate them prior to the end of the session.  Dexter demonstrated good  understanding of the education provided. See EMR under Patient Instructions for exercises provided during therapy sessions    ASSESSMENT     Dexter is a 22 y.o. female that presents to outpatient Physical Therapy 3 months post op left L4-5 hemilaminectomy and microdiscectomy. Pain stable from last visit. Able to add hip abductor strengthening in standing however core and multifidi longer lever strengthening still provocative. Pain on second lap of morales's carry.    Dexter Is progressing well towards her goals.   Pt prognosis is Good.   Pt will continue to benefit from skilled outpatient physical therapy to address the deficits listed in the problem list box on initial " "evaluation, provide pt/family education and to maximize pt's level of independence in the home and community environment.      Pt's spiritual, cultural and educational needs considered and pt agreeable to plan of care and goals.  Anticipated barriers to physical therapy: Onset of contralateral (right) lower extremity pain post-op     Short Term Goals (6 Weeks):  1. Patient will score negative on neural tension tests to demonstrate healing of spine. Progressing, not met   2. Patient will initiate lifting and carrying of </=10 lb weight without pain to promote return to full duty work. Partially met 06/12/2023   3. Patient will not experience right lower extremity pain during 2 consecutive sessions to promote progression of rehab. Progressing, not met   Long Term Goals (12 Weeks):   1. Patient will improve FOTO score to </= 38% limited to decrease perceived limitation with maintaining/changing body position. Progressing, not met   2. Patient will perform plank for 40" good control to demonstrate improved core strength. Progressing, not met   3. Patient will improve impaired lower extremity myotomes >/=4+/5 to improve strength for functional tasks. Met 06/12/2023   4. Patient will perform 30 lb weighted deadlift without pain or deficits to promote bending and lifting at work. Progressing, not met     PLAN     Potential decrease in frequency to once every other week.     Monitor back and right lower extremity pain, </= 4/10 during sessions.  Core and posterior chain stabilization - progress as tolerated. Open chain hip strengthening.     Gifty Raymundo, PT, DPT        "

## 2023-06-29 NOTE — PROGRESS NOTES
[facility]  Neurosurgery    SUBJECTIVE:   History of Present Illness:  Dexter Gupta is a 22 y.o. female who is s/p left L4/5 MIS microdiscectomy on 2/10/23 for left leg radiculopathy with associated left foot drop who presents for follow up after repeat imaging obtained for evaluation of new right leg pain. She was seen by Shona Avalos PA-C on 5/8/23 for new onset right sided low back pain that radiates into her hip/buttock and travels down the lateral and anterior thigh to her knee. The pain feels like the same type of pain she had in her left leg but not as debilitating. Patient reports she had complete resolution of her left leg pain post operatively but now has rare shooting pain on the left.  She continues to have numbness to her left dorsum of her foot. She denies new numbness, paraesthesias, weakness, or bowel/bladder dysfunction. She is currently in PT.  She is taking neurontin 600mg tid and takes tylenol and ibuprofen as needed.  Minimal benefit from prior medrol dose pack.    Review of patient's allergies indicates:  No Known Allergies    Current Outpatient Medications:     EScitalopram oxalate (LEXAPRO) 20 MG tablet, Take 20 mg by mouth., Disp: , Rfl:     gabapentin (NEURONTIN) 300 MG capsule, Take 2 capsules (600 mg total) by mouth 3 (three) times daily., Disp: 180 capsule, Rfl: 11    INNOPRAN XL 80 mg 24 hr capsule, Take 80 mg by mouth every evening., Disp: , Rfl:     medroxyPROGESTERone (PROVERA) 10 MG tablet, Take 1 tablet (10 mg total) by mouth once daily., Disp: 30 tablet, Rfl: 12    tiZANidine (ZANAFLEX) 4 MG tablet, Take 4 mg by mouth 2 (two) times daily as needed., Disp: , Rfl:     EScitalopram oxalate (LEXAPRO) 10 MG tablet, , Disp: , Rfl:     fluticasone propionate (FLONASE) 50 mcg/actuation nasal spray, 2 sprays (100 mcg total) by Each Nostril route daily as needed for Rhinitis (nasal congestion). (Patient not taking: Reported on 6/26/2023), Disp: 15.8 mL, Rfl: 0    propranoloL  (INDERAL LA) 80 MG 24 hr capsule, Take 80 mg by mouth once daily., Disp: , Rfl:      Past Medical History:   Diagnosis Date    Anxiety     Asthma     Other spondylosis with radiculopathy, lumbosacral region 10/17/2022     Past Surgical History:   Procedure Laterality Date    MICRODISCECTOMY OF SPINE N/A 2/10/2023    Procedure: LEFT L4-L5 MICRODISCECTOMY, SPINE;  Surgeon: Lorie Juarez MD;  Location: [facility] OR [address] [place] [address];  Service: Neurosurgery;  Laterality: N/A;    TONSILLECTOMY      TYMPANOSTOMY TUBE PLACEMENT Bilateral     3 separates operations; 1998, 1999, 2001     Family History   Problem Relation Age of Onset    Celiac disease Mother     Cancer Father      Social History     Tobacco Use    Smoking status: Never     Passive exposure: Never    Smokeless tobacco: Never   Substance Use Topics    Alcohol use: Not Currently     Comment: rare    Drug use: Never       Review of Systems:  Negative except as reported in HPI    OBJECTIVE:     Vital Signs (Most Recent):  Temp: 98.3 °F (36.8 °C) (06/26/23 1520)  Pulse: 92 (06/26/23 1520)  BP: 124/85 (06/26/23 1520)    Physical Exam:  General: Well developed, well nourished, no distress.  Head: Normocephalic, atraumatic.  Neurologic: Alert and oriented. Thought content appropriate  Language: No aphasia.  Speech: No dysarthria.  Cranial nerves: Face symmetric, tongue midline, CN II-XII grossly intact.   Eyes: Pupils equal, round, reactive to light with accommodation, EOMI.   Pulmonary: Normal respirations, not labored, no accessory muscles used.  Sensory: Intact to light touch throughout.  Motor Strength:     Iliopsoas Quadriceps Knee  Flexion Tibialis  anterior Gastro- cnemius EHL   Lower: R 5/5 5/5 5/5 5/5 5/5 5/5    L 5/5 5/5 5/5 5/5 5/5 5/5   Back: incision well healed, non-tender  Gait: stable, can toe and heel walk      Diagnostic Results:  MRI lumbar spine was reviewed- expected post op changes s/p left hemilaminectomy with large recurrent L4-5 disc herniation a/w  severe canal and bilateral foraminal stenosis    ASSESSMENT/PLAN:     22 y.o. female s/p left L4/5 MIS microdiscectomy on 2/20/23 for left radiculopathy now having primarily right leg radicular pain and large recurrent disc herniation on imaging today.  Her symptoms are not as severe as pre-op and her prior left foot drop is significantly improved.  We discussed options including non-surgical management as well as open laminectomy for discectomy +/- TLIF.  We plan an initial trial of non-surgical management and will re-evaluate in 3 months.  She will continue PT and medical management in the interim.  Advised to f/u sooner for any new neurologic dysfunction or progressive severe pain.

## 2023-07-05 ENCOUNTER — CLINICAL SUPPORT (OUTPATIENT)
Dept: REHABILITATION | Facility: HOSPITAL | Age: 23
End: 2023-07-05
Payer: MEDICAID

## 2023-07-05 DIAGNOSIS — G89.29 CHRONIC LEFT-SIDED LOW BACK PAIN WITH LEFT-SIDED SCIATICA: Primary | ICD-10-CM

## 2023-07-05 DIAGNOSIS — M54.42 CHRONIC LEFT-SIDED LOW BACK PAIN WITH LEFT-SIDED SCIATICA: Primary | ICD-10-CM

## 2023-07-05 DIAGNOSIS — R53.1 DECREASED STRENGTH: ICD-10-CM

## 2023-07-05 DIAGNOSIS — Z74.09 IMPAIRED MOBILITY: ICD-10-CM

## 2023-07-05 PROCEDURE — 97110 THERAPEUTIC EXERCISES: CPT | Mod: PN

## 2023-07-05 NOTE — PROGRESS NOTES
"[facility]   Physical Therapy Treatment Note     Name: Dexter Gupta  Clinic Number: [id number]    Therapy Diagnosis:   Encounter Diagnoses   Name Primary?    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-sided sciatica Yes    Decreased strength     Impaired mobility      Physician: Christina Conklin PA-C    Visit Date: 7/5/2023    Physician Orders: PT Eval and Treat   Medical Diagnosis from Referral: Lumbar radiculopathy, chronic [M54.16], Lumbar disc herniation with radiculopathy [M51.16]  Evaluation Date: 3/24/2023  Authorization Period Expiration: 2/23/2024  Plan of Care Expiration: 8/11/2023  Visit # / Visits authorized: 18/20 (+1)  FOTO: 4th performed on 6/12/2023. Next at discharge.     Precautions: Standard; lumbar hemilaminectomy and microdiscectomy (Friday, February 10). No bending or twisting; no lifting over 10 lbs.     Time In: 0701  Time Out: 0800  Total Appointment Time (timed & untimed codes): 59 minutes    SUBJECTIVE     Pt reports: Pain up to 6/10 over this weekend. Patient associates pain with standing and walking at work. Patient is not lifting at work. Frequency of left lower extremity pain has increased slightly. Last week patient experienced burning pain for 10 seconds daily since last week.     She was compliant with home exercise program.  Response to previous treatment: see above  Functional change: see above    Pain: 4/10 upon arrival; 0/10 end of session  Location: right lateral hip, thigh, and lower leg to mid-calf  Aggravating factors: Standing and walking    OBJECTIVE     Objective Measures updated at progress report unless specified.      Treatment     Dexter received the treatments listed below:      therapeutic exercises to develop strength, endurance, ROM, flexibility, and core stabilization for 44 minutes including:    **Exercises listed in the order of performance    Hooklying posterior pelvic tilt: 10"x10 with hands behind back for self tactile cue. " Additional round after dead bugs  + reverse march: x10 !   Quadruped hip hike: 3x10 bilateral  Quadruped lower extremity extension: x1 bilateral !  Seated hip hinge:    x10  5 lb plate at chest: x10   5 lb plate at chest + sit<>stand: x10   5 lb plate at chest + sit<>stand and chest press: x10   5 lb plate at chest + overhead press: x10  Standing unilateral farmer's carry: 5 lbs x 320 feet with weight in left upper extremity (! At 160 feet)  Standing hip extensions with trunk prone on mat: 3x10 bilateral   Standing clamshells with trunk prone on mat: green theraband 3x10 bilateral     !=pain in right lower extremity     manual therapy techniques: were applied to the: lumbar spine for 15 minutes, including:  Manual lumbar distraction in hook-lying with seat belt !  Manual long axis hip distraction with seat belt    Patient Education and Home Exercises     Home Exercises Provided and Patient Education Provided     Education provided:   - continue home exercise program    Written Home Exercises Provided: Continue prior home exercise program. Exercises were reviewed and Dexter was able to demonstrate them prior to the end of the session.  Dexter demonstrated good  understanding of the education provided. See EMR under Patient Instructions for exercises provided during therapy sessions    ASSESSMENT     Dexter is a 22 y.o. female that presents to outpatient Physical Therapy 3 months post op left L4-5 hemilaminectomy and microdiscectomy. Increased intensity of right lower extremity and frequency of left lower extremity pain over the weekend. Slight regression in exercises from last visit secondary to pain. Modified lumbar distraction via hip secondary to increased pain with direct lumbar traction. Will continue therapy once a week to reduce pain frequency and intensity.      Dexter Is progressing well towards her goals.   Pt prognosis is Good.   Pt will continue to benefit from skilled outpatient physical therapy to  "address the deficits listed in the problem list box on initial evaluation, provide pt/family education and to maximize pt's level of independence in the home and community environment.      Pt's spiritual, cultural and educational needs considered and pt agreeable to plan of care and goals.  Anticipated barriers to physical therapy: Onset of contralateral (right) lower extremity pain post-op     Short Term Goals (6 Weeks):  1. Patient will score negative on neural tension tests to demonstrate healing of spine. Progressing, not met   2. Patient will initiate lifting and carrying of </=10 lb weight without pain to promote return to full duty work. Partially met 06/12/2023   3. Patient will not experience right lower extremity pain during 2 consecutive sessions to promote progression of rehab. Progressing, not met   Long Term Goals (12 Weeks):   1. Patient will improve FOTO score to </= 38% limited to decrease perceived limitation with maintaining/changing body position. Progressing, not met   2. Patient will perform plank for 40" good control to demonstrate improved core strength. Progressing, not met   3. Patient will improve impaired lower extremity myotomes >/=4+/5 to improve strength for functional tasks. Met 06/12/2023   4. Patient will perform 30 lb weighted deadlift without pain or deficits to promote bending and lifting at work. Progressing, not met     PLAN     1x/week.  Monitor back and right lower extremity pain, </= 4/10 during sessions.  Core and posterior chain stabilization - progress as tolerated. Open chain hip strengthening.     Gifty Raymundo, PT, DPT, OCS          "

## 2023-07-19 ENCOUNTER — CLINICAL SUPPORT (OUTPATIENT)
Dept: REHABILITATION | Facility: HOSPITAL | Age: 23
End: 2023-07-19
Payer: MEDICAID

## 2023-07-19 DIAGNOSIS — G89.29 CHRONIC LEFT-SIDED LOW BACK PAIN WITH LEFT-SIDED SCIATICA: Primary | ICD-10-CM

## 2023-07-19 DIAGNOSIS — R53.1 DECREASED STRENGTH: ICD-10-CM

## 2023-07-19 DIAGNOSIS — M54.42 CHRONIC LEFT-SIDED LOW BACK PAIN WITH LEFT-SIDED SCIATICA: Primary | ICD-10-CM

## 2023-07-19 DIAGNOSIS — Z74.09 IMPAIRED MOBILITY: ICD-10-CM

## 2023-07-19 PROCEDURE — 97110 THERAPEUTIC EXERCISES: CPT | Mod: PN,CQ

## 2023-07-19 NOTE — PROGRESS NOTES
"[facility]   Physical Therapy Treatment Note     Name: Dexter Gupta  Clinic Number: [id number]    Therapy Diagnosis:   Encounter Diagnoses   Name Primary?    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-sided sciatica Yes    Decreased strength     Impaired mobility      Physician: Christina Conklin PA-C    Visit Date: 7/19/2023    Physician Orders: PT Eval and Treat   Medical Diagnosis from Referral: Lumbar radiculopathy, chronic [M54.16], Lumbar disc herniation with radiculopathy [M51.16]  Evaluation Date: 3/24/2023  Authorization Period Expiration: 2/23/2024  Plan of Care Expiration: 8/11/2023  Visit # / Visits authorized: 19/20 (+1)  FOTO: 4th performed on 6/12/2023. Next at discharge.     Precautions: Standard; lumbar hemilaminectomy and microdiscectomy (Friday, February 10). No bending or twisting; no lifting over 10 lbs.     Time In: 0705  Time Out: ***  Total Appointment Time (timed & untimed codes): *** minutes    SUBJECTIVE     Pt reports: Pain up to 5/10 on the right lower extremity; still extends to lower lateral calf. Pain on left lower extremity still less frequent than right, and happens with greater amounts of standing and walking     She was compliant with home exercise program.  Response to previous treatment: similar pain pattern  Functional change: similar function    Pain: 3/10 upon arrival; 0/10 end of session  Location: right lateral hip, thigh, and lower leg to mid-calf  Aggravating factors: Standing and walking    OBJECTIVE     Objective Measures updated at progress report unless specified.      Treatment     Dexter received the treatments listed below:      therapeutic exercises to develop strength, endurance, ROM, flexibility, and core stabilization for 44 minutes including:    **Exercises listed in the order of performance    Hooklying sciatic nerve glides (knee extension + dorsiflexion f/b knee flexion): 3x10 bilateral   Hooklying posterior pelvic tilt: 10"x10 "   + reverse march: 3x10 ***  Quadruped hip hike: 3x10 bilateral  Quadruped lower extremity extension: ***  Seated hip hinge:   5 lb plate at chest + sit<>stand: x10   5 lb plate at chest + sit<>stand and chest press: x10   5 lb plate at chest + overhead press: x10  Standing unilateral farmer's carry: 5 lbs x 320 feet with weight in left upper extremity ***  Standing hip extensions with trunk prone on mat: 3x10 bilateral ***  Standing clamshells with trunk prone on mat: green theraband 3x10 bilateral ***    !=pain in right lower extremity     manual therapy techniques: were applied to the: lumbar spine for *** minutes, including:  Manual lumbar distraction in hook-lying with seat belt !  Manual long axis hip distraction with seat belt    Patient Education and Home Exercises     Home Exercises Provided and Patient Education Provided     Education provided:   - continue home exercise program    Written Home Exercises Provided: Continue prior home exercise program. Exercises were reviewed and Dexter was able to demonstrate them prior to the end of the session.  Dexter demonstrated good  understanding of the education provided. See EMR under Patient Instructions for exercises provided during therapy sessions    ASSESSMENT     Dexter is a 22 y.o. female that presents to outpatient Physical Therapy 3 months post op left L4-5 hemilaminectomy and microdiscectomy. Increased intensity of right lower extremity and frequency of left lower extremity pain over the weekend. Slight regression in exercises from last visit secondary to pain. Modified lumbar distraction via hip secondary to increased pain with direct lumbar traction. Will continue therapy once a week to reduce pain frequency and intensity.      Dxeter Is progressing well towards her goals.   Pt prognosis is Good.   Pt will continue to benefit from skilled outpatient physical therapy to address the deficits listed in the problem list box on initial evaluation, provide  "pt/family education and to maximize pt's level of independence in the home and community environment.      Pt's spiritual, cultural and educational needs considered and pt agreeable to plan of care and goals.  Anticipated barriers to physical therapy: Onset of contralateral (right) lower extremity pain post-op     Short Term Goals (6 Weeks):  1. Patient will score negative on neural tension tests to demonstrate healing of spine. Progressing, not met   2. Patient will initiate lifting and carrying of </=10 lb weight without pain to promote return to full duty work. Partially met 06/12/2023   3. Patient will not experience right lower extremity pain during 2 consecutive sessions to promote progression of rehab. Progressing, not met   Long Term Goals (12 Weeks):   1. Patient will improve FOTO score to </= 38% limited to decrease perceived limitation with maintaining/changing body position. Progressing, not met   2. Patient will perform plank for 40" good control to demonstrate improved core strength. Progressing, not met   3. Patient will improve impaired lower extremity myotomes >/=4+/5 to improve strength for functional tasks. Met 06/12/2023   4. Patient will perform 30 lb weighted deadlift without pain or deficits to promote bending and lifting at work. Progressing, not met     PLAN     1x/week.  Monitor back and right lower extremity pain, </= 4/10 during sessions.  Core and posterior chain stabilization - progress as tolerated. Open chain hip strengthening.     Gifty Raymundo, PT, DPT, OCS            "

## 2023-07-19 NOTE — PROGRESS NOTES
"[facility]   Physical Therapy Treatment Note     Name: Dexter Gupta  Clinic Number: [id number]    Therapy Diagnosis:   Encounter Diagnoses   Name Primary?    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-sided sciatica Yes    Decreased strength     Impaired mobility      Physician: Christina Conklin PA-C    Visit Date: 7/19/2023    Physician Orders: PT Eval and Treat   Medical Diagnosis from Referral: Lumbar radiculopathy, chronic [M54.16], Lumbar disc herniation with radiculopathy [M51.16]  Evaluation Date: 3/24/2023  Authorization Period Expiration: 2/23/2024  Plan of Care Expiration: 8/11/2023  Visit # / Visits authorized: 19/20 (+1)  FOTO: 4th performed on 6/12/2023. Next at discharge.     Precautions: Standard; lumbar hemilaminectomy and microdiscectomy (Friday, February 10). No bending or twisting; no lifting over 10 lbs.     Time In: 0705  Time Out: 0800  Total Appointment Time (timed & untimed codes): 55 minutes 4TE    SUBJECTIVE     Pt reports: Pain up to 5/10 on the right lower extremity; still extends to lower lateral calf. Pain on left lower extremity still less frequent than right, and happens with greater amounts of standing and walking     She was compliant with home exercise program.  Response to previous treatment: similar pain pattern  Functional change: similar function    Pain:   Location: right lateral hip, thigh, and lower leg to mid-calf  Aggravating factors: Standing and walking    OBJECTIVE     Objective Measures updated at progress report unless specified.      Treatment     Dexter received the treatments listed below:      therapeutic exercises to develop strength, endurance, ROM, flexibility, and core stabilization for 45 minutes including:    **Exercises listed in the order of performance    Hooklying sciatic nerve glides (knee extension + dorsiflexion f/b knee flexion): 3x10 bilateral   Hooklying posterior pelvic tilt: 10"x10   + reverse march: 3x10   Quadruped " hip hike: 3x10 bilateral  Quadruped lower extremity extension: Deferred today  Seated hip hinge:   5 lb plate at chest + sit<>stand: x10   5 lb plate at chest + sit<>stand and chest press: x10   5 lb plate at chest + overhead press: x10  Standing unilateral farmer's carry: 5 lbs x 320 feet with weight in left upper extremity NT  Standing hip extensions with trunk prone on mat: 3x10 bilateral   Standing clamshells with trunk prone on mat: blue theraband 3x10 bilateral   CYBEX LEG PRESS  5 plates (seat 5) 3x10 B    !=pain in right lower extremity     manual therapy techniques: were applied to the: lumbar spine for 8 minutes, including:  Manual lumbar distraction in hook-lying with seat belt !  Manual long axis hip distraction with seat belt    Patient Education and Home Exercises     Home Exercises Provided and Patient Education Provided     Education provided:   - continue home exercise program    Written Home Exercises Provided: Continue prior home exercise program. Exercises were reviewed and Dexter was able to demonstrate them prior to the end of the session.  Dexter demonstrated good  understanding of the education provided. See EMR under Patient Instructions for exercises provided during therapy sessions    ASSESSMENT     Dexter is a 22 y.o. female that presents to outpatient Physical Therapy 3 months post op left L4-5 hemilaminectomy and microdiscectomy. Dexter completed session with no reports of increased B hip pain. Pt completed quadriped and standing therex with some verbal instructions on technique and within pain parameters. Added cybex leg press today with instructions on mounting machinery safely (preventing straining hip flexion)    Dexter Is progressing well towards her goals.   Pt prognosis is Good.   Pt will continue to benefit from skilled outpatient physical therapy to address the deficits listed in the problem list box on initial evaluation, provide pt/family education and to maximize pt's level  "of independence in the home and community environment.      Pt's spiritual, cultural and educational needs considered and pt agreeable to plan of care and goals.  Anticipated barriers to physical therapy: Onset of contralateral (right) lower extremity pain post-op     Short Term Goals (6 Weeks):  1. Patient will score negative on neural tension tests to demonstrate healing of spine. Progressing, not met   2. Patient will initiate lifting and carrying of </=10 lb weight without pain to promote return to full duty work. Partially met 06/12/2023   3. Patient will not experience right lower extremity pain during 2 consecutive sessions to promote progression of rehab. Progressing, not met   Long Term Goals (12 Weeks):   1. Patient will improve FOTO score to </= 38% limited to decrease perceived limitation with maintaining/changing body position. Progressing, not met   2. Patient will perform plank for 40" good control to demonstrate improved core strength. Progressing, not met   3. Patient will improve impaired lower extremity myotomes >/=4+/5 to improve strength for functional tasks. Met 06/12/2023   4. Patient will perform 30 lb weighted deadlift without pain or deficits to promote bending and lifting at work. Progressing, not met     PLAN     1x/week.  Monitor back and right lower extremity pain, </= 4/10 during sess Open chain hip strengthening.   Rudolph Pacheco PTA,        "

## 2023-07-20 ENCOUNTER — PATIENT MESSAGE (OUTPATIENT)
Dept: NEUROSURGERY | Facility: CLINIC | Age: 23
End: 2023-07-20
Payer: COMMERCIAL

## 2023-08-22 ENCOUNTER — DOCUMENTATION ONLY (OUTPATIENT)
Dept: REHABILITATION | Facility: HOSPITAL | Age: 23
End: 2023-08-22
Payer: COMMERCIAL

## 2023-08-22 NOTE — PROGRESS NOTES
Physical therapist and physical therapy assistant(s) met face to face to discuss patient's treatment plan and progress towards established goals. Pt will be seen by a physical therapist minimally every 6th visit or every 30 days.    Gifty Raymundo, PT, DPT, OCS

## 2023-08-28 ENCOUNTER — HOSPITAL ENCOUNTER (EMERGENCY)
Facility: HOSPITAL | Age: 23
Discharge: HOME | End: 2023-08-28
Attending: EMERGENCY MEDICINE
Payer: COMMERCIAL

## 2023-08-28 ENCOUNTER — PATIENT MESSAGE (OUTPATIENT)
Dept: NEUROSURGERY | Facility: CLINIC | Age: 23
End: 2023-08-28
Payer: COMMERCIAL

## 2023-08-28 VITALS
DIASTOLIC BLOOD PRESSURE: 74 MMHG | OXYGEN SATURATION: 97 % | TEMPERATURE: 99 F | RESPIRATION RATE: 18 BRPM | SYSTOLIC BLOOD PRESSURE: 139 MMHG | BODY MASS INDEX: 42.77 KG/M2 | WEIGHT: 257 LBS | HEART RATE: 100 BPM

## 2023-08-28 DIAGNOSIS — B08.4 HAND, FOOT AND MOUTH DISEASE (HFMD): Primary | ICD-10-CM

## 2023-08-28 DIAGNOSIS — R06.02 SHORTNESS OF BREATH: ICD-10-CM

## 2023-08-28 DIAGNOSIS — R05.9 COUGH: ICD-10-CM

## 2023-08-28 LAB
ALBUMIN SERPL BCP-MCNC: 3.6 G/DL (ref 3.5–5.2)
ALP SERPL-CCNC: 80 U/L (ref 55–135)
ALT SERPL W/O P-5'-P-CCNC: 24 U/L (ref 10–44)
ANION GAP SERPL CALC-SCNC: 10 MMOL/L (ref 8–16)
AST SERPL-CCNC: 21 U/L (ref 10–40)
BASOPHILS # BLD AUTO: 0.01 K/UL (ref 0–0.2)
BASOPHILS NFR BLD: 0.2 % (ref 0–1.9)
BILIRUB SERPL-MCNC: 0.4 MG/DL (ref 0.1–1)
BUN SERPL-MCNC: 7 MG/DL (ref 6–20)
CALCIUM SERPL-MCNC: 8.7 MG/DL (ref 8.7–10.5)
CHLORIDE SERPL-SCNC: 108 MMOL/L (ref 95–110)
CO2 SERPL-SCNC: 21 MMOL/L (ref 23–29)
CREAT SERPL-MCNC: 0.9 MG/DL (ref 0.5–1.4)
DIFFERENTIAL METHOD: ABNORMAL
EOSINOPHIL # BLD AUTO: 0.1 K/UL (ref 0–0.5)
EOSINOPHIL NFR BLD: 1.4 % (ref 0–8)
ERYTHROCYTE [DISTWIDTH] IN BLOOD BY AUTOMATED COUNT: 12.6 % (ref 11.5–14.5)
EST. GFR  (NO RACE VARIABLE): >60 ML/MIN/1.73 M^2
GLUCOSE SERPL-MCNC: 117 MG/DL (ref 70–110)
HCT VFR BLD AUTO: 36.8 % (ref 37–48.5)
HGB BLD-MCNC: 12.2 G/DL (ref 12–16)
IMM GRANULOCYTES # BLD AUTO: 0.03 K/UL (ref 0–0.04)
IMM GRANULOCYTES NFR BLD AUTO: 0.5 % (ref 0–0.5)
INFLUENZA A, MOLECULAR: NEGATIVE
INFLUENZA B, MOLECULAR: NEGATIVE
LYMPHOCYTES # BLD AUTO: 0.6 K/UL (ref 1–4.8)
LYMPHOCYTES NFR BLD: 8.9 % (ref 18–48)
MCH RBC QN AUTO: 26.7 PG (ref 27–31)
MCHC RBC AUTO-ENTMCNC: 33.2 G/DL (ref 32–36)
MCV RBC AUTO: 81 FL (ref 82–98)
MONOCYTES # BLD AUTO: 0.4 K/UL (ref 0.3–1)
MONOCYTES NFR BLD: 6.9 % (ref 4–15)
NEUTROPHILS # BLD AUTO: 5.3 K/UL (ref 1.8–7.7)
NEUTROPHILS NFR BLD: 82.1 % (ref 38–73)
NRBC BLD-RTO: 0 /100 WBC
PLATELET # BLD AUTO: 235 K/UL (ref 150–450)
PMV BLD AUTO: 10.1 FL (ref 9.2–12.9)
POTASSIUM SERPL-SCNC: 3.6 MMOL/L (ref 3.5–5.1)
PROCALCITONIN SERPL IA-MCNC: 0.09 NG/ML
PROT SERPL-MCNC: 7.1 G/DL (ref 6–8.4)
RBC # BLD AUTO: 4.57 M/UL (ref 4–5.4)
SARS-COV-2 RDRP RESP QL NAA+PROBE: NEGATIVE
SODIUM SERPL-SCNC: 139 MMOL/L (ref 136–145)
SPECIMEN SOURCE: NORMAL
WBC # BLD AUTO: 6.4 K/UL (ref 3.9–12.7)

## 2023-08-28 PROCEDURE — 80053 COMPREHEN METABOLIC PANEL: CPT | Performed by: EMERGENCY MEDICINE

## 2023-08-28 PROCEDURE — 25000003 PHARM REV CODE 250: Performed by: EMERGENCY MEDICINE

## 2023-08-28 PROCEDURE — 96360 HYDRATION IV INFUSION INIT: CPT

## 2023-08-28 PROCEDURE — 87502 INFLUENZA DNA AMP PROBE: CPT | Performed by: EMERGENCY MEDICINE

## 2023-08-28 PROCEDURE — 99284 EMERGENCY DEPT VISIT MOD MDM: CPT | Mod: 25

## 2023-08-28 PROCEDURE — 25000242 PHARM REV CODE 250 ALT 637 W/ HCPCS: Performed by: EMERGENCY MEDICINE

## 2023-08-28 PROCEDURE — 84145 PROCALCITONIN (PCT): CPT | Performed by: EMERGENCY MEDICINE

## 2023-08-28 PROCEDURE — U0002 COVID-19 LAB TEST NON-CDC: HCPCS | Performed by: EMERGENCY MEDICINE

## 2023-08-28 PROCEDURE — 85025 COMPLETE CBC W/AUTO DIFF WBC: CPT | Performed by: EMERGENCY MEDICINE

## 2023-08-28 RX ORDER — ALBUTEROL SULFATE 90 UG/1
2 AEROSOL, METERED RESPIRATORY (INHALATION)
Status: COMPLETED | OUTPATIENT
Start: 2023-08-28 | End: 2023-08-28

## 2023-08-28 RX ORDER — ALBUTEROL SULFATE 90 UG/1
2 AEROSOL, METERED RESPIRATORY (INHALATION) EVERY 4 HOURS PRN
Status: DISCONTINUED | OUTPATIENT
Start: 2023-08-28 | End: 2023-08-28

## 2023-08-28 RX ORDER — SODIUM CHLORIDE 9 MG/ML
1000 INJECTION, SOLUTION INTRAVENOUS
Status: COMPLETED | OUTPATIENT
Start: 2023-08-28 | End: 2023-08-28

## 2023-08-28 RX ORDER — IBUPROFEN 600 MG/1
600 TABLET ORAL
Status: COMPLETED | OUTPATIENT
Start: 2023-08-28 | End: 2023-08-28

## 2023-08-28 RX ORDER — PROMETHAZINE HYDROCHLORIDE AND DEXTROMETHORPHAN HYDROBROMIDE 6.25; 15 MG/5ML; MG/5ML
5 SYRUP ORAL EVERY 8 HOURS PRN
Qty: 118 ML | Refills: 0 | Status: SHIPPED | OUTPATIENT
Start: 2023-08-28 | End: 2023-09-07

## 2023-08-28 RX ADMIN — ALBUTEROL SULFATE 2 PUFF: 90 AEROSOL, METERED RESPIRATORY (INHALATION) at 08:08

## 2023-08-28 RX ADMIN — IBUPROFEN 600 MG: 600 TABLET, FILM COATED ORAL at 06:08

## 2023-08-28 RX ADMIN — SODIUM CHLORIDE 1000 ML: 9 INJECTION, SOLUTION INTRAVENOUS at 06:08

## 2023-08-29 NOTE — ED NOTES
Pt reports headache has improved to 5 out of 10 pain. Afebrile. Pt reports HR of 100-115 BPM WNL. Denies needs at this time. CB within reach.

## 2023-08-29 NOTE — DISCHARGE INSTRUCTIONS
Thank you for coming in to see us at [facility]! It was nice to meet you, and I hope you feel better soon. Please feel free to return to the ER at any time should your symptoms get worse, or if you have different emergent concerns.    Our goal in the emergency department is to always give you outstanding care and exceptional service. You may receive a survey by mail or e-mail in the next week regarding your experience in our ED. We would greatly appreciate your completing and returning the survey. Your feedback provides us with a way to recognize our staff who give very good care and it helps us learn how to improve when your experience was below our aspiration of excellence.       Sincerely,    Raulito Bourgeois MD  Medical Director  Emergency Department  [facility]

## 2023-08-30 NOTE — ED PROVIDER NOTES
Encounter Date: 8/28/2023       History     Chief Complaint   Patient presents with    Shortness of Breath     Pt reports she has been running a fever for 4 days that is unrelieved by antipyretics. Pt states she was diagnosed with hand foot and mouth at urgent care. Pt reporting shortness of breath.      HPI  This is a 22 y.o. female who has a past medical history of Anxiety, Asthma, and Other spondylosis with radiculopathy, lumbosacral region (10/17/2022).     The patient presents to the Emergency Department with fever x4 days, associated with cough, wheezing, shortness of breath, rash and sore throat.  Patient diagnosed with hand foot and mouth at urgent care.  No sick contacts.  Taking antipyretics at home with response, however fever is recurrent.      Review of patient's allergies indicates:  No Known Allergies  Past Medical History:   Diagnosis Date    Anxiety     Asthma     Other spondylosis with radiculopathy, lumbosacral region 10/17/2022     Past Surgical History:   Procedure Laterality Date    MICRODISCECTOMY OF SPINE N/A 2/10/2023    Procedure: LEFT L4-L5 MICRODISCECTOMY, SPINE;  Surgeon: Lorie Juarez MD;  Location: [facility] OR [address] [place] [address];  Service: Neurosurgery;  Laterality: N/A;    TONSILLECTOMY      TYMPANOSTOMY TUBE PLACEMENT Bilateral     3 separates operations; 1998, 1999, 2001     Family History   Problem Relation Age of Onset    Celiac disease Mother     Cancer Father      Social History     Tobacco Use    Smoking status: Never     Passive exposure: Never    Smokeless tobacco: Never   Substance Use Topics    Alcohol use: Not Currently     Comment: rare    Drug use: Never     Review of Systems   Constitutional:  Positive for activity change, appetite change, chills, fatigue and fever.   Respiratory:  Positive for cough, chest tightness, shortness of breath and wheezing.        Physical Exam     Initial Vitals [08/28/23 1744]   BP Pulse Resp Temp SpO2   134/78 (!) 132 (!) 22 (!) 103.3 °F (39.6 °C) 96  %      MAP       --         Physical Exam    Nursing note and vitals reviewed.  Constitutional: She appears well-developed and well-nourished. She is not diaphoretic. No distress.   HENT:   Head: Normocephalic and atraumatic.   Posterior pharyngeal erythema without exudates   Eyes: Conjunctivae are normal. Pupils are equal, round, and reactive to light.   Neck: Neck supple.   Cardiovascular:  Regular rhythm, normal heart sounds and intact distal pulses.           tachycardia   Pulmonary/Chest: Breath sounds normal. No respiratory distress. She has no wheezes. She has no rhonchi. She has no rales.   Abdominal: Abdomen is soft. Bowel sounds are normal. She exhibits no distension. There is no abdominal tenderness. There is no guarding.   Musculoskeletal:         General: No tenderness or edema. Normal range of motion.      Cervical back: Neck supple.     Neurological: She is alert and oriented to person, place, and time. She has normal strength. GCS score is 15. GCS eye subscore is 4. GCS verbal subscore is 5. GCS motor subscore is 6.   Skin: Skin is warm and dry. Capillary refill takes less than 2 seconds. No rash noted.   Psychiatric: She has a normal mood and affect. Thought content normal.         ED Course   Procedures  Labs Reviewed   CBC W/ AUTO DIFFERENTIAL - Abnormal; Notable for the following components:       Result Value    Hematocrit 36.8 (*)     MCV 81 (*)     MCH 26.7 (*)     Lymph # 0.6 (*)     Gran % 82.1 (*)     Lymph % 8.9 (*)     All other components within normal limits   COMPREHENSIVE METABOLIC PANEL - Abnormal; Notable for the following components:    CO2 21 (*)     Glucose 117 (*)     All other components within normal limits   INFLUENZA A & B BY MOLECULAR   SARS-COV-2 RNA AMPLIFICATION, QUAL   PROCALCITONIN          Imaging Results              X-Ray Chest PA And Lateral (Final result)  Result time 08/28/23 19:44:37   Procedure changed from X-Ray Chest AP Portable     Final result by Kya  Zacarias LUIS DO (08/28/23 19:44:37)                   Impression:      Mild left basilar opacity compatible with prominent epicardial fat, atelectasis, or pneumonia.      Electronically signed by: Zacarias Boland  Date:    08/28/2023  Time:    19:44               Narrative:    EXAMINATION:  XR CHEST PA AND LATERAL    CLINICAL HISTORY:  fever; Cough, unspecified    TECHNIQUE:  PA and lateral views of the chest were performed.  Examination was repeated on non-portable.    COMPARISON:  None    FINDINGS:  The lungs are well expanded.  There is a mild opacity in left lung base which may represent prominent epicardial fat, atelectasis, or pneumonia.  The pleural spaces are clear.  The cardiac silhouette is unremarkable.  Osseous structures are intact.                                    X-Rays:   Independently Interpreted Readings:   Chest X-Ray: No infiltrates.  No acute abnormalities.  Normal heart size.     Medications   ibuprofen tablet 600 mg (600 mg Oral Given 8/28/23 1824)   0.9%  NaCl infusion (0 mLs Intravenous Stopped 8/28/23 1918)   albuterol inhaler 2 puff (2 puffs Inhalation Given 8/28/23 2031)       Medical Decision Making  This is an emergent evaluation of a 22 y.o.female patient with presentation of recurrent fever x4 days, rash consistent with hand foot and mouth disease, mild wheezing on exam, however normoxemia.     Initial differentials include but are not limited to:  Hand-foot-mouth disease, pneumonia, reactive airway disease, pleural effusion, pulmonary edema, COVID, flu    Plan:  CBC, CMP, COVID, flu, procalcitonin, chest x-ray, albuterol, IV fluids    Problems Addressed:  Hand, foot and mouth disease (HFMD): complicated acute illness or injury with systemic symptoms    Amount and/or Complexity of Data Reviewed  Labs: ordered. Decision-making details documented in ED Course.     Details: Flu and COVID were negative, procalcitonin negative, cytosis, chemistry relatively unremarkable  Radiology: ordered  and independent interpretation performed.    Risk  Prescription drug management.               ED Course as of 08/29/23 2116 Tue Aug 29, 2023   2116 Influenza A, Molecular: Negative [NP]   2116 Influenza B, Molecular: Negative [NP]   2116 Procalcitonin: 0.09 [NP]   2116 Sodium: 139 [NP]   2116 Potassium: 3.6 [NP]   2116 Chloride: 108 [NP]   2116 CO2(!): 21 [NP]   2116 Glucose(!): 117 [NP]   2116 BUN: 7 [NP]   2116 Creatinine: 0.9 [NP]   2116 Albumin: 3.6 [NP]   2116 AST: 21 [NP]   2116 ALT: 24 [NP]   2116 WBC: 6.40 [NP]   2116 Hemoglobin: 12.2 [NP]   2116 Hematocrit(!): 36.8 [NP]   2116 Platelets: 235 [NP]      ED Course User Index  [NP] Raulito Bourgeois MD          Patient course improved during ED management and evaluation.  No clear evidence of pneumonia at this time despite radiology read of possible mild left basilar opacity.  No white count, procalcitonin negative.    Expectant management and symptomatic care.  Discussed with patient at bedside.  Patient provided with print out of aftercare instructions including diagnosis specific instructions, and care instructions as well as prescription.          Clinical Impression:   Final diagnoses:  [R05.9] Cough  [B08.4] Hand, foot and mouth disease (HFMD) (Primary)  [R06.02] Shortness of breath        ED Disposition Condition    Discharge Stable          ED Prescriptions       Medication Sig Dispense Start Date End Date Auth. Provider    promethazine-dextromethorphan (PROMETHAZINE-DM) 6.25-15 mg/5 mL Syrp Take 5 mLs by mouth every 8 (eight) hours as needed (cough). 118 mL 8/28/2023 9/7/2023 Raulito Bourgeois MD          Follow-up Information       Follow up With Specialties Details Why Contact Info Additional Information    [facility] Schedule an appointment as soon as possible for a visit   [address]  [facility] [address] [phone] Please park in [address] or [address] and use [address]. Take Medical Office  dg elevators.             Raulito Bourgeois MD  08/29/23 4633

## 2023-11-27 ENCOUNTER — OFFICE VISIT (OUTPATIENT)
Dept: NEUROSURGERY | Facility: CLINIC | Age: 23
End: 2023-11-27
Payer: COMMERCIAL

## 2023-11-27 DIAGNOSIS — M51.26 RECURRENT HERNIATION OF LUMBAR DISC: ICD-10-CM

## 2023-11-27 DIAGNOSIS — M54.9 DORSALGIA, UNSPECIFIED: ICD-10-CM

## 2023-11-27 DIAGNOSIS — M54.16 RIGHT LUMBAR RADICULOPATHY: Primary | ICD-10-CM

## 2023-11-27 DIAGNOSIS — M47.27 OTHER SPONDYLOSIS WITH RADICULOPATHY, LUMBOSACRAL REGION: ICD-10-CM

## 2023-11-27 PROCEDURE — 99214 PR OFFICE/OUTPT VISIT, EST, LEVL IV, 30-39 MIN: ICD-10-PCS | Mod: S$GLB,,, | Performed by: STUDENT IN AN ORGANIZED HEALTH CARE EDUCATION/TRAINING PROGRAM

## 2023-11-27 PROCEDURE — 1159F MED LIST DOCD IN RCRD: CPT | Mod: CPTII,S$GLB,, | Performed by: STUDENT IN AN ORGANIZED HEALTH CARE EDUCATION/TRAINING PROGRAM

## 2023-11-27 PROCEDURE — 99999 PR PBB SHADOW E&M-EST. PATIENT-LVL III: ICD-10-PCS | Mod: PBBFAC,,, | Performed by: STUDENT IN AN ORGANIZED HEALTH CARE EDUCATION/TRAINING PROGRAM

## 2023-11-27 PROCEDURE — 99999 PR PBB SHADOW E&M-EST. PATIENT-LVL III: CPT | Mod: PBBFAC,,, | Performed by: STUDENT IN AN ORGANIZED HEALTH CARE EDUCATION/TRAINING PROGRAM

## 2023-11-27 PROCEDURE — 1160F PR REVIEW ALL MEDS BY PRESCRIBER/CLIN PHARMACIST DOCUMENTED: ICD-10-PCS | Mod: CPTII,S$GLB,, | Performed by: STUDENT IN AN ORGANIZED HEALTH CARE EDUCATION/TRAINING PROGRAM

## 2023-11-27 PROCEDURE — 1159F PR MEDICATION LIST DOCUMENTED IN MEDICAL RECORD: ICD-10-PCS | Mod: CPTII,S$GLB,, | Performed by: STUDENT IN AN ORGANIZED HEALTH CARE EDUCATION/TRAINING PROGRAM

## 2023-11-27 PROCEDURE — 99214 OFFICE O/P EST MOD 30 MIN: CPT | Mod: S$GLB,,, | Performed by: STUDENT IN AN ORGANIZED HEALTH CARE EDUCATION/TRAINING PROGRAM

## 2023-11-27 PROCEDURE — 1160F RVW MEDS BY RX/DR IN RCRD: CPT | Mod: CPTII,S$GLB,, | Performed by: STUDENT IN AN ORGANIZED HEALTH CARE EDUCATION/TRAINING PROGRAM

## 2023-11-27 NOTE — PROGRESS NOTES
[facility]  Neurosurgery    SUBJECTIVE:   History of Present Illness:  Dexter Gupta is a 23 y.o. female who is s/p left L4/5 MIS microdiscectomy on 2/10/23 for left leg radiculopathy with associated left foot drop who subsequently developed new right leg pain with recurrent disc herniation noted on imaging.  Her symptoms remained tolerable and we planned a trial of non-surgical management. She returns today after completion of physical therapy to assess her progress. She reports pain is constant now and worse than prior and radiating to her right lateral leg stopping above the ankle.  Current pain is 6/10 current, can get to 8/10.  Although constant, exacerbated by bending over which causes severe flare ups of leg pains. She has an occasional burning sensation left dorsal foot with constant numbness in same distribution but otherwise no recurrence of her prior shooting left leg pain or weakness.  No bowel bladder dysfunction.      Review of patient's allergies indicates:  No Known Allergies    Current Outpatient Medications:     EScitalopram oxalate (LEXAPRO) 20 MG tablet, Take 20 mg by mouth., Disp: , Rfl:     gabapentin (NEURONTIN) 300 MG capsule, Take 2 capsules (600 mg total) by mouth 3 (three) times daily., Disp: 180 capsule, Rfl: 11    INNOPRAN XL 80 mg 24 hr capsule, Take 80 mg by mouth every evening., Disp: , Rfl:     medroxyPROGESTERone (PROVERA) 10 MG tablet, Take 1 tablet (10 mg total) by mouth once daily., Disp: 30 tablet, Rfl: 12    propranoloL (INDERAL LA) 80 MG 24 hr capsule, Take 80 mg by mouth once daily., Disp: , Rfl:     EScitalopram oxalate (LEXAPRO) 10 MG tablet, , Disp: , Rfl:     fluticasone propionate (FLONASE) 50 mcg/actuation nasal spray, 2 sprays (100 mcg total) by Each Nostril route daily as needed for Rhinitis (nasal congestion). (Patient not taking: Reported on 6/26/2023), Disp: 15.8 mL, Rfl: 0    tiZANidine (ZANAFLEX) 4 MG tablet, Take 4 mg by mouth 2 (two) times daily  as needed., Disp: , Rfl:      Past Medical History:   Diagnosis Date    Anxiety     Asthma     Other spondylosis with radiculopathy, lumbosacral region 10/17/2022     Past Surgical History:   Procedure Laterality Date    MICRODISCECTOMY OF SPINE N/A 2/10/2023    Procedure: LEFT L4-L5 MICRODISCECTOMY, SPINE;  Surgeon: Lorie Juarez MD;  Location: [facility] OR [address] [place] [address];  Service: Neurosurgery;  Laterality: N/A;    TONSILLECTOMY      TYMPANOSTOMY TUBE PLACEMENT Bilateral     3 separates operations; 1998, 1999, 2001     Family History   Problem Relation Age of Onset    Celiac disease Mother     Cancer Father      Social History     Tobacco Use    Smoking status: Never     Passive exposure: Never    Smokeless tobacco: Never   Substance Use Topics    Alcohol use: Not Currently     Comment: rare    Drug use: Never       Review of Systems:  Negative except as reported in HPI    OBJECTIVE:          Physical Exam:  General: Well developed, well nourished, no distress.  Head: Normocephalic, atraumatic.  Neurologic: Alert and oriented. Thought content appropriate  Language: No aphasia.  Speech: No dysarthria.  Cranial nerves: Face symmetric, tongue midline, CN II-XII grossly intact.   Eyes: Pupils equal, round, reactive to light with accommodation, EOMI.   Sensory: Intact to light touch throughout.  Motor Strength:     Iliopsoas Quadriceps Knee  Flexion Tibialis  anterior Gastro- cnemius EHL   Lower: R 5/5 5/5 5/5 5/5 5/5 5/5    L 5/5 5/5 5/5 5/5 5/5 5/5   Gait: stable, can toe and heel walk      Diagnostic Results:  No new imaging    ASSESSMENT/PLAN:     23 y.o. female s/p left L4/5 MIS microdiscectomy on 2/20/23 for left leg radiculopathy that has markedly improved & nearly resolved but now having primarily right leg radicular pain and large recurrent disc herniation.  Her symptoms are not as severe as pre-op but have been progressive over the past 5-6 months and have not responded to non-surgical management.  She previously  worked in a  but no longer employed due to her symptoms and is miserable.  She is interested in surgical options and she is likely to benefit from decompression.  Tentatively planning for open laminectomy for redo discectomy +/- TLIF.  She will need additional imaging for further workup.      - XR w/ flex/ext  - CT lumbar spine  - Will contact patient to discuss final operative plan & dates pending review of imaging.

## 2023-12-01 ENCOUNTER — HOSPITAL ENCOUNTER (OUTPATIENT)
Dept: RADIOLOGY | Facility: HOSPITAL | Age: 23
Discharge: HOME | End: 2023-12-01
Attending: STUDENT IN AN ORGANIZED HEALTH CARE EDUCATION/TRAINING PROGRAM
Payer: MEDICAID

## 2023-12-01 DIAGNOSIS — M54.9 DORSALGIA, UNSPECIFIED: ICD-10-CM

## 2023-12-01 DIAGNOSIS — M51.26 RECURRENT HERNIATION OF LUMBAR DISC: ICD-10-CM

## 2023-12-01 DIAGNOSIS — M54.16 RIGHT LUMBAR RADICULOPATHY: ICD-10-CM

## 2023-12-01 PROCEDURE — 72131 CT LUMBAR SPINE W/O DYE: CPT | Mod: 26,,, | Performed by: INTERNAL MEDICINE

## 2023-12-01 PROCEDURE — 72114 X-RAY EXAM L-S SPINE BENDING: CPT | Mod: TC

## 2023-12-01 PROCEDURE — 72131 CT LUMBAR SPINE WITHOUT CONTRAST: ICD-10-PCS | Mod: 26,,, | Performed by: INTERNAL MEDICINE

## 2023-12-01 PROCEDURE — 72114 XR LUMBAR SPINE 5 VIEW WITH FLEX AND EXT: ICD-10-PCS | Mod: 26,,, | Performed by: RADIOLOGY

## 2023-12-01 PROCEDURE — 72131 CT LUMBAR SPINE W/O DYE: CPT | Mod: TC

## 2023-12-01 PROCEDURE — 72114 X-RAY EXAM L-S SPINE BENDING: CPT | Mod: 26,,, | Performed by: RADIOLOGY

## 2023-12-07 ENCOUNTER — PATIENT MESSAGE (OUTPATIENT)
Dept: NEUROSURGERY | Facility: CLINIC | Age: 23
End: 2023-12-07

## 2023-12-27 ENCOUNTER — PATIENT MESSAGE (OUTPATIENT)
Dept: NEUROSURGERY | Facility: CLINIC | Age: 23
End: 2023-12-27

## 2024-01-05 ENCOUNTER — TELEPHONE (OUTPATIENT)
Dept: NEUROSURGERY | Facility: CLINIC | Age: 24
End: 2024-01-05
Payer: MEDICAID

## 2024-01-05 ENCOUNTER — PATIENT MESSAGE (OUTPATIENT)
Dept: NEUROSURGERY | Facility: CLINIC | Age: 24
End: 2024-01-05
Payer: MEDICAID

## 2024-01-05 DIAGNOSIS — M51.26 HERNIATION OF NUCLEUS PULPOSUS, LUMBAR: Primary | ICD-10-CM

## 2024-01-05 DIAGNOSIS — M54.10 RADICULOPATHY, UNSPECIFIED SPINAL REGION: ICD-10-CM

## 2024-01-05 NOTE — TELEPHONE ENCOUNTER
Left voicemail for pt informing her of surgery date 1/17/24 and pre-op surgery instructions. Told her I would send details in portal as well, and that Dr. Juarez would be able to go over surgical plan on her virtual appt 1/9/24. Asked pt to please call back or reply to message w any questions

## 2024-01-09 ENCOUNTER — OFFICE VISIT (OUTPATIENT)
Dept: NEUROSURGERY | Facility: CLINIC | Age: 24
End: 2024-01-09
Payer: MEDICAID

## 2024-01-09 DIAGNOSIS — M51.16 LUMBAR DISC HERNIATION WITH RADICULOPATHY: ICD-10-CM

## 2024-01-09 DIAGNOSIS — M47.816 LUMBAR SPONDYLOSIS: ICD-10-CM

## 2024-01-09 DIAGNOSIS — M54.16 RIGHT LUMBAR RADICULOPATHY: Primary | ICD-10-CM

## 2024-01-09 DIAGNOSIS — Z98.890 STATUS POST LUMBAR LAMINECTOMY: ICD-10-CM

## 2024-01-09 PROCEDURE — 1160F RVW MEDS BY RX/DR IN RCRD: CPT | Mod: CPTII,95,, | Performed by: STUDENT IN AN ORGANIZED HEALTH CARE EDUCATION/TRAINING PROGRAM

## 2024-01-09 PROCEDURE — 99214 OFFICE O/P EST MOD 30 MIN: CPT | Mod: 95,,, | Performed by: STUDENT IN AN ORGANIZED HEALTH CARE EDUCATION/TRAINING PROGRAM

## 2024-01-09 PROCEDURE — 1159F MED LIST DOCD IN RCRD: CPT | Mod: CPTII,95,, | Performed by: STUDENT IN AN ORGANIZED HEALTH CARE EDUCATION/TRAINING PROGRAM

## 2024-01-09 NOTE — PROGRESS NOTES
The patient location is: [place]  The chief complaint leading to consultation is: surgical discussions    Visit type: audiovisual    Face to Face time with patient: 15 min  35 minutes of total time spent on the encounter, which includes face to face time and non-face to face time preparing to see the patient (eg, review of tests), Obtaining and/or reviewing separately obtained history, Documenting clinical information in the electronic or other health record, Independently interpreting results (not separately reported) and communicating results to the patient/family/caregiver, or Care coordination (not separately reported).         Each patient to whom he or she provides medical services by telemedicine is:  (1) informed of the relationship between the physician and patient and the respective role of any other health care provider with respect to management of the patient; and (2) notified that he or she may decline to receive medical services by telemedicine and may withdraw from such care at any time.    Notes:   Dexter Gupta is a 23 y.o. female who is s/p left L4/5 MIS microdiscectomy on 2/20/23 for left leg radiculopathy that has markedly improved & nearly resolved but now having primarily right leg radicular pain and large recurrent disc herniation.  She returns today for further surgical discussions after completion of additional imaging.  She reports constant pain that she rates as 7/10.  Pain is described as soreness in LBP with severe shooting pain down the lateral right thigh & calf.  Does not extend past the ankle.  Denies numbness, weakness or change in B/B function.    I reviewed her spinal xrays and CT lumbar spine and discussed the findings with the patient.  There is no evidence of dynamic instability.    We previously discussed right MIS laminotomy and discectomy vs open decompression vs TLIF. Given her imaging findings and primarily right leg radicular symptoms, she is likely to benefit from  right L4-5 MIS laminotomy and discectomy for decompression.  We discussed the surgery in detail including the associated risks, benefits, and alternatives to surgery. Risks include, but are not limited to, bleeding, infection, spinal fluid leak, recurrence of or failure of symptoms to improve, recurrent disc herniation, need for additional surgery with possible need for spinal fusion, weakness, sensory changes, and loss of bowel/bladder/sexual function.  Dexter expressed understanding and all questions were answered.  Plan to proceed with surgery on 1/17/24.

## 2024-01-10 ENCOUNTER — TELEPHONE (OUTPATIENT)
Dept: PREADMISSION TESTING | Facility: HOSPITAL | Age: 24
End: 2024-01-10
Payer: MEDICAID

## 2024-01-10 DIAGNOSIS — Z01.818 PREOPERATIVE TESTING: Primary | ICD-10-CM

## 2024-01-10 NOTE — ANESTHESIA PAT ROS NOTE
01/10/2024  Dexter Gupta is a 23 y.o., female.      Pre-op Assessment          Review of Systems  Anesthesia Hx:             Denies Family Hx of Anesthesia complications.   Personal Hx of Anesthesia complications (WHEN YOUNGER LASHED OUT, WAS COMBATIVE PER PATIENTP)                    Social:  Non-Smoker, Social Alcohol Use       Hematology/Oncology:  Hematology Normal   Oncology Normal                                   EENT/Dental:           Jaw Problems:  Clicking (TMJ)   Cardiovascular:            Denies Angina.          Functional Capacity 4 METS, ABLE TO CLIMB 2 FLIGHTS OF STAIRS                         Pulmonary:       Denies Shortness of breath.  Denies Recent URI.    Asthma:  last episode was Hx of Childhood Asthma.              Renal/:  Renal/ Normal                 Hepatic/GI:  Hepatic/GI Normal                 Musculoskeletal:   Musculoskeletal General/Symptoms:  Functional capacity is ambulatory without assistance.          Lumbar Spine Disorders, Lumbar Disc Disease, Radiculopathy HNP- LUMBAR, LUMBOSACRAL SPONDYLOSIS  Neurological:   Neuro Symptoms of pain OF LOWER BACK AND LLE AND RLEDx of Headaches, Migraine Headache                           Endocrine:        Metabolic Disorders, Morbid Obesity / BMI > 40  Psych:   Anxiety Disorder.  Attention Deficit Disorder (ADHD).                  Anesthesia Assessment: Preoperative EQUATION    Planned Procedure: Procedure(s) (LRB):  MIS DISCECTOMY, R L4-5 (N/A)  Requested Anesthesia Type:General  Surgeon: Lorie Juarez MD  Service: Neurosurgery  Known or anticipated Date of Surgery:1/17/2024    Surgeon notes: reviewed    Electronic QUestionnaire Assessment completed via nurse interview with patient.        Triage considerations:     The patient has no apparent active cardiac condition (No unstable coronary Syndrome such as severe unstable angina  or recent [<1 month] myocardial infarction, decompensated CHF, severe valvular   disease or significant arrhythmia)    Previous anesthesia records:GETA and No problems  2/10/2023   LEFT L4-L5 MICRODISCECTOMY, SPINE   Airway:  Mallampati: II   Mouth Opening: Normal  TM Distance: > 6 cm  Tongue: Normal  Neck ROM: Normal ROM  Airway Placement Date: 02/10/23 Placement Time: 0844 , created via procedure documentation Method of Intubation: Video Laryngoscopy Mask Ventilation: Easy Intubated: Postinduction Blade: Fierro #3 Airway Device Size: 7.0 Cuff Inflation: Minimal occlusive pressure Placement Verified By: Capnometry Complicating Factors: None Findings Post-Intubation: Bilateral breath sounds;Atraumatic/Condition of teeth unchanged Secured at: Teeth Complications: None Removal Date: 02/10/23 Removal Time: 1158     ** H/O ANESTHESIA COMPLICATION**  ** NOT WITH LAST SURGERY, WHEN YOUNGER LASHED OUT, COMBATIVE**    In note by Marcus Mtoley NP on 2/2/2023: Anesthesia issues: TMJ ; Has had combative and disoriented behavior when emerging from anesthesia- she says she needs a quiet approach and may need mother    Difficulty mouth opening: none   Steroid use in the last 12 months: 2 epidural injections and steroid dose pack for pain    Dental Issues: TMJ   Family anesthesia difficulty: Mother had cardiac arrest during hysterectomy      Last PCP note: within 3 months , outside [facility]   Subspecialty notes: Neurosurgery, OB/GYN    Other important co-morbidities: Morbid Obesity and HNP- LUMBAR WITH RADICULOPATHY       Tests already available:  Available tests,  within 3 months , 6-12 months ago , within [facility] .   12/1/2023 CT LUMBAR SPINE W/O CONTRAST, XRAY LUMBAR COMPLETE INCL F&E, 6/26/2023 MRI LUMBAR SPINE W/O CONTRAST    Outside [facility] in care everywhere: 12/27/2023 CMP, CBC            Instructions given. (See in Nurse's note)    Optimization:  Anesthesia Preop Clinic Assessment  - not indicated for this surgery     Medical Opinion Indicated          Plan:    Testing:  PT/INR, PTT, T&S, and UA   Pre-anesthesia  visit          Consultation:Patient's PCP for re-evaluation     Patient  has previously scheduled Medical Appointment:NONE    Navigation: Tests Scheduled. TBD             Consults scheduled.TBD             Results will be tracked by Preop Clinic.  1/9 Discussed case with Dr. Badillo. No poc appt needed, just document anes complication.  1/16 Labs resulted and noted by Dr. Milady Badillo. UA resulted and noted by Dr.Stuart Galeano. Medical clearance given by Dr. Rodrick Govea on 1/12( in care everywhere)    Rere Diaz RN BSN

## 2024-01-10 NOTE — PRE-PROCEDURE INSTRUCTIONS
Patient stated lashed out and was combative when was younger with anesthesia. With last surgery I 2/2023, no problems. Will need medical clearance from your PCP, Dr. James Jaffe. She will make an appt. Will need labs and ua. Our  will call to set up these appts.    Preop instructions given. Hold aspirin, aspirin containing products, nsaids(aleve, advil, motrin, ibuprofen, naprosyn, naproxen, voltaren, diclofenac), vitamins and supplements one week prior to surgery.     May take Tylenol.  Medications instructions given:    Disp Refills Start End   EScitalopram oxalate (LEXAPRO) 20 MG tablet -- -- 5/18/2023 --   Sig: Take 20 mg by mouth.   Class: Historical Med   Route: Rere Sutherland RN 1/10/2024  9:22 AM  TAKE IN AM OF SURGERY.              gabapentin (NEURONTIN) 300 MG capsule 180 capsule 11 2/10/2023 2/10/2024   Sig: Take 2 capsules (600 mg total) by mouth 3 (three) times daily.   Route: Rere Sutherland RN 1/10/2024  9:22 AM  TAKE IN AM OF SURGERY.              INNOPRAN XL 80 mg 24 hr capsule -- -- 5/16/2023 --   Sig: Take 80 mg by mouth every evening.   Class: Historical Med   Route: Rere Sutherland RN 1/10/2024  9:23 AM  TAKE THE NIGHT BEFORE SURGERY.              medroxyPROGESTERone (PROVERA) 10 MG tablet 30 tablet 12 5/29/2023 5/28/2024   Sig: Take 1 tablet (10 mg total) by mouth once daily.   Route: Oral   tiZANidine (ZANAFLEX) 4 MG tablet -- -- 2/3/2023 --   Sig: Take 4 mg by mouth 2 (two) times daily as needed.   Class: Historical Med   Route: Rere Sutherland RN 1/10/2024  9:24 AM  TAKE IF NEEDED            Your surgery has been scheduled for:_Wednesday 1/17/2024_________________________________________  [facility] ( Rere) [phone]  You should report to:  ____[facility], located on the [place] side of the first floor of the           [facility] ([phone])  __x__The Second Floor Surgery Center, located on the  [facility] side of the            Second floor of the [facility] ([phone])  ____3rd Floor SSCU located on the [facility] [address] side of the [facility] [phone]  Please Note   Tell your doctor if you take Aspirin, products containing Aspirin, herbal medications  or blood thinners, such as Coumadin, Ticlid, or Plavix.  (Consult your provider regarding holding or stopping before surgery).  Arrange for someone to drive you home following surgery.  You will not be allowed to leave the surgical facility alone or drive yourself home following sedation and anesthesia.  Before Surgery  Stop taking all vitamins/ herbal medications 14days prior to surgery  No Motrin/Advil (Ibuprofen) 7 days before surgery  No Aleve (Naproxen) 7 days before surgery  Stop Taking Asprin, products containing Asprin __7___days before surgery  Stop taking blood thinners_______days before surgery  No Goody's/BC  Powder 7 days before surgery  Refrain from drinking alcoholic beverages for 24hours before and after surgery  Stop or limit smoking _________days before surgery( nonsmoker)  You may take Tylenol for pain  Night before Surgery  Nothing to eat or drink after midnight.   Take a shower or bath (shower is recommended).  Bathe with Hibiclens soap or an antibacterial soap from the neck down.  If not supplied by your surgeon, hibiclens soap will need to be purchased over the counter in pharmacy.  Rinse soap off thoroughly.  Shampoo your hair with your regular shampoo  The Day of Surgery  NOTHING TO  DRINK 2 hours before arrival time. If you are told to take medication on the morning of surgery, it may be taken with a sip of water.   Take another bath or shower with hibiclens or any antibacterial soap, to reduce the chance of infection.  Take heart and blood pressure medications with a small sip of water, as advised by the perioperative team.  Do not take fluid pills  You may brush your teeth and  rinse your mouth, but do not swall any additional water.   Do not apply perfumes, powder, body lotions or deodorant on the day of surgery.  Nail polish should be removed.  Do not wear makeup or moisturizer  Wear comfortable clothes, such as a button front shirt and loose fitting pants.  Leave all jewelry, including body piercings, and valuables at home.    Bring any devices you will neeed after surgery such as crutches or canes.  If you have sleep apnea, please bring your CPAP machine  In the event that your physical condition changes including the onset of a cold or respiratory illness, or if you have to delay or cancel your surgery, please notify your surgeon  Stated knows where the surgery center is located. Verbalizes understanding. Also sent preop and med instructions to [facility] portal.

## 2024-01-12 ENCOUNTER — LAB VISIT (OUTPATIENT)
Dept: LAB | Facility: HOSPITAL | Age: 24
End: 2024-01-12
Attending: ANESTHESIOLOGY
Payer: MEDICAID

## 2024-01-12 DIAGNOSIS — Z01.818 PREOPERATIVE TESTING: ICD-10-CM

## 2024-01-12 LAB
ABO + RH BLD: NORMAL
APTT PPP: 28.5 SEC (ref 21–32)
BLD GP AB SCN CELLS X3 SERPL QL: NORMAL
INR PPP: 1 (ref 0.8–1.2)
PROTHROMBIN TIME: 10.3 SEC (ref 9–12.5)
SPECIMEN OUTDATE: NORMAL

## 2024-01-12 PROCEDURE — 86901 BLOOD TYPING SEROLOGIC RH(D): CPT | Performed by: ANESTHESIOLOGY

## 2024-01-12 PROCEDURE — 85610 PROTHROMBIN TIME: CPT | Performed by: ANESTHESIOLOGY

## 2024-01-12 PROCEDURE — 85730 THROMBOPLASTIN TIME PARTIAL: CPT | Performed by: ANESTHESIOLOGY

## 2024-01-12 PROCEDURE — 36415 COLL VENOUS BLD VENIPUNCTURE: CPT | Performed by: ANESTHESIOLOGY

## 2024-01-16 ENCOUNTER — ANESTHESIA EVENT (OUTPATIENT)
Dept: SURGERY | Facility: HOSPITAL | Age: 24
DRG: 519 | End: 2024-01-16
Payer: MEDICAID

## 2024-01-16 ENCOUNTER — TELEPHONE (OUTPATIENT)
Dept: NEUROSURGERY | Facility: CLINIC | Age: 24
End: 2024-01-16
Payer: MEDICAID

## 2024-01-16 NOTE — TELEPHONE ENCOUNTER
Spoke to pt    Confirmed to arrive tomorrow at 6am to 2nd floor Day of surgery center    Confirmed nothing to eat or drink after midnight tonight surgery    Confirmed bathe in antibacterial soap such as hibiclens or orange dial from the neck down  tonight and tomorrow morning    She v/u and did not have any questions regarding surgery tomorrow

## 2024-01-16 NOTE — ANESTHESIA PREPROCEDURE EVALUATION
[facility]  Anesthesia Pre-Operative Evaluation         Patient Name: Dexter Gupta  YOB: 2000  MRN: [id number]    SUBJECTIVE:     Pre-operative evaluation for Procedure(s) (LRB):  MIS DISCECTOMY, R L4-5 (N/A)     01/16/2024    Dexter Gupta is a 23 y.o. female w/ a significant PMHx of anxiety, asthma, lumbrosacral spondylosis with radiculopathy s/p microdiscectomy 2/20/23 now having recurrent disc herniation    Patient now presents for the above procedure(s).        LDA: None documented.    Prev airway:     Intubation     Date/Time: 2/10/2023 8:44 AM  Performed by: Vince Munson CRNA  Authorized by: Madeline Barragan MD      Intubation:     Induction:  Intravenous    Intubated:  Postinduction    Mask Ventilation:  Easy mask    Attempts:  1    Attempted By:  Student    Method of Intubation:  Video laryngoscopy    Blade:  Fierro 3    Laryngeal View Grade: Grade I - full view of cords      Difficult Airway Encountered?: No      Complications:  None    Airway Device:  Oral endotracheal tube    Airway Device Size:  7.0    Style/Cuff Inflation:  Cuffed (inflated to minimal occlusive pressure)    Inflation Amount (mL):  8    Tube secured:  23    Secured at:  The teeth    Placement Verified By:  Capnometry    Complicating Factors:  None    Findings Post-Intubation:  BS equal bilateral and atraumatic/condition of teeth unchanged          Drips: None documented.    Patient Active Problem List   Diagnosis    Attention deficit hyperactivity disorder (ADHD)    Generalized anxiety disorder    Migraine headache    Tachycardia    Body mass index 40.0-44.9, adult    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-sided sciatica    Other spondylosis with radiculopathy, lumbosacral region    History of general anesthesia complication    Pre-op evaluation    Lumbar disc herniation    Decreased strength    Impaired mobility    Migraine without aura and without status migrainosus, not intractable        Review of patient's allergies indicates:  No Known Allergies    Current Outpatient Medications:  No current facility-administered medications for this encounter.    Current Outpatient Medications:     EScitalopram oxalate (LEXAPRO) 20 MG tablet, Take 20 mg by mouth., Disp: , Rfl:     gabapentin (NEURONTIN) 300 MG capsule, Take 2 capsules (600 mg total) by mouth 3 (three) times daily., Disp: 180 capsule, Rfl: 11    INNOPRAN XL 80 mg 24 hr capsule, Take 80 mg by mouth every evening., Disp: , Rfl:     medroxyPROGESTERone (PROVERA) 10 MG tablet, Take 1 tablet (10 mg total) by mouth once daily. (Patient taking differently: Take 10 mg by mouth once daily. Takes 1st 1-0 days of the month), Disp: 30 tablet, Rfl: 12    tiZANidine (ZANAFLEX) 4 MG tablet, Take 4 mg by mouth 2 (two) times daily as needed., Disp: , Rfl:     Past Surgical History:   Procedure Laterality Date    MICRODISCECTOMY OF SPINE N/A 2/10/2023    Procedure: LEFT L4-L5 MICRODISCECTOMY, SPINE;  Surgeon: Lorie Juarez MD;  Location: [facility] OR [address] [place] [address];  Service: Neurosurgery;  Laterality: N/A;    TONSILLECTOMY      TYMPANOSTOMY TUBE PLACEMENT Bilateral     3 separates operations; 1998, 1999, 2001       Social History     Socioeconomic History    Marital status: Significant Other     Spouse name: Boaz    Number of children: 0   Tobacco Use    Smoking status: Never     Passive exposure: Never    Smokeless tobacco: Never   Substance and Sexual Activity    Alcohol use: Not Currently     Comment: rare    Drug use: Never    Sexual activity: Yes     Partners: Male     Birth control/protection: Patch, Condom   Social History Narrative    Stairs- 13       OBJECTIVE:     Vital Signs Range (Last 24H):         Significant Labs:  Lab Results   Component Value Date    WBC 6.40 08/28/2023    HGB 12.2 08/28/2023    HCT 36.8 (L) 08/28/2023     08/28/2023    ALT 24 08/28/2023    AST 21 08/28/2023     08/28/2023    K 3.6 08/28/2023     08/28/2023     CREATININE 0.9 08/28/2023    BUN 7 08/28/2023    CO2 21 (L) 08/28/2023    TSH 1.012 04/29/2022    INR 1.0 01/12/2024       Diagnostic Studies: No relevant studies.    EKG:   Results for orders placed or performed during the hospital encounter of 02/02/23   EKG 12-lead    Collection Time: 02/02/23  9:51 AM    Narrative    Test Reason : R00.0,F41.1,M47.27,    Vent. Rate : 076 BPM     Atrial Rate : 076 BPM     P-R Int : 132 ms          QRS Dur : 088 ms      QT Int : 384 ms       P-R-T Axes : 065 084 059 degrees     QTc Int : 432 ms    Normal sinus rhythm  Normal ECG  No previous ECGs available  Confirmed by Nikunj Lane MD ([id number]) on 2/2/2023 12:20:10 PM    Referred By: SALOMON VOGEL           Confirmed By:Nikunj Lane MD       2D ECHO:  TTE:  No results found for this or any previous visit.    MYA:  No results found for this or any previous visit.    ASSESSMENT/PLAN:           Pre-op Assessment    I have reviewed the Patient Summary Reports.     I have reviewed the Nursing Notes. I have reviewed the NPO Status.   I have reviewed the Medications.     Review of Systems  Anesthesia Hx:   History of prior surgery of interest to airway management or planning:          Denies Family Hx of Anesthesia complications.   Personal Hx of Anesthesia complications (WHEN YOUNGER LASHED OUT, WAS COMBATIVE PER PATIENTP)                    Social:  Non-Smoker, Social Alcohol Use       Hematology/Oncology:  Hematology Normal   Oncology Normal                                   EENT/Dental:           Jaw Problems:  Clicking (TMJ)   Cardiovascular:            Denies Angina.          Functional Capacity 4 METS, ABLE TO CLIMB 2 FLIGHTS OF STAIRS                         Pulmonary:       Denies Shortness of breath.  Denies Recent URI.    Asthma:  last episode was Hx of Childhood Asthma.              Renal/:  Renal/ Normal                 Hepatic/GI:  Hepatic/GI Normal                 Musculoskeletal:     Recurrent disc herniation  Musculoskeletal General/Symptoms:  Functional capacity is ambulatory without assistance.       Spine Disorders: lumbar          Lumbar Spine Disorders, Lumbar Disc Disease, Radiculopathy HNP- LUMBAR, LUMBOSACRAL SPONDYLOSIS  Neurological:   Neuro Symptoms of pain OF LOWER BACK AND LLE AND RLEDx of Headaches, Migraine Headache                           Endocrine:        Metabolic Disorders, Morbid Obesity / BMI > 40  Psych:   Anxiety Disorder.  Attention Deficit Disorder (ADHD).             Physical Exam  General: Well nourished    Airway:  Mallampati: II   Mouth Opening: Normal  TM Distance: > 6 cm  Tongue: Normal  Neck ROM: Normal ROM    Dental:  Intact        Anesthesia Plan  Type of Anesthesia, risks & benefits discussed:    Anesthesia Type: Gen ETT  Intra-op Monitoring Plan: Standard ASA Monitors  Post Op Pain Control Plan: multimodal analgesia and IV/PO Opioids PRN  Induction:  IV  Airway Plan: Direct, Post-Induction  Informed Consent: Informed consent signed with the Patient and all parties understand the risks and agree with anesthesia plan.  All questions answered.   ASA Score: 3  Day of Surgery Review of History & Physical: H&P Update referred to the surgeon/provider.    Ready For Surgery From Anesthesia Perspective.     .

## 2024-01-17 ENCOUNTER — HOSPITAL ENCOUNTER (INPATIENT)
Facility: HOSPITAL | Age: 24
LOS: 1 days | Discharge: HOME | DRG: 519 | End: 2024-01-17
Attending: STUDENT IN AN ORGANIZED HEALTH CARE EDUCATION/TRAINING PROGRAM | Admitting: STUDENT IN AN ORGANIZED HEALTH CARE EDUCATION/TRAINING PROGRAM
Payer: MEDICAID

## 2024-01-17 ENCOUNTER — ANESTHESIA (OUTPATIENT)
Dept: SURGERY | Facility: HOSPITAL | Age: 24
DRG: 519 | End: 2024-01-17
Payer: MEDICAID

## 2024-01-17 VITALS
BODY MASS INDEX: 42.82 KG/M2 | RESPIRATION RATE: 20 BRPM | HEIGHT: 65 IN | OXYGEN SATURATION: 96 % | WEIGHT: 257 LBS | DIASTOLIC BLOOD PRESSURE: 76 MMHG | HEART RATE: 97 BPM | TEMPERATURE: 98 F | SYSTOLIC BLOOD PRESSURE: 121 MMHG

## 2024-01-17 DIAGNOSIS — M51.26 LUMBAR DISC HERNIATION: ICD-10-CM

## 2024-01-17 LAB
B-HCG UR QL: NEGATIVE
CTP QC/QA: YES

## 2024-01-17 PROCEDURE — 63600175 PHARM REV CODE 636 W HCPCS: Performed by: STUDENT IN AN ORGANIZED HEALTH CARE EDUCATION/TRAINING PROGRAM

## 2024-01-17 PROCEDURE — 0SB23ZZ EXCISION OF LUMBAR VERTEBRAL DISC, PERCUTANEOUS APPROACH: ICD-10-PCS | Performed by: STUDENT IN AN ORGANIZED HEALTH CARE EDUCATION/TRAINING PROGRAM

## 2024-01-17 PROCEDURE — 63600175 PHARM REV CODE 636 W HCPCS

## 2024-01-17 PROCEDURE — 00NY3ZZ RELEASE LUMBAR SPINAL CORD, PERCUTANEOUS APPROACH: ICD-10-PCS | Performed by: NEUROLOGICAL SURGERY

## 2024-01-17 PROCEDURE — D9220A PRA ANESTHESIA: Mod: ,,, | Performed by: ANESTHESIOLOGY

## 2024-01-17 PROCEDURE — 01NB3ZZ RELEASE LUMBAR NERVE, PERCUTANEOUS APPROACH: ICD-10-PCS | Performed by: NEUROLOGICAL SURGERY

## 2024-01-17 PROCEDURE — 37000008 HC ANESTHESIA 1ST 15 MINUTES: Performed by: STUDENT IN AN ORGANIZED HEALTH CARE EDUCATION/TRAINING PROGRAM

## 2024-01-17 PROCEDURE — 37000009 HC ANESTHESIA EA ADD 15 MINS: Performed by: STUDENT IN AN ORGANIZED HEALTH CARE EDUCATION/TRAINING PROGRAM

## 2024-01-17 PROCEDURE — 36000711: Performed by: STUDENT IN AN ORGANIZED HEALTH CARE EDUCATION/TRAINING PROGRAM

## 2024-01-17 PROCEDURE — 71000016 HC POSTOP RECOV ADDL HR: Performed by: STUDENT IN AN ORGANIZED HEALTH CARE EDUCATION/TRAINING PROGRAM

## 2024-01-17 PROCEDURE — 25000003 PHARM REV CODE 250: Performed by: PHYSICIAN ASSISTANT

## 2024-01-17 PROCEDURE — 11000001 HC ACUTE MED/SURG PRIVATE ROOM

## 2024-01-17 PROCEDURE — 71000044 HC DOSC ROUTINE RECOVERY FIRST HOUR: Performed by: STUDENT IN AN ORGANIZED HEALTH CARE EDUCATION/TRAINING PROGRAM

## 2024-01-17 PROCEDURE — 25000003 PHARM REV CODE 250: Performed by: STUDENT IN AN ORGANIZED HEALTH CARE EDUCATION/TRAINING PROGRAM

## 2024-01-17 PROCEDURE — 25000003 PHARM REV CODE 250

## 2024-01-17 PROCEDURE — 71000015 HC POSTOP RECOV 1ST HR: Performed by: STUDENT IN AN ORGANIZED HEALTH CARE EDUCATION/TRAINING PROGRAM

## 2024-01-17 PROCEDURE — 36000710: Performed by: STUDENT IN AN ORGANIZED HEALTH CARE EDUCATION/TRAINING PROGRAM

## 2024-01-17 PROCEDURE — 63030 LAMOT DCMPRN NRV RT 1 LMBR: CPT | Mod: 62,RT,, | Performed by: NEUROLOGICAL SURGERY

## 2024-01-17 PROCEDURE — 63030 LAMOT DCMPRN NRV RT 1 LMBR: CPT | Mod: 62,RT,, | Performed by: STUDENT IN AN ORGANIZED HEALTH CARE EDUCATION/TRAINING PROGRAM

## 2024-01-17 PROCEDURE — 27201423 OPTIME MED/SURG SUP & DEVICES STERILE SUPPLY: Performed by: STUDENT IN AN ORGANIZED HEALTH CARE EDUCATION/TRAINING PROGRAM

## 2024-01-17 RX ORDER — CEFAZOLIN SODIUM 1 G/3ML
INJECTION, POWDER, FOR SOLUTION INTRAMUSCULAR; INTRAVENOUS
Status: DISCONTINUED | OUTPATIENT
Start: 2024-01-17 | End: 2024-01-17

## 2024-01-17 RX ORDER — LIDOCAINE HYDROCHLORIDE 20 MG/ML
INJECTION, SOLUTION EPIDURAL; INFILTRATION; INTRACAUDAL; PERINEURAL
Status: DISCONTINUED | OUTPATIENT
Start: 2024-01-17 | End: 2024-01-17

## 2024-01-17 RX ORDER — MUPIROCIN 20 MG/G
OINTMENT TOPICAL
Status: DISCONTINUED | OUTPATIENT
Start: 2024-01-17 | End: 2024-01-17 | Stop reason: HOSPADM

## 2024-01-17 RX ORDER — HYDROCODONE BITARTRATE AND ACETAMINOPHEN 5; 325 MG/1; MG/1
1 TABLET ORAL EVERY 6 HOURS PRN
Status: DISCONTINUED | OUTPATIENT
Start: 2024-01-17 | End: 2024-01-17 | Stop reason: HOSPADM

## 2024-01-17 RX ORDER — ACETAMINOPHEN 325 MG/1
650 TABLET ORAL EVERY 6 HOURS PRN
Status: DISCONTINUED | OUTPATIENT
Start: 2024-01-17 | End: 2024-01-17 | Stop reason: HOSPADM

## 2024-01-17 RX ORDER — SUCCINYLCHOLINE CHLORIDE 20 MG/ML
INJECTION INTRAMUSCULAR; INTRAVENOUS
Status: DISCONTINUED | OUTPATIENT
Start: 2024-01-17 | End: 2024-01-17

## 2024-01-17 RX ORDER — METHOCARBAMOL 500 MG/1
500 TABLET, FILM COATED ORAL 4 TIMES DAILY
Qty: 40 TABLET | Refills: 0 | Status: SHIPPED | OUTPATIENT
Start: 2024-01-17 | End: 2024-01-27

## 2024-01-17 RX ORDER — HYDROCODONE BITARTRATE AND ACETAMINOPHEN 10; 325 MG/1; MG/1
1 TABLET ORAL EVERY 6 HOURS PRN
Status: DISCONTINUED | OUTPATIENT
Start: 2024-01-17 | End: 2024-01-17 | Stop reason: HOSPADM

## 2024-01-17 RX ORDER — SODIUM CHLORIDE 0.9 % (FLUSH) 0.9 %
10 SYRINGE (ML) INJECTION
Status: DISCONTINUED | OUTPATIENT
Start: 2024-01-17 | End: 2024-01-17 | Stop reason: HOSPADM

## 2024-01-17 RX ORDER — BISACODYL 10 MG/1
10 SUPPOSITORY RECTAL DAILY
Status: DISCONTINUED | OUTPATIENT
Start: 2024-01-17 | End: 2024-01-17

## 2024-01-17 RX ORDER — MUPIROCIN 20 MG/G
1 OINTMENT TOPICAL 2 TIMES DAILY
Status: DISCONTINUED | OUTPATIENT
Start: 2024-01-17 | End: 2024-01-17 | Stop reason: HOSPADM

## 2024-01-17 RX ORDER — ONDANSETRON 8 MG/1
8 TABLET, ORALLY DISINTEGRATING ORAL EVERY 6 HOURS PRN
Status: DISCONTINUED | OUTPATIENT
Start: 2024-01-17 | End: 2024-01-17 | Stop reason: HOSPADM

## 2024-01-17 RX ORDER — AMOXICILLIN 250 MG
2 CAPSULE ORAL NIGHTLY PRN
Status: DISCONTINUED | OUTPATIENT
Start: 2024-01-17 | End: 2024-01-17

## 2024-01-17 RX ORDER — HYDROMORPHONE HYDROCHLORIDE 1 MG/ML
0.2 INJECTION, SOLUTION INTRAMUSCULAR; INTRAVENOUS; SUBCUTANEOUS EVERY 5 MIN PRN
Status: DISCONTINUED | OUTPATIENT
Start: 2024-01-17 | End: 2024-01-17 | Stop reason: HOSPADM

## 2024-01-17 RX ORDER — OXYCODONE HYDROCHLORIDE 5 MG/1
5 TABLET ORAL EVERY 4 HOURS PRN
Status: DISCONTINUED | OUTPATIENT
Start: 2024-01-17 | End: 2024-01-17

## 2024-01-17 RX ORDER — TALC
6 POWDER (GRAM) TOPICAL NIGHTLY PRN
Status: DISCONTINUED | OUTPATIENT
Start: 2024-01-17 | End: 2024-01-17 | Stop reason: HOSPADM

## 2024-01-17 RX ORDER — PROCHLORPERAZINE EDISYLATE 5 MG/ML
5 INJECTION INTRAMUSCULAR; INTRAVENOUS EVERY 6 HOURS PRN
Status: DISCONTINUED | OUTPATIENT
Start: 2024-01-17 | End: 2024-01-17 | Stop reason: HOSPADM

## 2024-01-17 RX ORDER — ALUMINUM HYDROXIDE, MAGNESIUM HYDROXIDE, AND SIMETHICONE 1200; 120; 1200 MG/30ML; MG/30ML; MG/30ML
30 SUSPENSION ORAL EVERY 4 HOURS PRN
Status: DISCONTINUED | OUTPATIENT
Start: 2024-01-17 | End: 2024-01-17 | Stop reason: HOSPADM

## 2024-01-17 RX ORDER — ACETAMINOPHEN 500 MG
500 TABLET ORAL EVERY 6 HOURS PRN
COMMUNITY
End: 2024-06-05

## 2024-01-17 RX ORDER — PROPOFOL 10 MG/ML
VIAL (ML) INTRAVENOUS
Status: DISCONTINUED | OUTPATIENT
Start: 2024-01-17 | End: 2024-01-17

## 2024-01-17 RX ORDER — POLYETHYLENE GLYCOL 3350 17 G/17G
17 POWDER, FOR SOLUTION ORAL DAILY
Status: DISCONTINUED | OUTPATIENT
Start: 2024-01-17 | End: 2024-01-17

## 2024-01-17 RX ORDER — DIAZEPAM 5 MG/1
5 TABLET ORAL EVERY 6 HOURS PRN
Status: DISCONTINUED | OUTPATIENT
Start: 2024-01-17 | End: 2024-01-17

## 2024-01-17 RX ORDER — KETAMINE HCL IN 0.9 % NACL 50 MG/5 ML
SYRINGE (ML) INTRAVENOUS
Status: DISCONTINUED | OUTPATIENT
Start: 2024-01-17 | End: 2024-01-17

## 2024-01-17 RX ORDER — EPHEDRINE SULFATE 50 MG/ML
INJECTION, SOLUTION INTRAVENOUS
Status: DISCONTINUED | OUTPATIENT
Start: 2024-01-17 | End: 2024-01-17

## 2024-01-17 RX ORDER — DEXAMETHASONE SODIUM PHOSPHATE 4 MG/ML
INJECTION, SOLUTION INTRA-ARTICULAR; INTRALESIONAL; INTRAMUSCULAR; INTRAVENOUS; SOFT TISSUE
Status: DISCONTINUED | OUTPATIENT
Start: 2024-01-17 | End: 2024-01-17

## 2024-01-17 RX ORDER — GABAPENTIN 300 MG/1
600 CAPSULE ORAL 3 TIMES DAILY
Status: CANCELLED | OUTPATIENT
Start: 2024-01-17

## 2024-01-17 RX ORDER — MUPIROCIN 20 MG/G
OINTMENT TOPICAL 2 TIMES DAILY
Status: DISCONTINUED | OUTPATIENT
Start: 2024-01-17 | End: 2024-01-17

## 2024-01-17 RX ORDER — HYDROCODONE BITARTRATE AND ACETAMINOPHEN 5; 325 MG/1; MG/1
1 TABLET ORAL EVERY 6 HOURS PRN
Qty: 40 TABLET | Refills: 0 | Status: SHIPPED | OUTPATIENT
Start: 2024-01-17 | End: 2024-01-18

## 2024-01-17 RX ORDER — ACETAMINOPHEN 325 MG/1
650 TABLET ORAL EVERY 6 HOURS
Status: DISCONTINUED | OUTPATIENT
Start: 2024-01-17 | End: 2024-01-17

## 2024-01-17 RX ORDER — ESCITALOPRAM OXALATE 20 MG/1
20 TABLET ORAL DAILY
Status: CANCELLED | OUTPATIENT
Start: 2024-01-17

## 2024-01-17 RX ORDER — CEFTRIAXONE 1 G/1
INJECTION, POWDER, FOR SOLUTION INTRAMUSCULAR; INTRAVENOUS
Status: DISCONTINUED | OUTPATIENT
Start: 2024-01-17 | End: 2024-01-17 | Stop reason: HOSPADM

## 2024-01-17 RX ORDER — FENTANYL CITRATE 50 UG/ML
INJECTION, SOLUTION INTRAMUSCULAR; INTRAVENOUS
Status: DISCONTINUED | OUTPATIENT
Start: 2024-01-17 | End: 2024-01-17

## 2024-01-17 RX ORDER — PHENYLEPHRINE HCL IN 0.9% NACL 1 MG/10 ML
SYRINGE (ML) INTRAVENOUS
Status: DISCONTINUED | OUTPATIENT
Start: 2024-01-17 | End: 2024-01-17

## 2024-01-17 RX ORDER — OXYCODONE HYDROCHLORIDE 10 MG/1
10 TABLET ORAL EVERY 4 HOURS PRN
Status: DISCONTINUED | OUTPATIENT
Start: 2024-01-17 | End: 2024-01-17

## 2024-01-17 RX ORDER — HALOPERIDOL 5 MG/ML
0.5 INJECTION INTRAMUSCULAR EVERY 10 MIN PRN
Status: DISCONTINUED | OUTPATIENT
Start: 2024-01-17 | End: 2024-01-17 | Stop reason: HOSPADM

## 2024-01-17 RX ORDER — AMOXICILLIN 250 MG
1 CAPSULE ORAL DAILY PRN
Status: DISCONTINUED | OUTPATIENT
Start: 2024-01-17 | End: 2024-01-17 | Stop reason: HOSPADM

## 2024-01-17 RX ORDER — METHYLPREDNISOLONE 4 MG/1
TABLET ORAL
Qty: 21 EACH | Refills: 0 | Status: SHIPPED | OUTPATIENT
Start: 2024-01-17 | End: 2024-02-07

## 2024-01-17 RX ORDER — LIDOCAINE HYDROCHLORIDE AND EPINEPHRINE 10; 10 MG/ML; UG/ML
INJECTION, SOLUTION INFILTRATION; PERINEURAL
Status: DISCONTINUED | OUTPATIENT
Start: 2024-01-17 | End: 2024-01-17 | Stop reason: HOSPADM

## 2024-01-17 RX ORDER — METHYLPREDNISOLONE ACETATE 40 MG/ML
INJECTION, SUSPENSION INTRA-ARTICULAR; INTRALESIONAL; INTRAMUSCULAR; SOFT TISSUE
Status: DISCONTINUED | OUTPATIENT
Start: 2024-01-17 | End: 2024-01-17 | Stop reason: HOSPADM

## 2024-01-17 RX ORDER — SODIUM CHLORIDE, SODIUM LACTATE, POTASSIUM CHLORIDE, CALCIUM CHLORIDE 600; 310; 30; 20 MG/100ML; MG/100ML; MG/100ML; MG/100ML
INJECTION, SOLUTION INTRAVENOUS CONTINUOUS
Status: DISCONTINUED | OUTPATIENT
Start: 2024-01-17 | End: 2024-01-17 | Stop reason: HOSPADM

## 2024-01-17 RX ADMIN — HYDROMORPHONE HYDROCHLORIDE 0.2 MG: 1 INJECTION, SOLUTION INTRAMUSCULAR; INTRAVENOUS; SUBCUTANEOUS at 12:01

## 2024-01-17 RX ADMIN — Medication 25 MG: at 09:01

## 2024-01-17 RX ADMIN — SUCCINYLCHOLINE CHLORIDE 120 MG: 20 INJECTION, SOLUTION INTRAMUSCULAR; INTRAVENOUS at 08:01

## 2024-01-17 RX ADMIN — SODIUM CHLORIDE 0.2 MCG/KG/MIN: 9 INJECTION, SOLUTION INTRAVENOUS at 08:01

## 2024-01-17 RX ADMIN — MIDAZOLAM HYDROCHLORIDE 2 MG: 1 INJECTION, SOLUTION INTRAMUSCULAR; INTRAVENOUS at 08:01

## 2024-01-17 RX ADMIN — CEFAZOLIN 2 G: 330 INJECTION, POWDER, FOR SOLUTION INTRAMUSCULAR; INTRAVENOUS at 09:01

## 2024-01-17 RX ADMIN — EPHEDRINE SULFATE 5 MG: 50 INJECTION INTRAVENOUS at 09:01

## 2024-01-17 RX ADMIN — LIDOCAINE HYDROCHLORIDE 80 MG: 20 INJECTION, SOLUTION EPIDURAL; INFILTRATION; INTRACAUDAL; PERINEURAL at 08:01

## 2024-01-17 RX ADMIN — HYDROCODONE BITARTRATE AND ACETAMINOPHEN 1 TABLET: 10; 325 TABLET ORAL at 01:01

## 2024-01-17 RX ADMIN — Medication 100 MCG: at 09:01

## 2024-01-17 RX ADMIN — EPHEDRINE SULFATE 10 MG: 50 INJECTION INTRAVENOUS at 10:01

## 2024-01-17 RX ADMIN — FENTANYL CITRATE 100 MCG: 50 INJECTION, SOLUTION INTRAMUSCULAR; INTRAVENOUS at 08:01

## 2024-01-17 RX ADMIN — MUPIROCIN: 20 OINTMENT TOPICAL at 06:01

## 2024-01-17 RX ADMIN — DEXAMETHASONE SODIUM PHOSPHATE 8 MG: 4 INJECTION INTRA-ARTICULAR; INTRALESIONAL; INTRAMUSCULAR; INTRAVENOUS; SOFT TISSUE at 09:01

## 2024-01-17 RX ADMIN — SODIUM CHLORIDE: 0.9 INJECTION, SOLUTION INTRAVENOUS at 08:01

## 2024-01-17 RX ADMIN — SODIUM CHLORIDE, SODIUM GLUCONATE, SODIUM ACETATE, POTASSIUM CHLORIDE, MAGNESIUM CHLORIDE, SODIUM PHOSPHATE, DIBASIC, AND POTASSIUM PHOSPHATE: .53; .5; .37; .037; .03; .012; .00082 INJECTION, SOLUTION INTRAVENOUS at 08:01

## 2024-01-17 RX ADMIN — PROPOFOL 180 MG: 10 INJECTION, EMULSION INTRAVENOUS at 08:01

## 2024-01-17 RX ADMIN — SODIUM CHLORIDE 0.25 MCG/KG/MIN: 9 INJECTION, SOLUTION INTRAVENOUS at 09:01

## 2024-01-17 RX ADMIN — Medication 200 MCG: at 08:01

## 2024-01-17 RX ADMIN — EPHEDRINE SULFATE 10 MG: 50 INJECTION INTRAVENOUS at 09:01

## 2024-01-17 NOTE — PROGRESS NOTES
ÁNGELA Vasquez at bedside preforming a neuro assessment. She states that the patient is strong and will be sending the patient home with a solumedrol dose pack to decrease nerve root irritation and help to resolve Right great toe numbness. Discharge instructions given and reviewed. Awaiting bedside delivery from pharmacy.

## 2024-01-17 NOTE — DISCHARGE INSTRUCTIONS
Neurosurgery Patient Information. Please follow ONLY the instructions that are checked below.    Activity Restrictions:  [x]  Return to work will be determined on an individual basis.  [x]  No lifting greater than 5-10 pounds.  [x]  Avoid bending and twisting the area of your surgery more than 45 degrees from neutral position in any direction.  [x]  No driving or operating machinery:  [x]  until cleared by your surgeon.  [x]  while taking narcotic pain medications or muscle relaxants.  [x]  No brace needed  [x]  Slowly increase your ambulation [walking] over the next 2 weeks as tolerated. The goal is to be walking 1-2 miles by the time of your 2 week post op appointment.   [x]  Walk on paved surfaces only. It is okay to walk up and down stairs while holding onto a side rail.  [x]  No sexual activity for 6 weeks.    Discharge Medication/Follow-up:  [x]  Please refer to discharge medication reconciliation form.  [x]  Do not take ANY Aspirin or Aspirin containing products for 2 weeks after surgery.   [x]  Do not take ANY herbal supplements for 2 weeks after surgery.    [x]  Do not take ANY non-steroidal anti-inflammatory drugs (NSAIDS), including the following: ibuprofen, naprosyn, Aleve, Advil, Indocin, Mobic, or Celebrex for 6 weeks after surgery.   [x]  Prescriptions for appropriate medication will be given upon discharge.  [x]  Take docusate (Colace 100 mg): take one capsule a day as needed for constipation. You can get this over the counter.  [x]  Follow-up appointment:  [x]  10-14 days post-op for wound check by physician assistant/nurse  [x]  4-6 weeks with MD:  [x]  with x-rays  [x]  An appointment will be mailed to you.    Wound Care:  [x]  Remove bandage tomorrow. Keep your incision open to the air.  [x]  You may shower on the 2nd day after your surgery. Keep the incision clean and dry at all times. Please cover the incision while showering and REMOVE once you have completed taking your shower. Do not allow  the force of water to hit the incision. If the incision gets damp, pat it dry. Do not rub or scrub the incision.  [x]  You cannot take a bath until 8 weeks after surgery.  [x]  The incision does not need to be cleaned with any water, soap, alcohol, peroxide, or other substance.  [x]  Apply bacitracin to incision twice a day for 2 weeks.    Call your doctor or go to the Emergency Room for any signs of infection, including: increased redness, drainage, pain, or fever (temperature ?101.5 for 24 hours). Call your doctor or go to the Emergency Room if there are any localized neurological changes; problems with speech, vision, numbness, tingling, weakness, or severe headache; or for other concerns.    Special Instructions:  [x]  No use of tobacco products.  [x]  Diet: Please eat a regular diet as tolerated.      Physicians need 3 days' notice for pain medicine to be refilled. Pain medicine will only be refilled between 8 AM and 5 PM, Monday through Friday, due to Food and Drug Administration regulation of documentation.        [facility] Neurosurgery Office: [phone]              [facility] Neurosurgery Office: [phone]   [place] Neurosurgery Office: [phone]

## 2024-01-17 NOTE — ANESTHESIA POSTPROCEDURE EVALUATION
Anesthesia Post Evaluation    Patient: Dexter Gupta    Procedure(s) Performed: Procedure(s) (LRB):  MIS DISCECTOMY, R L4-5 (N/A)    Final Anesthesia Type: general      Patient location during evaluation: PACU  Patient participation: Yes- Able to Participate  Level of consciousness: awake and alert and oriented  Post-procedure vital signs: reviewed and stable  Pain management: adequate  Airway patency: patent    PONV status at discharge: No PONV  Anesthetic complications: no      Cardiovascular status: hemodynamically stable  Respiratory status: unassisted, spontaneous ventilation and room air  Hydration status: euvolemic  Follow-up not needed.              Vitals Value Taken Time   /72 01/17/24 1432   Temp 36.7 °C (98.1 °F) 01/17/24 1130   Pulse 98 01/17/24 1450   Resp 20 01/17/24 1345   SpO2 95 % 01/17/24 1450   Vitals shown include unvalidated device data.      No case tracking events are documented in the log.      Pain/Vivi Score: Pain Rating Prior to Med Admin: 7 (1/17/2024  1:11 PM)  Vivi Score: 10 (1/17/2024 12:00 PM)

## 2024-01-17 NOTE — TRANSFER OF CARE
"Anesthesia Transfer of Care Note    Patient: Dexter Gupta    Procedure(s) Performed: Procedure(s) (LRB):  MIS DISCECTOMY, R L4-5 (N/A)    Patient location: PACU    Anesthesia Type: general    Transport from OR: Transported from OR on 6-10 L/min O2 by face mask with adequate spontaneous ventilation    Post pain: adequate analgesia    Post assessment: no apparent anesthetic complications    Post vital signs: stable    Level of consciousness: awake and alert    Nausea/Vomiting: no nausea/vomiting    Complications: none    Transfer of care protocol was followed      Last vitals: Visit Vitals  /76 (BP Location: Right arm, Patient Position: Lying)   Pulse 84   Temp 36.7 °C (98.1 °F) (Oral)   Resp 20   Ht 5' 5" (1.651 m)   Wt 116.6 kg (257 lb)   LMP 01/02/2024   SpO2 99%   Breastfeeding No   BMI 42.77 kg/m²     "
Self

## 2024-01-17 NOTE — PROGRESS NOTES
Call placed to neurosurgery team. Spoke to Christina MOTTA. Informed of patients new complaint of R great toe numbness. Parent at bedside. Parent requesting to speak to Dr Juarez prior to leaving the facility to clarify discharge disposition. Christina states that she will come to bedside to assess the patient.     Patient reports a decrease in pain down to a 6 out of 10.

## 2024-01-17 NOTE — BRIEF OP NOTE
[facility] - [facility] (2nd Fl)  Brief Operative Note    SUMMARY     Surgery Date: 1/17/2024     Surgeon(s) and Role:     * Lorie Juarez MD - Primary     * Marium Arreola MD -Co-surgeon    Pre-op Diagnosis:  Herniation of nucleus pulposus, lumbar [M51.26]  Radiculopathy, unspecified spinal region [M54.10]    Post-op Diagnosis:  Post-Op Diagnosis Codes:     * Herniation of nucleus pulposus, lumbar [M51.26]     * Radiculopathy, unspecified spinal region [M54.10]    Procedure(s) (LRB):  MIS DISCECTOMY, R L4-5 (N/A)    Anesthesia: General    Implants:  * No implants in log *    Operative Findings: as expected    Estimated Blood Loss: minimal    Estimated Blood Loss has been documented.         Specimens:   Specimen (24h ago, onward)      None            [id number]

## 2024-01-17 NOTE — ANESTHESIA PROCEDURE NOTES
Intubation    Date/Time: 1/17/2024 8:22 AM    Performed by: Rachael Akins MD  Authorized by: Sidney Lyons MD    Intubation:     Induction:  Intravenous    Intubated:  Postinduction    Mask Ventilation:  Easy mask    Attempts:  1    Attempted By:  Resident anesthesiologist    Method of Intubation:  Direct    Blade:  Gabriel 2    Laryngeal View Grade: Grade IIA - cords partially seen      Difficult Airway Encountered?: No      Complications:  None    Airway Device:  Oral endotracheal tube    Airway Device Size:  7.0    Style/Cuff Inflation:  Cuffed    Inflation Amount (mL):  7    Tube secured:  24    Secured at:  The lips    Placement Verified By:  Capnometry    Complicating Factors:  None    Findings Post-Intubation:  Atraumatic/condition of teeth unchanged and BS equal bilateral

## 2024-01-18 ENCOUNTER — TELEPHONE (OUTPATIENT)
Dept: NEUROSURGERY | Facility: CLINIC | Age: 24
End: 2024-01-18
Payer: MEDICAID

## 2024-01-18 DIAGNOSIS — M51.26 HERNIATION OF NUCLEUS PULPOSUS, LUMBAR: Primary | ICD-10-CM

## 2024-01-18 DIAGNOSIS — M51.16 LUMBAR DISC HERNIATION WITH RADICULOPATHY: ICD-10-CM

## 2024-01-18 DIAGNOSIS — M51.26 RECURRENT HERNIATION OF LUMBAR DISC: ICD-10-CM

## 2024-01-18 RX ORDER — MIDAZOLAM HYDROCHLORIDE 1 MG/ML
INJECTION, SOLUTION INTRAMUSCULAR; INTRAVENOUS
Status: DISCONTINUED | OUTPATIENT
Start: 2024-01-17 | End: 2024-01-18

## 2024-01-18 RX ORDER — OXYCODONE HYDROCHLORIDE 5 MG/1
5 TABLET ORAL EVERY 6 HOURS PRN
Qty: 12 TABLET | Refills: 0 | Status: SHIPPED | OUTPATIENT
Start: 2024-01-18 | End: 2024-06-05

## 2024-01-18 NOTE — TELEPHONE ENCOUNTER
Candace and I spoke to pt and her mom. She said she is currently experiencing severe pain around surgery site with no relief after taking steroids, muscle relaxers, and 2 tabs Norco.     Candace sent in short course oxycodone rx and instructed mom to supplement with tylenol for break through. She also instructed pt and mom to not take norco while taking oxycodone. They both v/u

## 2024-01-18 NOTE — ADDENDUM NOTE
Addendum  created 01/18/24 1118 by Rachael Akins MD    Intraprocedure Event edited, Intraprocedure Meds edited

## 2024-01-18 NOTE — TELEPHONE ENCOUNTER
----- Message from Jade Owens sent at 1/18/2024  1:29 PM CST -----  Regarding: Pt Advice  Contact: [phone]  Alexis /loretta requesting a call back to discuss pt plan of care please call Alexis [phone]

## 2024-01-19 RX ORDER — MIDAZOLAM HYDROCHLORIDE 1 MG/ML
INJECTION, SOLUTION INTRAMUSCULAR; INTRAVENOUS
Status: DISCONTINUED | OUTPATIENT
Start: 2024-01-17 | End: 2024-01-19

## 2024-01-19 NOTE — DISCHARGE SUMMARY
[facility] - Surgery ([facility])  Neurosurgery  Discharge Summary      Patient Name: Dexter Gupta  MRN: [id number]  Admission Date: 1/17/2024  Hospital Length of Stay: 1 days  Discharge Date and Time: 1/17/2024  4:20 PM  Attending Physician: Lorie Juarez MD  Discharging Provider: Sonia Enriquez PA-C  Primary Care Provider: Amelia, Primary Doctor    HPI: Dexter Gupta is a 23 y.o. female who is s/p left L4/5 MIS microdiscectomy on 2/20/23 for left leg radiculopathy that has markedly improved & nearly resolved but now having primarily right leg radicular pain and large recurrent disc herniation. I previously discussed right L4-5 MIS laminotomy and discectomy for decompression with the patient in detail and informed consent was obtained prior to surgery.  This morning, she denies any changes in her right leg radicular pain and has no additional questions.  She agrees to proceed as previously planned.       Procedure(s) (LRB):  MIS DISCECTOMY, R L4-5 (N/A)     Goals of Care Treatment Preferences:         Consults:  N/A    Significant Diagnostic Studies: N/A    Pending Diagnostic Studies:       None          There are no hospital problems to display for this patient.     Discharged Condition: good     Disposition: Home or Self Care    Follow Up:   Future Appointments   Date Time Provider Department Center   2/1/2024 10:00 AM Lakesha Waite RN [facility]   2/26/2024  3:30 PM Lorie Juarez MD [facility]         Patient Instructions:      Notify your health care provider if you experience any of the following:  temperature >100.4     Notify your health care provider if you experience any of the following:  persistent nausea and vomiting or diarrhea     Notify your health care provider if you experience any of the following:  severe uncontrolled pain     Notify your health care provider if you experience any of the following:  redness, tenderness, or signs of infection (pain, swelling,  redness, odor or green/yellow discharge around incision site)     Notify your health care provider if you experience any of the following:  difficulty breathing or increased cough     Notify your health care provider if you experience any of the following:  severe persistent headache     Notify your health care provider if you experience any of the following:  worsening rash     Notify your health care provider if you experience any of the following:  persistent dizziness, light-headedness, or visual disturbances     Notify your health care provider if you experience any of the following:  increased confusion or weakness     Activity as tolerated     Medications:  Reconciled Home Medications:      Medication List        START taking these medications      methocarbamoL 500 MG Tab  Commonly known as: ROBAXIN  Take 1 tablet (500 mg total) by mouth 4 (four) times daily. for 10 days     methylPREDNISolone 4 mg tablet  Commonly known as: MEDROL DOSEPACK  use as directed            CONTINUE taking these medications      acetaminophen 500 MG tablet  Commonly known as: TYLENOL  Take 500 mg by mouth every 6 (six) hours as needed for Pain.     EScitalopram oxalate 20 MG tablet  Commonly known as: LEXAPRO  Take 20 mg by mouth.     gabapentin 300 MG capsule  Commonly known as: NEURONTIN  Take 2 capsules (600 mg total) by mouth 3 (three) times daily.     INNOPRAN XL 80 mg 24 hr capsule  Generic drug: propranoloL  Take 80 mg by mouth every evening.     medroxyPROGESTERone 10 MG tablet  Commonly known as: PROVERA  Take 1 tablet (10 mg total) by mouth once daily.            STOP taking these medications      tiZANidine 4 MG tablet  Commonly known as: ZANAFLEX              Sonia Enriquez PA-C  Neurosurgery  [facility] - Surgery (2nd Fl)

## 2024-01-22 NOTE — OP NOTE
DATE OF PROCEDURE: 1/17/2024     PREOPERATIVE DIAGNOSES:   Right L4-L5 herniated nucleus pulposis with radiculopathy.    POSTOPERATIVE DIAGNOSES:   Right L4-L5 herniated nucleus pulposis with radiculopathy.    PROCEDURES PERFORMED:   Minimally invasive right L4-L5 hemilaminotomy, foraminotomy and microdiskectomy.     Surgeon(s) and Role:     * Lorie Juarez MD - Primary     * Marium Arreola MD - co-surgeon    Two staff neurosurgeons were necessary for the case because there was no resident physician available to assist in the case.    ANESTHESIA: General    ESTIMATED BLOOD LOSS: 50 mL.    INDICATION FOR PROCEDURE: Dexter Gupta is a 23 y.o. female with a history of an L4-L5 herniated nucleus pulposus with left lower extremity radiculopathy.  She underwent a left L4-L5 microdiskectomy in February 2023.  Her left leg pain resolved but more recently she developed right lower extremity radicular pain.  Imaging studies showed a recurrent disc herniation at the L4-L5 level with significant central canal stenosis and right L4-5 neural foraminal narrowing.  After failing conservative therapy, the decision was made to bring the patient back to the operating room for a right L4-5 minimally invasive microdiskectomy.      OPERATIVE NOTE: After obtaining informed consent, the patient was brought into the Operating Room. she was intubated and Anesthetized by Anesthesia. Preoperative antibiotics as well as dexamethasone were administered. The patient was placed prone on the Harpal table with the Lenny frame. Neuromonitoring needles were placed and baselines were obtained. The back was then prepped and draped in the standard sterile fashion. Fluoroscopy was brought into the field to confirm the appropriate level on the right side at the L4-L5 facet joint. At this level, an approximately 2 cm paramedian incision was made at the level of the L4 lamina. The METRx tube was parked on the L4 lamina and the right L4-L5 facet joint.  Once the METRx tube was in position, we brought in the operating microscope. Using microsurgical technique, soft tissue was removed overlying the lamina and the facet joint of L4-L5. We were able to identify the inferior aspect of the L4 lamina and the medial aspect of the right L4-L5 facet joint. We then used a high-speed drill to perform an ipsilateral laminotomy and partial medial facetectomy. Ligamentum flavum was identified and Kerrison rongeurs were then used to remove the ligamentum flavum. The thecal sac and nerve root were identified and the nerve root was retracted medially. There were significant engorged epidural veins, which were cauterized using bipolar electrocautery. The disk space was identified and a #15 blade was used to incise the disk space. The diskectomy was performed in the normal fashion using curettes and pituitary rongeurs. Once we were satisfied with the diskectomy, the disk space was again probed to ensure there were no remaining disk fragments that were left behind. A foraminotomy was performed using Kerrison rongeurs to ensure there was no compression of the nerve root. Once we were happy with the decompression, a Gates was used to pass underneath the nerve root and into the foramen, which all seemed to be free from compression. Hemostasis was obtained using FloSeal and bipolar electrocautery. The wound was thoroughly irrigated. Depo-Medrol was placed in the wound over the dura. The METRx tube was removed and the wound was closed in layers. The patient was flipped back supine onto the stretcher and extubated by Anesthesia. She was brought to the Recovery Room in stable condition. All counts were correct at the end of the case and neuromonitoring remained stable throughout the case.

## 2024-01-23 ENCOUNTER — TELEPHONE (OUTPATIENT)
Dept: NEUROSURGERY | Facility: CLINIC | Age: 24
End: 2024-01-23
Payer: MEDICAID

## 2024-01-23 NOTE — TELEPHONE ENCOUNTER
Spoke to pt to confirm post-op appts. Explained she will be seeing DAVE Maria on 2/26 due to change in Dr. Juarez's surgery schedule. Pt confirmed dates/times/locations of 2 week and 6 week appt

## 2024-01-31 ENCOUNTER — PATIENT MESSAGE (OUTPATIENT)
Dept: OBSTETRICS AND GYNECOLOGY | Facility: CLINIC | Age: 24
End: 2024-01-31
Payer: MEDICAID

## 2024-02-01 ENCOUNTER — CLINICAL SUPPORT (OUTPATIENT)
Dept: NEUROSURGERY | Facility: CLINIC | Age: 24
End: 2024-02-01
Payer: MEDICAID

## 2024-02-01 ENCOUNTER — PATIENT MESSAGE (OUTPATIENT)
Dept: NEUROSURGERY | Facility: CLINIC | Age: 24
End: 2024-02-01

## 2024-02-01 DIAGNOSIS — Z98.890 S/P LUMBAR DISCECTOMY: Primary | ICD-10-CM

## 2024-02-01 NOTE — PROGRESS NOTES
Patient seen via video visit  for 2 week post op s/p R L4-5 discectomy with Dr Juarez 01/17/2024. Patient states pain is 2/10. Her pre-op radicular pain is gone       Incision on lumbar spine assessed, no redness, swelling, or drainage, edges well approximated.     Waiting on picture to be sent    Patient was instructed as follows:   Discontinue Bacitracin after tonight.  May shower normally but pat dry after shower.  Do not submerge wound in bath tub or go swimming until released by the physician  Keep incision clean, dry and open to air as much as possible.  Patient encouraged to walk as much as possible but advised to walk with family member or friend and rest as necessary.  No lifting >10lbs.  Avoid bending and twisting the area of your surgery more than 45 degrees from neutral position in any direction.      Patient verbalized understanding of all instructions.    All questions were answered. Patient will follow up with Angelika Linton PA-C 02/26/2024 . Patient was encouraged to call clinic with any future concerns prior to follow up appt. If any worsening symptoms, patient should report to ED.       Lakesha Waite, MSN, BSN, RN  Neurosurgery Nurse Navigator

## 2024-02-16 ENCOUNTER — PATIENT MESSAGE (OUTPATIENT)
Dept: NEUROSURGERY | Facility: CLINIC | Age: 24
End: 2024-02-16
Payer: MEDICAID

## 2024-02-26 ENCOUNTER — OFFICE VISIT (OUTPATIENT)
Dept: NEUROSURGERY | Facility: CLINIC | Age: 24
End: 2024-02-26
Payer: MEDICAID

## 2024-02-26 VITALS
SYSTOLIC BLOOD PRESSURE: 143 MMHG | BODY MASS INDEX: 41.32 KG/M2 | HEIGHT: 65 IN | HEART RATE: 106 BPM | WEIGHT: 248 LBS | DIASTOLIC BLOOD PRESSURE: 89 MMHG

## 2024-02-26 DIAGNOSIS — M47.27 OTHER SPONDYLOSIS WITH RADICULOPATHY, LUMBOSACRAL REGION: ICD-10-CM

## 2024-02-26 DIAGNOSIS — M51.26 LUMBAR DISC HERNIATION: Primary | ICD-10-CM

## 2024-02-26 PROCEDURE — 99024 POSTOP FOLLOW-UP VISIT: CPT | Mod: ,,, | Performed by: PHYSICIAN ASSISTANT

## 2024-02-26 PROCEDURE — 3079F DIAST BP 80-89 MM HG: CPT | Mod: CPTII,,, | Performed by: PHYSICIAN ASSISTANT

## 2024-02-26 PROCEDURE — 1159F MED LIST DOCD IN RCRD: CPT | Mod: CPTII,,, | Performed by: PHYSICIAN ASSISTANT

## 2024-02-26 PROCEDURE — 99999 PR PBB SHADOW E&M-EST. PATIENT-LVL III: CPT | Mod: PBBFAC,,, | Performed by: PHYSICIAN ASSISTANT

## 2024-02-26 PROCEDURE — 99213 OFFICE O/P EST LOW 20 MIN: CPT | Mod: PBBFAC | Performed by: PHYSICIAN ASSISTANT

## 2024-02-26 PROCEDURE — 3077F SYST BP >= 140 MM HG: CPT | Mod: CPTII,,, | Performed by: PHYSICIAN ASSISTANT

## 2024-02-26 RX ORDER — METHOCARBAMOL 750 MG/1
TABLET, FILM COATED ORAL
COMMUNITY
Start: 2023-12-14 | End: 2024-06-05

## 2024-02-26 NOTE — PROGRESS NOTES
Neurosurgery  Established Patient    SUBJECTIVE:     History of Present Illness:  Dexter Gupta is a 23 y.o. female with PMH of left L4/5 MIS microdiscectomy on 2/10/23 for left leg radiculopathy with associated left foot drop who subsequently developed new right leg pain with recurrent disc herniation noted on imaging. She is now s/p right L4/5 microdiscectomy on 1/17/24.     Pt presents for 6 wk postop visit. Overall she is doing very well, reports resolution of her RLE radicular pain following surgery and has not recurred. She has mild intermittent back pain around the incision site that does not radiate, rated at 1-2/10. She just takes Tylenol/ibuprofen OTC as needed. Remains on gabapentin which she has been on long-term, still has occasional burning/tingling sensation at her left anterior shin which has been present since prior to her first discectomy.     Review of patient's allergies indicates:  No Known Allergies    Current Outpatient Medications   Medication Sig Dispense Refill    acetaminophen (TYLENOL) 500 MG tablet Take 500 mg by mouth every 6 (six) hours as needed for Pain.      EScitalopram oxalate (LEXAPRO) 20 MG tablet Take 20 mg by mouth.      gabapentin (NEURONTIN) 300 MG capsule Take 2 capsules (600 mg total) by mouth 3 (three) times daily. 180 capsule 11    INNOPRAN XL 80 mg 24 hr capsule Take 80 mg by mouth every evening.      medroxyPROGESTERone (PROVERA) 10 MG tablet Take 1 tablet (10 mg total) by mouth once daily. (Patient taking differently: Take 10 mg by mouth once daily. Takes 1st 1-0 days of the month) 30 tablet 12    methocarbamoL (ROBAXIN) 750 MG Tab       oxyCODONE (ROXICODONE) 5 MG immediate release tablet Take 1 tablet (5 mg total) by mouth every 6 (six) hours as needed for Pain. (Patient not taking: Reported on 2/26/2024) 12 tablet 0     No current facility-administered medications for this visit.       Past Medical History:   Diagnosis Date    Anxiety     Asthma     Other  "spondylosis with radiculopathy, lumbosacral region 10/17/2022     Past Surgical History:   Procedure Laterality Date    MICRODISCECTOMY OF SPINE N/A 2/10/2023    Procedure: LEFT L4-L5 MICRODISCECTOMY, SPINE;  Surgeon: Lorie Juarez MD;  Location: [facility] OR [address] [place] [address];  Service: Neurosurgery;  Laterality: N/A;    MINIMALLY INVASIVE SURGICAL REMOVAL OF INTERVERTEBRAL DISC OF SPINE USING MICROSCOPE N/A 1/17/2024    Procedure: MIS DISCECTOMY, R L4-5;  Surgeon: Lorie Juarez MD;  Location: [facility] OR [address] [place] [address];  Service: Neurosurgery;  Laterality: N/A;  NEUROMONITORING: SEP  BED: FLAT TOP CHERI TABLE  HEAD REST: PRONE VIEW  RADIOLOGY: C-ARM    TONSILLECTOMY      TYMPANOSTOMY TUBE PLACEMENT Bilateral     3 separates operations; 1998, 1999, 2001     Family History       Problem Relation (Age of Onset)    Cancer Father    Celiac disease Mother          Social History     Socioeconomic History    Marital status: Significant Other     Spouse name: Boaz    Number of children: 0   Tobacco Use    Smoking status: Never     Passive exposure: Never    Smokeless tobacco: Never   Substance and Sexual Activity    Alcohol use: Not Currently     Comment: rare    Drug use: Never    Sexual activity: Yes     Partners: Male     Birth control/protection: Patch, Condom   Social History Narrative    Stairs- 15       Review of Systems  Positive per HPI, otherwise a pertinent ROS was performed and was negative.        OBJECTIVE:     Vital Signs  Pulse: 106  BP: (!) 143/89  Pain Score:   2  Height: 5' 5" (165.1 cm)  Weight: 112.5 kg (248 lb)  Body mass index is 41.27 kg/m².    Neurosurgery Physical Exam  General: well developed, well nourished, no distress.   Neurologic: Alert and oriented. Thought content appropriate.  Language: No aphasia  Speech: No dysarthria  Cranial nerves: face symmetric, tongue midline, CN II-XII grossly intact.   Pulmonary: normal respirations, no signs of respiratory distress  Abdomen: soft, non-distended, not tender to " palpation  Skin: Skin is warm, dry and intact.    Sensory: intact to light touch throughout  Motor Strength:     Strength            Iliopsoas Quadriceps Knee  Flexion Tibialis  anterior Gastro- cnemius EHL   Lower: R 5/5 5/5 5/5 5/5 5/5 5/5    L 5/5 5/5 5/5 5/5 5/5 5/5     Gait: stable, fluid, ambulates independently, non-antalgic     Lumbar Incision: C/D/I with skin edges well approximated, healing appropriately. No TTP, erythema, or underlying edema.        Diagnostic Results:  No new imaging      ASSESSMENT/PLAN:     Dexter Gupta is a 23 y.o. female s/p left L4/5 MIS microdiscectomy on 2/10/23 for left leg radiculopathy with associated left foot drop who subsequently developed new right leg pain with recurrent disc herniation noted on imaging. She is now s/p right L4/5 microdiscectomy on 1/17/24. Doing well postoperatively with resolution of radicular leg pain.    - RTC 3 months postop, or sooner with any new symptoms/concerns   - Okay to increase some physical activity gradually as tolerated, but continue lifting restrictions              Diagnoses addressed and orders placed this encounter:      Lumbar disc herniation    Other spondylosis with radiculopathy, lumbosacral region          Angelika Linton PA-C  Neurosurgery  [facility]

## 2024-03-04 ENCOUNTER — PATIENT MESSAGE (OUTPATIENT)
Dept: NEUROSURGERY | Facility: CLINIC | Age: 24
End: 2024-03-04
Payer: MEDICAID

## 2024-03-19 ENCOUNTER — DOCUMENTATION ONLY (OUTPATIENT)
Dept: REHABILITATION | Facility: HOSPITAL | Age: 24
End: 2024-03-19
Payer: MEDICAID

## 2024-03-19 PROBLEM — M54.42 CHRONIC LEFT-SIDED LOW BACK PAIN WITH LEFT-SIDED SCIATICA: Status: RESOLVED | Noted: 2022-08-25 | Resolved: 2024-03-19

## 2024-03-19 PROBLEM — Z74.09 IMPAIRED MOBILITY: Status: RESOLVED | Noted: 2023-03-24 | Resolved: 2024-03-19

## 2024-03-19 PROBLEM — G89.29 CHRONIC LEFT-SIDED LOW BACK PAIN WITH LEFT-SIDED SCIATICA: Status: RESOLVED | Noted: 2022-08-25 | Resolved: 2024-03-19

## 2024-03-19 PROBLEM — R53.1 DECREASED STRENGTH: Status: RESOLVED | Noted: 2023-03-24 | Resolved: 2024-03-19

## 2024-03-19 NOTE — PROGRESS NOTES
Patient was evaluated on 3/24/2023 and was seen 20 times for physical therapy. Patient has not attended physical therapy since 2024. Patient given home exercise program. Plan of care and/or authorization . Patient to be discharged at this time.

## 2024-05-27 ENCOUNTER — OFFICE VISIT (OUTPATIENT)
Dept: NEUROSURGERY | Facility: CLINIC | Age: 24
End: 2024-05-27
Payer: MEDICAID

## 2024-05-27 VITALS — DIASTOLIC BLOOD PRESSURE: 82 MMHG | SYSTOLIC BLOOD PRESSURE: 118 MMHG | HEART RATE: 89 BPM

## 2024-05-27 DIAGNOSIS — Z98.890 S/P LUMBAR DISCECTOMY: Primary | ICD-10-CM

## 2024-05-27 PROCEDURE — 99212 OFFICE O/P EST SF 10 MIN: CPT | Mod: S$PBB,,, | Performed by: STUDENT IN AN ORGANIZED HEALTH CARE EDUCATION/TRAINING PROGRAM

## 2024-05-27 PROCEDURE — 99213 OFFICE O/P EST LOW 20 MIN: CPT | Mod: PBBFAC | Performed by: STUDENT IN AN ORGANIZED HEALTH CARE EDUCATION/TRAINING PROGRAM

## 2024-05-27 PROCEDURE — 1159F MED LIST DOCD IN RCRD: CPT | Mod: CPTII,,, | Performed by: STUDENT IN AN ORGANIZED HEALTH CARE EDUCATION/TRAINING PROGRAM

## 2024-05-27 PROCEDURE — 3074F SYST BP LT 130 MM HG: CPT | Mod: CPTII,,, | Performed by: STUDENT IN AN ORGANIZED HEALTH CARE EDUCATION/TRAINING PROGRAM

## 2024-05-27 PROCEDURE — 1160F RVW MEDS BY RX/DR IN RCRD: CPT | Mod: CPTII,,, | Performed by: STUDENT IN AN ORGANIZED HEALTH CARE EDUCATION/TRAINING PROGRAM

## 2024-05-27 PROCEDURE — 99999 PR PBB SHADOW E&M-EST. PATIENT-LVL III: CPT | Mod: PBBFAC,,, | Performed by: STUDENT IN AN ORGANIZED HEALTH CARE EDUCATION/TRAINING PROGRAM

## 2024-05-27 PROCEDURE — 3079F DIAST BP 80-89 MM HG: CPT | Mod: CPTII,,, | Performed by: STUDENT IN AN ORGANIZED HEALTH CARE EDUCATION/TRAINING PROGRAM

## 2024-05-30 NOTE — PROGRESS NOTES
Neurosurgery  Established Patient    SUBJECTIVE:     History of Present Illness:  Dexter Gupta is a 23 y.o. female with PMH of left L4/5 MIS microdiscectomy on 2/10/23 for left leg radiculopathy with associated left foot drop who subsequently developed new right leg pain with recurrent disc herniation noted on imaging. She is now s/p right L4/5 microdiscectomy on 1/17/24.     Dexter returns today with her mother.  She is doing very well without recurrence of radicular leg pain.  She has occasional lower back pain that is not severe and does not bother her greatly.  No weakness or other neurological concerns.  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    Review of patient's allergies indicates:  No Known Allergies    Current Outpatient Medications   Medication Sig Dispense Refill    EScitalopram oxalate (LEXAPRO) 20 MG tablet Take 20 mg by mouth.      INNOPRAN XL 80 mg 24 hr capsule Take 80 mg by mouth every evening.      medroxyPROGESTERone (PROVERA) 10 MG tablet Take 1 tablet (10 mg total) by mouth once daily. (Patient taking differently: Take 10 mg by mouth once daily. Takes 1st 1-0 days of the month) 30 tablet 12    acetaminophen (TYLENOL) 500 MG tablet Take 500 mg by mouth every 6 (six) hours as needed for Pain. (Patient not taking: Reported on 5/27/2024)      gabapentin (NEURONTIN) 300 MG capsule Take 2 capsules (600 mg total) by mouth 3 (three) times daily. 180 capsule 11    methocarbamoL (ROBAXIN) 750 MG Tab  (Patient not taking: Reported on 5/27/2024)      oxyCODONE (ROXICODONE) 5 MG immediate release tablet Take 1 tablet (5 mg total) by mouth every 6 (six) hours as needed for Pain. (Patient not taking: Reported on 2/26/2024) 12 tablet 0     No current facility-administered medications for this visit.       Past Medical History:   Diagnosis Date    Anxiety     Asthma     Other spondylosis with radiculopathy, lumbosacral region 10/17/2022     Past  Surgical History:   Procedure Laterality Date    MICRODISCECTOMY OF SPINE N/A 2/10/2023    Procedure: LEFT L4-L5 MICRODISCECTOMY, SPINE;  Surgeon: Lorie Juarez MD;  [facility];  Service: Neurosurgery;  Laterality: N/A;    MINIMALLY INVASIVE SURGICAL REMOVAL OF INTERVERTEBRAL DISC OF SPINE USING MICROSCOPE N/A 1/17/2024    Procedure: MIS DISCECTOMY, R L4-5;  Surgeon: Lroie Juarez MD;  [facility];  Service: Neurosurgery;  Laterality: N/A;  NEUROMONITORING: SEP  BED: FLAT TOP CHERI TABLE  HEAD REST: PRONE VIEW  RADIOLOGY: C-ARM    TONSILLECTOMY      TYMPANOSTOMY TUBE PLACEMENT Bilateral     3 separates operations; 1998, 1999, 2001     Family History       Problem Relation (Age of Onset)    Cancer Father    Celiac disease Mother          Social History     Socioeconomic History    Marital status: Significant Other     Spouse name: Boaz    Number of children: 0   Tobacco Use    Smoking status: Never     Passive exposure: Never    Smokeless tobacco: Never   Substance and Sexual Activity    Alcohol use: Not Currently     Comment: rare    Drug use: Never    Sexual activity: Yes     Partners: Male     Birth control/protection: Patch, Condom   Social History Narrative    Stairs- 15     Social Determinants of Health     Financial Resource Strain: Patient Declined (3/4/2024)    Received from [facility]    Overall Financial Resource Strain (CARDIA)     Difficulty of Paying Living Expenses: Patient declined   Food Insecurity: Patient Declined (3/4/2024)    Received from [facility]    Hunger Vital Sign     Worried About Running Out of Food in the Last Year: Patient declined     Ran Out of Food in the Last Year: Patient declined   Transportation Needs: Patient Declined (3/4/2024)    Received from [facility]    PRAPARE - Transportation     Lack of Transportation (Medical): Patient declined     Lack of Transportation (Non-Medical): Patient declined    Physical Activity: Insufficiently Active (3/4/2024)    Received from [facility]    Exercise Vital Sign     Days of Exercise per Week: 2 days     Minutes of Exercise per Session: 30 min   Stress: Stress Concern Present (3/4/2024)    Received from [facility]    [employer] [place] [employer] - Occupational Stress Questionnaire     Feeling of Stress : Very much   Housing Stability: Patient Declined (3/4/2024)    Received from [facility], [facility]    Housing Stability Vital Sign     Unable to Pay for Housing in the Last Year: Patient declined     In the last 12 months, was there a time when you did not have a steady place to sleep or slept in a shelter (including now)?: Patient declined       Review of Systems    OBJECTIVE:     Vital Signs  Pulse: 89  BP: 118/82  Pain Score:   2  There is no height or weight on file to calculate BMI.    Neurosurgery Physical Exam  General: well developed, well nourished, no distress.     Neuro:  Mental Status: Alert and oriented. Oriented x 4  Language/language: No aphasia. No dysarthria.   Cranial nerves: PERRL, EOMI, face symmetric   Sensory: intact to light touch throughout  Motor Strength:    Iliopsoas Quadriceps Knee  Flexion Tibialis  anterior Gastro- cnemius EHL   Lower: R 5/5 5/5 5/5 5/5 5/5 5/5    L 5/5 5/5 5/5 5/5 5/5 5/5   Gait stable. Able to walk on heels & toes  Spine: No midline TTP over cervical, thoracic or lumbar spine. Paramedian lumbar incisions healing well      Diagnostic Results:  No new imaging    ASSESSMENT/PLAN:     23 y.o. female with PMH of left L4/5 MIS microdiscectomy on 2/10/23 for left leg radiculopathy with associated left foot drop who subsequently developed new right leg pain with recurrent disc herniation noted on imaging. She is now s/p right L4/5 microdiscectomy on 1/17/24 and doing very well without recurrence of her prior radicular leg pain.  We discussed continued strategies for healthy back practices  in quitting continued core strengthening/ conditioning exercises and weight loss.  Follow up in 6 months        Note dictated with voice recognition software, please excuse any grammatical errors.

## 2024-06-05 ENCOUNTER — OFFICE VISIT (OUTPATIENT)
Dept: URGENT CARE | Facility: CLINIC | Age: 24
End: 2024-06-05
Payer: MEDICAID

## 2024-06-05 VITALS
HEART RATE: 95 BPM | HEIGHT: 65 IN | RESPIRATION RATE: 18 BRPM | SYSTOLIC BLOOD PRESSURE: 104 MMHG | DIASTOLIC BLOOD PRESSURE: 76 MMHG | TEMPERATURE: 99 F | WEIGHT: 248 LBS | BODY MASS INDEX: 41.32 KG/M2 | OXYGEN SATURATION: 97 %

## 2024-06-05 DIAGNOSIS — J06.9 VIRAL URI WITH COUGH: Primary | ICD-10-CM

## 2024-06-05 LAB
CTP QC/QA: YES
SARS-COV-2 AG RESP QL IA.RAPID: NEGATIVE

## 2024-06-05 PROCEDURE — 99213 OFFICE O/P EST LOW 20 MIN: CPT | Mod: S$GLB,,, | Performed by: PHYSICIAN ASSISTANT

## 2024-06-05 PROCEDURE — 87811 SARS-COV-2 COVID19 W/OPTIC: CPT | Mod: QW,S$GLB,, | Performed by: PHYSICIAN ASSISTANT

## 2024-06-05 RX ORDER — PROMETHAZINE HYDROCHLORIDE AND DEXTROMETHORPHAN HYDROBROMIDE 6.25; 15 MG/5ML; MG/5ML
5 SYRUP ORAL NIGHTLY PRN
Qty: 50 ML | Refills: 0 | Status: SHIPPED | OUTPATIENT
Start: 2024-06-05 | End: 2024-06-15

## 2024-06-05 RX ORDER — BENZONATATE 200 MG/1
200 CAPSULE ORAL 3 TIMES DAILY PRN
Qty: 30 CAPSULE | Refills: 0 | Status: SHIPPED | OUTPATIENT
Start: 2024-06-05 | End: 2024-06-15

## 2024-06-05 NOTE — PROGRESS NOTES
"Subjective:      Patient ID: Dexter Gupta is a 23 y.o. female.    Vitals:  height is 5' 5" (1.651 m) and weight is 112.5 kg (248 lb 0.3 oz). Her oral temperature is 98.6 °F (37 °C). Her blood pressure is 104/76 and her pulse is 95. Her respiration is 18 and oxygen saturation is 97%.     Chief Complaint: Cough    Pt arrives for cough, sore throat,congestion,chills,and headache. Symptoms started 1 week ago . At home tx included nyquil with no relief.    Cough  This is a new problem. The current episode started 1 to 4 weeks ago. The problem has been rapidly worsening. Cough characteristics: green sputum. Associated symptoms include chills, headaches, nasal congestion, postnasal drip and a sore throat. Pertinent negatives include no chest pain, ear pain, fever, myalgias, rash or shortness of breath. Treatments tried: nyquil. The treatment provided no relief. Her past medical history is significant for asthma, bronchitis and pneumonia.       Constitution: Positive for chills. Negative for fever.   HENT:  Positive for congestion, postnasal drip and sore throat. Negative for ear pain, ear discharge, sinus pain and sinus pressure.    Cardiovascular:  Negative for chest pain.   Respiratory:  Positive for cough. Negative for sputum production and shortness of breath.    Gastrointestinal:  Negative for abdominal pain, nausea, vomiting, constipation and diarrhea.   Musculoskeletal:  Negative for muscle ache.   Skin:  Negative for rash.   Neurological:  Positive for headaches.      Past Medical History:   Diagnosis Date    Anxiety     Asthma     Other spondylosis with radiculopathy, lumbosacral region 10/17/2022       Past Surgical History:   Procedure Laterality Date    MICRODISCECTOMY OF SPINE N/A 2/10/2023    Procedure: LEFT L4-L5 MICRODISCECTOMY, SPINE;  Surgeon: Lorie Juarez MD;  Location: [facility] OR [address] [place] [address];  Service: Neurosurgery;  Laterality: N/A;    MINIMALLY INVASIVE SURGICAL REMOVAL OF INTERVERTEBRAL DISC OF SPINE " USING MICROSCOPE N/A 1/17/2024    Procedure: MIS DISCECTOMY, R L4-5;  Surgeon: Lorie Juarez MD;  Location: [facility] OR [address] [place] [address];  Service: Neurosurgery;  Laterality: N/A;  NEUROMONITORING: SEP  BED: FLAT TOP CHERI TABLE  HEAD REST: PRONE VIEW  RADIOLOGY: C-ARM    TONSILLECTOMY      TYMPANOSTOMY TUBE PLACEMENT Bilateral     3 separates operations; 1998, 1999, 2001       Family History   Problem Relation Name Age of Onset    Celiac disease Mother      Cancer Father         Social History     Socioeconomic History    Marital status: Significant Other     Spouse name: Boaz    Number of children: 0   Tobacco Use    Smoking status: Never     Passive exposure: Never    Smokeless tobacco: Never   Substance and Sexual Activity    Alcohol use: Not Currently     Comment: rare    Drug use: Never    Sexual activity: Yes     Partners: Male     Birth control/protection: Patch, Condom   Social History Narrative    Stairs- 13     Social Determinants of Health     Financial Resource Strain: Patient Declined (3/4/2024)    Received from [facility] [employer] [facility]    Overall Financial Resource Strain (CARDIA)     Difficulty of Paying Living Expenses: Patient declined   Food Insecurity: Patient Declined (3/4/2024)    Received from [facility] [employer] [facility]    Hunger Vital Sign     Worried About Running Out of Food in the Last Year: Patient declined     Ran Out of Food in the Last Year: Patient declined   Transportation Needs: Patient Declined (3/4/2024)    Received from [facility] [employer] [facility]    PRAPARE - Transportation     Lack of Transportation (Medical): Patient declined     Lack of Transportation (Non-Medical): Patient declined   Physical Activity: Insufficiently Active (3/4/2024)    Received from [facility] [employer] [facility]    Exercise Vital Sign     Days of Exercise per Week: 2 days     Minutes of Exercise per Session: 30 min   Stress: Stress Concern Present (3/4/2024)    Received from [facility] [employer] [facility]  of Occupational Health - Occupational Stress Questionnaire     Feeling of Stress : Very much   Housing Stability: Patient Declined (3/4/2024)    [facility]    Housing Stability Vital Sign     Unable to Pay for Housing in the Last Year: Patient declined     In the last 12 months, was there a time when you did not have a steady place to sleep or slept in a shelter (including now)?: Patient declined       Current Outpatient Medications   Medication Sig Dispense Refill    EScitalopram oxalate (LEXAPRO) 20 MG tablet Take 20 mg by mouth.      INNOPRAN XL 80 mg 24 hr capsule Take 80 mg by mouth every evening.      gabapentin (NEURONTIN) 300 MG capsule Take 2 capsules (600 mg total) by mouth 3 (three) times daily. 180 capsule 11    medroxyPROGESTERone (PROVERA) 10 MG tablet Take 1 tablet (10 mg total) by mouth once daily. (Patient taking differently: Take 10 mg by mouth once daily. Takes 1st 1-0 days of the month) 30 tablet 12     No current facility-administered medications for this visit.       Review of patient's allergies indicates:  No Known Allergies    Objective:     Physical Exam   Constitutional: She is oriented to person, place, and time. She appears well-developed. She is cooperative.  Non-toxic appearance. She does not appear ill. No distress.   HENT:   Head: Normocephalic and atraumatic.   Ears:   Right Ear: Hearing, tympanic membrane, external ear and ear canal normal. no impacted cerumen  Left Ear: Hearing, tympanic membrane, external ear and ear canal normal. no impacted cerumen  Nose: Congestion present. No mucosal edema, rhinorrhea or nasal deformity. No epistaxis. Right sinus exhibits no maxillary sinus tenderness and no frontal sinus tenderness. Left sinus exhibits no maxillary sinus tenderness and no frontal sinus tenderness.   Mouth/Throat: Uvula is midline and mucous membranes are normal. Mucous membranes are moist. No trismus in the jaw. Normal dentition. No uvula  swelling. Posterior oropharyngeal erythema present. No oropharyngeal exudate or posterior oropharyngeal edema.   Eyes: Conjunctivae and lids are normal. Right eye exhibits no discharge. Left eye exhibits no discharge. No scleral icterus.   Neck: Trachea normal and phonation normal. Neck supple. No edema present. No erythema present. No neck rigidity present.   Cardiovascular: Normal rate, regular rhythm, normal heart sounds and normal pulses.   No murmur heard.Exam reveals no gallop and no friction rub.   Pulmonary/Chest: Effort normal and breath sounds normal. No stridor. No respiratory distress. She has no decreased breath sounds. She has no wheezes. She has no rhonchi. She has no rales.   Abdominal: Normal appearance.   Musculoskeletal:      Cervical back: She exhibits no tenderness.   Lymphadenopathy:     She has no cervical adenopathy.   Neurological: She is alert and oriented to person, place, and time. She exhibits normal muscle tone.   Skin: Skin is warm, dry, intact, not diaphoretic, not pale and no rash.   Psychiatric: Her speech is normal and behavior is normal. Mood, judgment and thought content normal.   Nursing note and vitals reviewed.    Results for orders placed or performed in visit on 06/05/24   SARS Coronavirus 2 Antigen, POCT Manual Read   Result Value Ref Range    SARS Coronavirus 2 Antigen Negative Negative     Acceptable Yes          Assessment:     1. Viral URI with cough        Plan:       Viral URI with cough  -     SARS Coronavirus 2 Antigen, POCT Manual Read  -     benzonatate (TESSALON) 200 MG capsule; Take 1 capsule (200 mg total) by mouth 3 (three) times daily as needed for Cough.  Dispense: 30 capsule; Refill: 0  -     promethazine-dextromethorphan (PROMETHAZINE-DM) 6.25-15 mg/5 mL Syrp; Take 5 mLs by mouth nightly as needed (cough).  Dispense: 50 mL; Refill: 0    Results reviewed  I have reviewed the patient chart and pertinent past imaging/labs.      Patient  "Instructions                                                            URI   If your condition worsens or fails to improve we recommend that you receive another evaluation at the urgent care/ER immediately or contact your PCP to discuss your concerns. You must understand that you've received an urgent care treatment only and that you may be released before all your medical problems are known or treated. You the patient will arrange for follouwp care as instructed.     If we discussed that I think your illness is viral, it will not respond to antibiotics and will last 10-14 days.     Use Tessalon as needed for cough during the day.  Use cough syrup at night this can be sedating please do not drink alcohol or drive with this medication  Zyrtec D, Claritin D or Allegra D can also help with symptoms of congestion and drainage.   If you have hypertension avoid using the "D" which is the decongestant   If you just have drainage you can take plain zyrtec, claritin or allegra   Use Flonase for postnasal drip and nasal congestion  Rest and fluids are also important.     Salt water gargles, warm tea with honey and chloraseptic spray as needed for sore throat.   Tylenol or ibuprofen can also be used as directed for pain unless you have an allergy to them or medical condition such as stomach ulcers, kidney or liver disease or blood thinners etc for which you should not be taking these type of medications.                     "

## 2024-06-05 NOTE — PROGRESS NOTES
"Subjective:      Patient ID: Dexter Gupta is a 23 y.o. female.    Vitals:  height is 5' 5" (1.651 m) and weight is 112.5 kg (248 lb 0.3 oz). Her oral temperature is 98.6 °F (37 °C). Her blood pressure is 104/76 and her pulse is 95. Her respiration is 18 and oxygen saturation is 97%.     Chief Complaint: Cough    Pt arrives for cough, sore throat,congestion,chills,and headache. Symptoms started 1 week ago . At home tx included nyquil with no relief.    Cough  This is a new problem. The current episode started 1 to 4 weeks ago. The problem has been rapidly worsening. Cough characteristics: green sputum. Associated symptoms include chills, headaches, nasal congestion, postnasal drip and a sore throat. Treatments tried: nyquil. The treatment provided no relief. Her past medical history is significant for asthma, bronchitis and pneumonia.     Constitution: Positive for chills.   HENT:  Positive for postnasal drip and sore throat.    Respiratory:  Positive for cough.    Neurological:  Positive for headaches.    Objective:     Physical Exam    Assessment:     No diagnosis found.    Plan:       There are no diagnoses linked to this encounter.                "

## 2024-06-05 NOTE — PATIENT INSTRUCTIONS
"                                                         URI   If your condition worsens or fails to improve we recommend that you receive another evaluation at the urgent care/ER immediately or contact your PCP to discuss your concerns. You must understand that you've received an urgent care treatment only and that you may be released before all your medical problems are known or treated. You the patient will arrange for follouwp care as instructed.     If we discussed that I think your illness is viral, it will not respond to antibiotics and will last 10-14 days.     Use Tessalon as needed for cough during the day.  Use cough syrup at night this can be sedating please do not drink alcohol or drive with this medication  Zyrtec D, Claritin D or Allegra D can also help with symptoms of congestion and drainage.   If you have hypertension avoid using the "D" which is the decongestant   If you just have drainage you can take plain zyrtec, claritin or allegra   Use Flonase for postnasal drip and nasal congestion  Rest and fluids are also important.     Salt water gargles, warm tea with honey and chloraseptic spray as needed for sore throat.   Tylenol or ibuprofen can also be used as directed for pain unless you have an allergy to them or medical condition such as stomach ulcers, kidney or liver disease or blood thinners etc for which you should not be taking these type of medications.         "

## 2024-11-23 ENCOUNTER — PATIENT MESSAGE (OUTPATIENT)
Dept: NEUROSURGERY | Facility: CLINIC | Age: 24
End: 2024-11-23
Payer: MEDICAID

## 2024-11-25 ENCOUNTER — TELEPHONE (OUTPATIENT)
Dept: NEUROSURGERY | Facility: CLINIC | Age: 24
End: 2024-11-25
Payer: MEDICAID

## 2024-11-25 DIAGNOSIS — M54.9 DORSALGIA, UNSPECIFIED: ICD-10-CM

## 2024-11-25 DIAGNOSIS — Z98.890 S/P LUMBAR DISCECTOMY: Primary | ICD-10-CM

## 2024-12-02 ENCOUNTER — OFFICE VISIT (OUTPATIENT)
Dept: URGENT CARE | Facility: CLINIC | Age: 24
End: 2024-12-02
Payer: MEDICAID

## 2024-12-02 VITALS
SYSTOLIC BLOOD PRESSURE: 119 MMHG | TEMPERATURE: 99 F | RESPIRATION RATE: 20 BRPM | HEART RATE: 109 BPM | OXYGEN SATURATION: 96 % | BODY MASS INDEX: 41.32 KG/M2 | DIASTOLIC BLOOD PRESSURE: 75 MMHG | HEIGHT: 65 IN | WEIGHT: 248 LBS

## 2024-12-02 DIAGNOSIS — R05.9 COUGH, UNSPECIFIED TYPE: ICD-10-CM

## 2024-12-02 DIAGNOSIS — B96.89 ACUTE BACTERIAL RHINOSINUSITIS: Primary | ICD-10-CM

## 2024-12-02 DIAGNOSIS — J01.90 ACUTE BACTERIAL RHINOSINUSITIS: Primary | ICD-10-CM

## 2024-12-02 DIAGNOSIS — R06.2 WHEEZING: ICD-10-CM

## 2024-12-02 DIAGNOSIS — J02.9 SORE THROAT: ICD-10-CM

## 2024-12-02 LAB
CTP QC/QA: YES
SARS-COV-2 AG RESP QL IA.RAPID: NEGATIVE

## 2024-12-02 PROCEDURE — 94640 AIRWAY INHALATION TREATMENT: CPT | Mod: S$GLB,,,

## 2024-12-02 PROCEDURE — 87811 SARS-COV-2 COVID19 W/OPTIC: CPT | Mod: QW,S$GLB,,

## 2024-12-02 PROCEDURE — 99213 OFFICE O/P EST LOW 20 MIN: CPT | Mod: 25,S$GLB,,

## 2024-12-02 RX ORDER — CETIRIZINE HYDROCHLORIDE 10 MG/1
10 TABLET ORAL DAILY
Qty: 30 TABLET | Refills: 0 | Status: SHIPPED | OUTPATIENT
Start: 2024-12-02 | End: 2025-01-01

## 2024-12-02 RX ORDER — PREDNISONE 20 MG/1
20 TABLET ORAL 2 TIMES DAILY
Qty: 8 TABLET | Refills: 0 | Status: SHIPPED | OUTPATIENT
Start: 2024-12-02 | End: 2024-12-06

## 2024-12-02 RX ORDER — METFORMIN HYDROCHLORIDE 500 MG/1
500 TABLET, EXTENDED RELEASE ORAL 2 TIMES DAILY
COMMUNITY

## 2024-12-02 RX ORDER — BUPROPION HYDROCHLORIDE 150 MG/1
150 TABLET ORAL EVERY MORNING
COMMUNITY

## 2024-12-02 RX ORDER — PROMETHAZINE HYDROCHLORIDE AND DEXTROMETHORPHAN HYDROBROMIDE 6.25; 15 MG/5ML; MG/5ML
5 SYRUP ORAL EVERY 4 HOURS PRN
Qty: 118 ML | Refills: 0 | Status: SHIPPED | OUTPATIENT
Start: 2024-12-02 | End: 2024-12-12

## 2024-12-02 RX ORDER — ALBUTEROL SULFATE 0.83 MG/ML
2.5 SOLUTION RESPIRATORY (INHALATION)
Status: COMPLETED | OUTPATIENT
Start: 2024-12-02 | End: 2024-12-02

## 2024-12-02 RX ORDER — IPRATROPIUM BROMIDE 0.5 MG/2.5ML
0.5 SOLUTION RESPIRATORY (INHALATION)
Status: COMPLETED | OUTPATIENT
Start: 2024-12-02 | End: 2024-12-02

## 2024-12-02 RX ORDER — LIDOCAINE HYDROCHLORIDE 20 MG/ML
SOLUTION OROPHARYNGEAL EVERY 4 HOURS
Qty: 100 ML | Refills: 0 | Status: SHIPPED | OUTPATIENT
Start: 2024-12-02

## 2024-12-02 RX ORDER — AZITHROMYCIN 250 MG/1
TABLET, FILM COATED ORAL
Qty: 6 TABLET | Refills: 0 | Status: SHIPPED | OUTPATIENT
Start: 2024-12-02 | End: 2024-12-07

## 2024-12-02 RX ORDER — ALBUTEROL SULFATE 90 UG/1
1-2 INHALANT RESPIRATORY (INHALATION) EVERY 4 HOURS PRN
Qty: 18 G | Refills: 0 | Status: SHIPPED | OUTPATIENT
Start: 2024-12-02 | End: 2025-12-02

## 2024-12-02 RX ORDER — FLUTICASONE PROPIONATE 50 MCG
1 SPRAY, SUSPENSION (ML) NASAL 2 TIMES DAILY
Qty: 16 G | Refills: 0 | Status: SHIPPED | OUTPATIENT
Start: 2024-12-02

## 2024-12-02 RX ADMIN — IPRATROPIUM BROMIDE 0.5 MG: 0.5 SOLUTION RESPIRATORY (INHALATION) at 11:12

## 2024-12-02 RX ADMIN — ALBUTEROL SULFATE 2.5 MG: 0.83 SOLUTION RESPIRATORY (INHALATION) at 11:12

## 2024-12-02 NOTE — PATIENT INSTRUCTIONS
The CDC recommends should the patient develop a fever or starts to feel worse after they have returned to normal activities, they should return home and away from others for at least another 24 hours.     Please drink plenty of fluids.  Please get plenty of rest.  Please return here or go to the Emergency Department for any concerns or worsening of condition.  If you were prescribed prednisone and z-pack, please take them to completion.  Use oral lidocaine as needed for sore throat.   Recommended using albuterol inhaler at least twice today and then starting tomorrow use as needed every 4-6 hours. Use flonase nasal spray twice daily (use 30 minutes before bedtime at night) and take daily zyrtec as directed for five-ten days. Use promethazine-dm for cough as needed. Recommended taking vitamin D and zinc supplements for immune support.   Recommended for patient to drink hot tea with honey. Consider eating softer foods such as soup and broth for the next couple of days to prevent further throat irritation. Recommended for patient to refrain from acidic foods (such as tomatoes or caffeine) to prevent throat irritation for the next couple of days.   Recommend humidifier, hot showers for sinus drainage. Recommend elevating your head at night with two pillows to prevent post nasal drip and cough at night. Recommend over the counter benadryl allergy plus congestion that includes a nasal decongestant in it if you do not have good results with nasal spray at bed time.     If you do not have Hypertension or any history of palpitations, it is ok to take over the counter Sudafed or Mucinex D or Allegra-D or Claritin-D or Zyrtec-D.  If you do take one of the above, it is ok to combine that with plain over the counter Mucinex or Allegra or Claritin or Zyrtec.  If for example you are taking Zyrtec -D, you can combine that with Mucinex, but not Mucinex-D.  If you are taking Mucinex-D, you can combine that with plain Allegra or  Claritin or Zyrtec.   If you do have Hypertension or palpitations, it is safe to take Coricidin HBP for relief of sinus symptoms.  If not allergic, please take over the counter Tylenol (Acetaminophen) and/or Motrin (Ibuprofen) as directed for control of pain and/or fever.  Please follow up with your primary care doctor or specialist as needed.    If you  smoke, please stop smoking.

## 2024-12-02 NOTE — LETTER
December 2, 2024      [facility] [address]  [place] [address]  Phone: [phone]  Fax: [phone]       Patient: eDxter Gupta   YOB: 2000  Date of Visit: 12/02/2024    To Whom It May Concern:    Marquita Gupta  was at [facility] on 12/02/2024. The patient may return to work/school on 12/4/24 or sooner with no restrictions. If you have any questions or concerns, or if I can be of further assistance, please do not hesitate to contact me.    Sincerely,    Amy Francisco PA-C

## 2024-12-02 NOTE — PROGRESS NOTES
"Subjective:      Patient ID: Dexter Gupta is a 24 y.o. female.    Vitals:  height is 5' 5" (1.651 m) and weight is 112.5 kg (248 lb 0.3 oz). Her oral temperature is 98.7 °F (37.1 °C). Her blood pressure is 119/75 and her pulse is 109. Her respiration is 20 and oxygen saturation is 96%.     Chief Complaint: Cough    24-year-old female presents to the clinic today with chief complaint of dry cough causing her to gag, wheezing, subjective fever, rhinorrhea, sore throat from cough, and chills. Symptoms started 4 days ago and have not improved.  Patient has taken mucinex-dm and tylenol with minimal relief.  Denies any recent ill exposures. Denies any recent travel.  Denies history of seasonal allergies. She does have a hx of childhood asthma.  Denies any radiation of pain. Denies  body aches, chest pain, shortness of breath, abdominal pain, nausea, vomiting, diarrhea, or rashes.        Cough  This is a new problem. The current episode started in the past 7 days. The problem has been gradually worsening. The cough is Productive of sputum. Associated symptoms include chills, a fever, headaches, nasal congestion, a sore throat and wheezing. Pertinent negatives include no chest pain, ear congestion, ear pain, heartburn, hemoptysis, myalgias, postnasal drip, rash, rhinorrhea or shortness of breath. Nothing aggravates the symptoms. She has tried OTC cough suppressant for the symptoms. The treatment provided no relief. Her past medical history is significant for asthma, bronchitis (few yrs ago) and pneumonia (had it a year ago). There is no history of COPD or environmental allergies.       Constitution: Positive for chills and fever. Negative for activity change, sweating, fatigue, generalized weakness and international travel in last 60 days.   HENT:  Positive for sore throat. Negative for ear pain, congestion and postnasal drip.    Neck: Negative for neck pain.   Cardiovascular:  Negative for chest pain.   Eyes:  Negative " for eye pain.   Respiratory:  Positive for cough and wheezing. Negative for chest tightness, sputum production, bloody sputum, shortness of breath and asthma.    Gastrointestinal:  Negative for abdominal pain, nausea, vomiting, diarrhea and heartburn.   Genitourinary:  Negative for dysuria.   Musculoskeletal:  Negative for pain and muscle ache.   Skin:  Negative for rash.   Allergic/Immunologic: Negative for environmental allergies, seasonal allergies and asthma.   Neurological:  Positive for headaches. Negative for dizziness.   Psychiatric/Behavioral:  Negative for nervous/anxious. The patient is not nervous/anxious.       Objective:     Physical Exam   Constitutional: She is oriented to person, place, and time. She appears well-developed. She is cooperative.  Non-toxic appearance. She appears ill. No distress.   HENT:   Head: Normocephalic and atraumatic.   Ears:   Right Ear: Hearing, external ear and ear canal normal. A middle ear effusion is present.   Left Ear: Hearing, external ear and ear canal normal. A middle ear effusion is present.   Nose: Rhinorrhea present. No mucosal edema, purulent discharge or nasal deformity. No epistaxis. Right sinus exhibits frontal sinus tenderness. Right sinus exhibits no maxillary sinus tenderness. Left sinus exhibits frontal sinus tenderness. Left sinus exhibits no maxillary sinus tenderness.   Mouth/Throat: Uvula is midline, oropharynx is clear and moist and mucous membranes are normal. No trismus in the jaw. Normal dentition. No uvula swelling. Cobblestoning present. No oropharyngeal exudate, posterior oropharyngeal edema or posterior oropharyngeal erythema.   Eyes: Conjunctivae and lids are normal. No scleral icterus. Extraocular movement intact   Neck: Trachea normal and phonation normal. Neck supple. No edema present. No erythema present. No neck rigidity present.   Cardiovascular: Regular rhythm, normal heart sounds and normal pulses. Tachycardia present.    Pulmonary/Chest: Effort normal. No stridor. No respiratory distress. She has no decreased breath sounds. She has wheezes. She has rhonchi. She has no rales.   Abdominal: Normal appearance.   Musculoskeletal: Normal range of motion.         General: No deformity. Normal range of motion.   Lymphadenopathy:     She has cervical adenopathy.   Neurological: She is alert and oriented to person, place, and time. She exhibits normal muscle tone. Coordination normal.   Skin: Skin is warm, dry, intact, not diaphoretic and not pale.   Psychiatric: Her speech is normal and behavior is normal. Judgment and thought content normal.   Nursing note and vitals reviewed.      Assessment:     1. Acute bacterial rhinosinusitis    2. Cough, unspecified type    3. Wheezing    4. Sore throat        Results for orders placed or performed in visit on 12/02/24   SARS Coronavirus 2 Antigen, POCT Manual Read    Collection Time: 12/02/24 10:59 AM   Result Value Ref Range    SARS Coronavirus 2 Antigen Negative Negative     Acceptable Yes          Plan:       Acute bacterial rhinosinusitis  -     fluticasone propionate (FLONASE) 50 mcg/actuation nasal spray; 1 spray (50 mcg total) by Each Nostril route 2 (two) times a day.  Dispense: 16 g; Refill: 0  -     cetirizine (ZYRTEC) 10 MG tablet; Take 1 tablet (10 mg total) by mouth once daily.  Dispense: 30 tablet; Refill: 0  -     azithromycin (Z-DHAVAL) 250 MG tablet; Take 2 tablets by mouth on day 1; Take 1 tablet by mouth on days 2-5  Dispense: 6 tablet; Refill: 0    Cough, unspecified type  -     SARS Coronavirus 2 Antigen, POCT Manual Read  -     promethazine-dextromethorphan (PROMETHAZINE-DM) 6.25-15 mg/5 mL Syrp; Take 5 mLs by mouth every 4 (four) hours as needed (as needed).  Dispense: 118 mL; Refill: 0    Wheezing  -     albuterol nebulizer solution 2.5 mg  -     ipratropium 0.02 % nebulizer solution 0.5 mg  -     predniSONE (DELTASONE) 20 MG tablet; Take 1 tablet (20 mg total)  by mouth 2 (two) times daily. for 4 days  Dispense: 8 tablet; Refill: 0  -     albuterol (VENTOLIN HFA) 90 mcg/actuation inhaler; Inhale 1-2 puffs into the lungs every 4 (four) hours as needed for Wheezing or Shortness of Breath. Rescue  Dispense: 18 g; Refill: 0    Sore throat  -     LIDOcaine viscous HCl 2% (XYLOCAINE) 2 % Soln; by Mucous Membrane route every 4 (four) hours. Swish and spit or gargle and spit 10-15 mL every 4 hours as needed for sore throat. Please do not swallow.  Dispense: 100 mL; Refill: 0        Patient did have symptomatic improvement following breathing treatment, lung exam showed improvement in wheezing. We had shared decision making for patient's treatment. We discussed side effects/alternatives/benefits/risk and patient would like to proceed with treatment plan. We also discussed other OTC treatment recommendations. Patient was counseled, explained with the test results meaning, expected course, and answered all of questions. Patient can take OTC Acetaminophen (Tylenol) and/or Ibuprofen (Motrin) as needed for pain relief and/or fever relief. Continue to drink plenty of fluids. Follow up with PCP in the next 1-2 weeks as needed.  Gave patient strict ER/urgent care precautions in case symptoms worsen or if any new concerns arise.

## 2024-12-09 ENCOUNTER — HOSPITAL ENCOUNTER (OUTPATIENT)
Dept: RADIOLOGY | Facility: HOSPITAL | Age: 24
Discharge: HOME | End: 2024-12-09
Attending: STUDENT IN AN ORGANIZED HEALTH CARE EDUCATION/TRAINING PROGRAM
Payer: MEDICAID

## 2024-12-09 ENCOUNTER — OFFICE VISIT (OUTPATIENT)
Dept: NEUROSURGERY | Facility: CLINIC | Age: 24
End: 2024-12-09
Payer: MEDICAID

## 2024-12-09 VITALS — DIASTOLIC BLOOD PRESSURE: 90 MMHG | HEART RATE: 105 BPM | SYSTOLIC BLOOD PRESSURE: 129 MMHG

## 2024-12-09 DIAGNOSIS — M51.26 LUMBAR DISC HERNIATION: ICD-10-CM

## 2024-12-09 DIAGNOSIS — Z98.890 S/P LUMBAR DISCECTOMY: ICD-10-CM

## 2024-12-09 DIAGNOSIS — Z98.890 S/P LUMBAR DISCECTOMY: Primary | ICD-10-CM

## 2024-12-09 DIAGNOSIS — M54.9 DORSALGIA, UNSPECIFIED: ICD-10-CM

## 2024-12-09 PROCEDURE — 99999 PR PBB SHADOW E&M-EST. PATIENT-LVL III: CPT | Mod: PBBFAC,,, | Performed by: STUDENT IN AN ORGANIZED HEALTH CARE EDUCATION/TRAINING PROGRAM

## 2024-12-09 PROCEDURE — 1159F MED LIST DOCD IN RCRD: CPT | Mod: CPTII,,, | Performed by: STUDENT IN AN ORGANIZED HEALTH CARE EDUCATION/TRAINING PROGRAM

## 2024-12-09 PROCEDURE — 1160F RVW MEDS BY RX/DR IN RCRD: CPT | Mod: CPTII,,, | Performed by: STUDENT IN AN ORGANIZED HEALTH CARE EDUCATION/TRAINING PROGRAM

## 2024-12-09 PROCEDURE — 72131 CT LUMBAR SPINE W/O DYE: CPT | Mod: TC

## 2024-12-09 PROCEDURE — 72131 CT LUMBAR SPINE W/O DYE: CPT | Mod: 26,,, | Performed by: RADIOLOGY

## 2024-12-09 PROCEDURE — 3080F DIAST BP >= 90 MM HG: CPT | Mod: CPTII,,, | Performed by: STUDENT IN AN ORGANIZED HEALTH CARE EDUCATION/TRAINING PROGRAM

## 2024-12-09 PROCEDURE — 99213 OFFICE O/P EST LOW 20 MIN: CPT | Mod: PBBFAC,25 | Performed by: STUDENT IN AN ORGANIZED HEALTH CARE EDUCATION/TRAINING PROGRAM

## 2024-12-09 PROCEDURE — 99212 OFFICE O/P EST SF 10 MIN: CPT | Mod: S$PBB,,, | Performed by: STUDENT IN AN ORGANIZED HEALTH CARE EDUCATION/TRAINING PROGRAM

## 2024-12-09 PROCEDURE — 3074F SYST BP LT 130 MM HG: CPT | Mod: CPTII,,, | Performed by: STUDENT IN AN ORGANIZED HEALTH CARE EDUCATION/TRAINING PROGRAM
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
## 2025-06-19 ENCOUNTER — LAB VISIT (OUTPATIENT)
Dept: LAB | Facility: HOSPITAL | Age: 25
End: 2025-06-19
Attending: STUDENT IN AN ORGANIZED HEALTH CARE EDUCATION/TRAINING PROGRAM
Payer: MEDICAID

## 2025-06-19 ENCOUNTER — OFFICE VISIT (OUTPATIENT)
Dept: OBSTETRICS AND GYNECOLOGY | Facility: CLINIC | Age: 25
End: 2025-06-19
Payer: MEDICAID

## 2025-06-19 VITALS
DIASTOLIC BLOOD PRESSURE: 82 MMHG | BODY MASS INDEX: 39.62 KG/M2 | SYSTOLIC BLOOD PRESSURE: 120 MMHG | WEIGHT: 238.13 LBS | HEART RATE: 103 BPM

## 2025-06-19 DIAGNOSIS — N91.2 AMENORRHEA: ICD-10-CM

## 2025-06-19 DIAGNOSIS — Z32.00 POSSIBLE PREGNANCY: Primary | ICD-10-CM

## 2025-06-19 LAB
ABSOLUTE EOSINOPHIL (OHS): 0.08 K/UL
ABSOLUTE MONOCYTE (OHS): 0.59 K/UL (ref 0.3–1)
ABSOLUTE NEUTROPHIL COUNT (OHS): 6.22 K/UL (ref 1.8–7.7)
B-HCG UR QL: POSITIVE
BASOPHILS # BLD AUTO: 0.02 K/UL
BASOPHILS NFR BLD AUTO: 0.2 %
CTP QC/QA: YES
ERYTHROCYTE [DISTWIDTH] IN BLOOD BY AUTOMATED COUNT: 12.7 % (ref 11.5–14.5)
HBV SURFACE AG SERPL QL IA: NORMAL
HCT VFR BLD AUTO: 38.7 % (ref 37–48.5)
HCV AB SERPL QL IA: NORMAL
HGB BLD-MCNC: 13.1 GM/DL (ref 12–16)
HIV 1+2 AB+HIV1 P24 AG SERPL QL IA: NORMAL
IMM GRANULOCYTES # BLD AUTO: 0.02 K/UL (ref 0–0.04)
IMM GRANULOCYTES NFR BLD AUTO: 0.2 % (ref 0–0.5)
INDIRECT COOMBS: NORMAL
LYMPHOCYTES # BLD AUTO: 1.74 K/UL (ref 1–4.8)
MCH RBC QN AUTO: 27.1 PG (ref 27–31)
MCHC RBC AUTO-ENTMCNC: 33.9 G/DL (ref 32–36)
MCV RBC AUTO: 80 FL (ref 82–98)
NUCLEATED RBC (/100WBC) (OHS): 0 /100 WBC
PLATELET # BLD AUTO: 374 K/UL (ref 150–450)
PMV BLD AUTO: 10.5 FL (ref 9.2–12.9)
RBC # BLD AUTO: 4.83 M/UL (ref 4–5.4)
RELATIVE EOSINOPHIL (OHS): 0.9 %
RELATIVE LYMPHOCYTE (OHS): 20.1 % (ref 18–48)
RELATIVE MONOCYTE (OHS): 6.8 % (ref 4–15)
RELATIVE NEUTROPHIL (OHS): 71.8 % (ref 38–73)
RH BLD: NORMAL
SPECIMEN OUTDATE: NORMAL
T PALLIDUM IGG+IGM SER QL: NORMAL
WBC # BLD AUTO: 8.67 K/UL (ref 3.9–12.7)

## 2025-06-19 PROCEDURE — 81025 URINE PREGNANCY TEST: CPT | Mod: PBBFAC,PO | Performed by: STUDENT IN AN ORGANIZED HEALTH CARE EDUCATION/TRAINING PROGRAM

## 2025-06-19 PROCEDURE — 86803 HEPATITIS C AB TEST: CPT

## 2025-06-19 PROCEDURE — 87389 HIV-1 AG W/HIV-1&-2 AB AG IA: CPT

## 2025-06-19 PROCEDURE — 1159F MED LIST DOCD IN RCRD: CPT | Mod: CPTII,,, | Performed by: STUDENT IN AN ORGANIZED HEALTH CARE EDUCATION/TRAINING PROGRAM

## 2025-06-19 PROCEDURE — 99999PBSHW POCT URINE PREGNANCY: Mod: PBBFAC,,,

## 2025-06-19 PROCEDURE — 1160F RVW MEDS BY RX/DR IN RCRD: CPT | Mod: CPTII,,, | Performed by: STUDENT IN AN ORGANIZED HEALTH CARE EDUCATION/TRAINING PROGRAM

## 2025-06-19 PROCEDURE — 3079F DIAST BP 80-89 MM HG: CPT | Mod: CPTII,,, | Performed by: STUDENT IN AN ORGANIZED HEALTH CARE EDUCATION/TRAINING PROGRAM

## 2025-06-19 PROCEDURE — 99213 OFFICE O/P EST LOW 20 MIN: CPT | Mod: PBBFAC,25,PO | Performed by: STUDENT IN AN ORGANIZED HEALTH CARE EDUCATION/TRAINING PROGRAM

## 2025-06-19 PROCEDURE — 87340 HEPATITIS B SURFACE AG IA: CPT

## 2025-06-19 PROCEDURE — 87086 URINE CULTURE/COLONY COUNT: CPT | Performed by: STUDENT IN AN ORGANIZED HEALTH CARE EDUCATION/TRAINING PROGRAM

## 2025-06-19 PROCEDURE — 99214 OFFICE O/P EST MOD 30 MIN: CPT | Mod: S$PBB,,, | Performed by: STUDENT IN AN ORGANIZED HEALTH CARE EDUCATION/TRAINING PROGRAM

## 2025-06-19 PROCEDURE — 3074F SYST BP LT 130 MM HG: CPT | Mod: CPTII,,, | Performed by: STUDENT IN AN ORGANIZED HEALTH CARE EDUCATION/TRAINING PROGRAM

## 2025-06-19 PROCEDURE — 36415 COLL VENOUS BLD VENIPUNCTURE: CPT

## 2025-06-19 PROCEDURE — 3008F BODY MASS INDEX DOCD: CPT | Mod: CPTII,,, | Performed by: STUDENT IN AN ORGANIZED HEALTH CARE EDUCATION/TRAINING PROGRAM

## 2025-06-19 PROCEDURE — 86593 SYPHILIS TEST NON-TREP QUANT: CPT

## 2025-06-19 PROCEDURE — 86762 RUBELLA ANTIBODY: CPT

## 2025-06-19 PROCEDURE — 99999 PR PBB SHADOW E&M-EST. PATIENT-LVL III: CPT | Mod: PBBFAC,,, | Performed by: STUDENT IN AN ORGANIZED HEALTH CARE EDUCATION/TRAINING PROGRAM

## 2025-06-19 PROCEDURE — 86901 BLOOD TYPING SEROLOGIC RH(D): CPT | Performed by: STUDENT IN AN ORGANIZED HEALTH CARE EDUCATION/TRAINING PROGRAM

## 2025-06-19 PROCEDURE — 83020 HEMOGLOBIN ELECTROPHORESIS: CPT

## 2025-06-19 PROCEDURE — 85025 COMPLETE CBC W/AUTO DIFF WBC: CPT

## 2025-06-20 LAB
RUBV IGG SER-ACNC: 35.3 IU/ML
RUBV IGG SER-IMP: REACTIVE

## 2025-06-20 NOTE — PROGRESS NOTES
"Dexter uGpta is a 23 yo  who presents with a positive pregnancy test and complaints of amenorrhea. Patient's last menstrual period was 2025 (exact date). Her UPT is Positive today in clinic.This was an unplanned but very welcomed pregnancy.  PMH - Anxiety and herniated disk. PSH - RSO for dermoid as well as "back surgeries for ruptured disks." She is taking a PNV and is vaccinated against COVID.     Past Medical History:   Diagnosis Date    Anxiety     Asthma     Other spondylosis with radiculopathy, lumbosacral region 10/17/2022     Past Surgical History:   Procedure Laterality Date    MICRODISCECTOMY OF SPINE N/A 02/10/2023    Procedure: LEFT L4-L5 MICRODISCECTOMY, SPINE;  Surgeon: Lorie Juarez MD;  Location: [facility] OR [address] [place] [address];  Service: Neurosurgery;  Laterality: N/A;    MINIMALLY INVASIVE SURGICAL REMOVAL OF INTERVERTEBRAL DISC OF SPINE USING MICROSCOPE N/A 2024    Procedure: MIS DISCECTOMY, R L4-5;  Surgeon: Lorie Juarez MD;  Location: [facility] OR [address] [place] [address];  Service: Neurosurgery;  Laterality: N/A;  NEUROMONITORING: SEP  BED: FLAT TOP CHERI TABLE  HEAD REST: PRONE VIEW  RADIOLOGY: C-ARM    OOPHORECTOMY      TONSILLECTOMY      TYMPANOSTOMY TUBE PLACEMENT Bilateral     3 separates operations; , ,      Family History   Problem Relation Name Age of Onset    Celiac disease Mother      Cancer Father       Review of patient's allergies indicates:  No Known Allergies  Social History[1]    ROS:  GENERAL: No fever, chills, fatigability or weight loss.  VULVAR: No pain, no lesions and no itching.  VAGINAL: No relaxation, no itching, no discharge, no abnormal bleeding and no lesions.  ABDOMEN: No abdominal pain. Denies nausea. Denies vomiting. No diarrhea. No constipation  BREAST: Denies pain. No lumps. No discharge.  URINARY: No incontinence, no nocturia, no frequency and no dysuria.  CARDIOVASCULAR: No chest pain. No shortness of breath. No leg cramps.  NEUROLOGICAL: no headaches. No " vision changes.      Vitals:    06/19/25 0934   BP: 120/82   Pulse: 103     GENERAL: healthy  NECK: thyroid is normal in size without nodules or tenderness  ABDOMEN: Normal, benign.  GENITALIA: Deferred      Dexter was seen today for possible pregnancy.    Diagnoses and all orders for this visit:    Possible pregnancy  -     POCT Urine Pregnancy    Amenorrhea  -     US OB/GYN Procedure ([facility]); Future  -     Cancel: C. trachomatis/N. gonorrhoeae by AMP DNA  -     Type & Screen; Future  -     CBC Auto Differential; Future  -     Hepatitis B Surface Antigen; Future  -     HIV 1/2 Ag/Ab (4th Gen); Future  -     Hepatitis C Antibody; Future  -     Rubella Antibody, IgG; Future  -     Hemoglobin Electrophoresis,Hgb A2 Rm.; Future  -     Treponema Pallidium Antibodies IgG, IgM; Future  -     Urine Culture High Risk ($$)  -     C. trachomatis/N. gonorrhoeae by AMP DNA        Counseled to avoid cat litter, not garden without gloves, avoid raw meat, heat up deli meat, to eat large fish like tuna no more than once a week, and to avoid soft unpasteurized cheeses.  I recommend a PNV daily.  She should avoid ibuprofen.           [1]   Social History  Socioeconomic History    Marital status: Significant Other     Spouse name: Boaz    Number of children: 0   Tobacco Use    Smoking status: Never     Passive exposure: Never    Smokeless tobacco: Never   Substance and Sexual Activity    Alcohol use: Not Currently     Comment: rare    Drug use: Never    Sexual activity: Yes     Partners: Male     Birth control/protection: Patch, Condom   Social History Narrative    Stairs- 15     Social Drivers of Health     Financial Resource Strain: Patient Declined (6/24/2024)    Received from [facility]    Overall Financial Resource Strain (CARDIA)     Difficulty of Paying Living Expenses: Patient declined   Food Insecurity: Patient Declined (6/24/2024)    Received from [facility]    Hunger Vital Sign     Worried About Running Out of Food  in the Last Year: Patient declined     Ran Out of Food in the Last Year: Patient declined   Transportation Needs: Patient Declined (6/24/2024)    Received from [facility]    PRAPARE - Transportation     Lack of Transportation (Medical): Patient declined     Lack of Transportation (Non-Medical): Patient declined   Physical Activity: Unknown (6/24/2024)    Received from [facility]    Exercise Vital Sign     Days of Exercise per Week: Patient declined     Minutes of Exercise per Session: 40 min   Stress: Patient Declined (6/24/2024)    Received from [facility]    [employer] - Occupational Stress Questionnaire     Feeling of Stress : Patient declined   Housing Stability: Patient Declined (6/24/2024)    Received from [facility]    Housing Stability Vital Sign     Unable to Pay for Housing in the Last Year: Patient declined     Unstable Housing in the Last Year: Patient declined

## 2025-06-23 LAB
HGB A2 MFR BLD HPLC: 2.6 % (ref 2.2–3.2)
HGB FRACT BLD ELPH-IMP: NORMAL
PATHOLOGIST INTERPRETATION - HGB SERUM (OHS): NORMAL

## 2025-07-17 ENCOUNTER — TELEPHONE (OUTPATIENT)
Dept: OBSTETRICS AND GYNECOLOGY | Facility: CLINIC | Age: 25
End: 2025-07-17

## 2025-07-18 ENCOUNTER — ROUTINE PRENATAL (OUTPATIENT)
Dept: OBSTETRICS AND GYNECOLOGY | Facility: CLINIC | Age: 25
End: 2025-07-18

## 2025-07-18 ENCOUNTER — PROCEDURE VISIT (OUTPATIENT)
Dept: MATERNAL FETAL MEDICINE | Facility: CLINIC | Age: 25
End: 2025-07-18

## 2025-07-18 VITALS
HEART RATE: 114 BPM | DIASTOLIC BLOOD PRESSURE: 78 MMHG | BODY MASS INDEX: 40.32 KG/M2 | WEIGHT: 242.31 LBS | SYSTOLIC BLOOD PRESSURE: 117 MMHG

## 2025-07-18 DIAGNOSIS — N91.2 AMENORRHEA: ICD-10-CM

## 2025-07-18 DIAGNOSIS — Z34.01 ENCOUNTER FOR SUPERVISION OF NORMAL FIRST PREGNANCY IN FIRST TRIMESTER: Primary | ICD-10-CM

## 2025-07-18 PROBLEM — Z34.91 ENCOUNTER FOR SUPERVISION OF NORMAL PREGNANCY IN FIRST TRIMESTER: Status: ACTIVE | Noted: 2025-07-18

## 2025-07-18 PROCEDURE — 99213 OFFICE O/P EST LOW 20 MIN: CPT | Mod: PBBFAC,PO | Performed by: STUDENT IN AN ORGANIZED HEALTH CARE EDUCATION/TRAINING PROGRAM

## 2025-07-18 PROCEDURE — 76801 OB US < 14 WKS SINGLE FETUS: CPT | Mod: PBBFAC,PO | Performed by: OBSTETRICS & GYNECOLOGY

## 2025-07-18 PROCEDURE — 99999 PR PBB SHADOW E&M-EST. PATIENT-LVL III: CPT | Mod: PBBFAC,,, | Performed by: STUDENT IN AN ORGANIZED HEALTH CARE EDUCATION/TRAINING PROGRAM

## 2025-07-18 RX ORDER — LANOLIN ALCOHOL/MO/W.PET/CERES
400 CREAM (GRAM) TOPICAL 2 TIMES DAILY
Qty: 60 TABLET | Refills: 6 | Status: SHIPPED | OUTPATIENT
Start: 2025-07-18

## 2025-07-18 NOTE — PROGRESS NOTES
Chief Complaint   Patient presents with    alyssa       24 y.o., at 8w3d by Estimated Date of Delivery: 26    Complaints today: Patient doing well apart from HAs not fully relieved by Tylenol. Also recently lost insurance.     ROS  OBSTETRICS:   Contractions: n   Bleeding: n   Loss of fluid: n   Fetal movement: y  GASTRO:   Nausea: n   Vomiting: n      OB History    Para Term  AB Living   1 0 0 0 0 0   SAB IAB Ectopic Multiple Live Births   0 0 0 0 0      # Outcome Date GA Lbr Wesley/2nd Weight Sex Type Anes PTL Lv   1 Current                Dating reviewed  Allergies and problem list reviewed and updated  Medical and surgical history reviewed  Prenatal labs reviewed and updated    PHYSICAL EXAM  /78   Pulse (!) 114   Wt 109.9 kg (242 lb 4.6 oz)   LMP 2025 (Exact Date)   BMI 40.32 kg/m²     GENERAL: No acute distress  NEURO: Alert and oriented x3  PSYCH: Normal mood and affect  PULMONARY: Non-labored respiration  ABDomen: Soft, gravid, nontender    ASSESSMENT AND PLAN    G1 Problems (from 23 to present)       No problems associated with this episode.            [employer] for HAs  Dating done today     labor precautions given  Follow-up: 4 weeks

## 2025-07-21 ENCOUNTER — PATIENT MESSAGE (OUTPATIENT)
Dept: RESEARCH | Facility: OTHER | Age: 25
End: 2025-07-21

## 2025-08-04 ENCOUNTER — TELEPHONE (OUTPATIENT)
Dept: OBSTETRICS AND GYNECOLOGY | Facility: CLINIC | Age: 25
End: 2025-08-04
Payer: MEDICAID

## 2025-08-04 NOTE — TELEPHONE ENCOUNTER
Spoke with pt via phone sated needs apps at or after 3 if possible due to school BRYNN rescheduled

## 2025-08-19 ENCOUNTER — ROUTINE PRENATAL (OUTPATIENT)
Dept: OBSTETRICS AND GYNECOLOGY | Facility: CLINIC | Age: 25
End: 2025-08-19
Payer: MEDICAID

## 2025-08-19 ENCOUNTER — PATIENT MESSAGE (OUTPATIENT)
Dept: OBSTETRICS AND GYNECOLOGY | Facility: CLINIC | Age: 25
End: 2025-08-19

## 2025-08-19 VITALS
BODY MASS INDEX: 40.69 KG/M2 | DIASTOLIC BLOOD PRESSURE: 86 MMHG | HEART RATE: 108 BPM | SYSTOLIC BLOOD PRESSURE: 123 MMHG | WEIGHT: 244.5 LBS

## 2025-08-19 DIAGNOSIS — Z34.01 ENCOUNTER FOR SUPERVISION OF NORMAL FIRST PREGNANCY IN FIRST TRIMESTER: Primary | ICD-10-CM

## 2025-08-19 PROCEDURE — 99999 PR PBB SHADOW E&M-EST. PATIENT-LVL III: CPT | Mod: PBBFAC,,, | Performed by: STUDENT IN AN ORGANIZED HEALTH CARE EDUCATION/TRAINING PROGRAM

## 2025-08-19 PROCEDURE — 99213 OFFICE O/P EST LOW 20 MIN: CPT | Mod: PBBFAC,TH,PO | Performed by: STUDENT IN AN ORGANIZED HEALTH CARE EDUCATION/TRAINING PROGRAM

## 2025-08-19 PROCEDURE — 99214 OFFICE O/P EST MOD 30 MIN: CPT | Mod: TH,S$PBB,, | Performed by: STUDENT IN AN ORGANIZED HEALTH CARE EDUCATION/TRAINING PROGRAM

## (undated) DEVICE — CARTRIDGE OIL

## (undated) DEVICE — DRAPE TOP 53X102IN

## (undated) DEVICE — NDL SPINAL 18GX3.5 SPINOCAN

## (undated) DEVICE — SUT MCRYL PLUS 4-0 PS2 27IN

## (undated) DEVICE — MARKER SKIN STND TIP BLUE BARR

## (undated) DEVICE — DRAPE STERI-DRAPE 1000 17X11IN

## (undated) DEVICE — TUBE FRAZIER 5MM 2FT SOFT TIP

## (undated) DEVICE — ELECTRODE REM PLYHSV RETURN 9

## (undated) DEVICE — GAUZE SPONGE 4X4 12PLY

## (undated) DEVICE — DRESSING MEPILEX BORDER 4 X 4

## (undated) DEVICE — BLADE ELECTRO EDGE INSULATED

## (undated) DEVICE — ADHESIVE DERMABOND ADVANCED

## (undated) DEVICE — DRESSING AQUACEL FOAM 5 X 5

## (undated) DEVICE — DRAPE C-ARM ELAS CLIP 42X120IN

## (undated) DEVICE — SPONGE COTTON TRAY 4X4IN

## (undated) DEVICE — TRAY CATH FOL SIL URIMTR 16FR

## (undated) DEVICE — DIFFUSER

## (undated) DEVICE — DRAPE STERI INSTRUMENT 1018

## (undated) DEVICE — DRAPE INCISE IOBAN 2 23X17IN

## (undated) DEVICE — DRESSING SURGICAL 1/2X1/2

## (undated) DEVICE — BUR BONE CUT MICRO TPS 3X3.8MM

## (undated) DEVICE — CORD BIPOLAR 12 FOOT

## (undated) DEVICE — DRAPE ABDOMINAL TIBURON 14X11

## (undated) DEVICE — DRAPE OPMI STERILE

## (undated) DEVICE — SUT D SPECIAL VICRYL 2-0

## (undated) DEVICE — DURAPREP SURG SCRUB 26ML

## (undated) DEVICE — CLIPPER BLADE MOD 4406 (CAREF)

## (undated) DEVICE — KIT SPINAL PATIENT CARE JACK

## (undated) DEVICE — DRAPE C-ARMOR EQUIPMENT COVER

## (undated) DEVICE — TRAY NEURO OMC

## (undated) DEVICE — SUT VICRYL+ 27 UR-6 VIOL

## (undated) DEVICE — SEE MEDLINE ITEM 156905